# Patient Record
Sex: FEMALE | Race: WHITE | NOT HISPANIC OR LATINO | Employment: OTHER | URBAN - METROPOLITAN AREA
[De-identification: names, ages, dates, MRNs, and addresses within clinical notes are randomized per-mention and may not be internally consistent; named-entity substitution may affect disease eponyms.]

---

## 2017-04-25 ENCOUNTER — TRANSCRIBE ORDERS (OUTPATIENT)
Dept: ADMINISTRATIVE | Facility: HOSPITAL | Age: 77
End: 2017-04-25

## 2017-04-25 DIAGNOSIS — Z13.820 SCREENING FOR OSTEOPOROSIS: Primary | ICD-10-CM

## 2017-05-18 ENCOUNTER — HOSPITAL ENCOUNTER (OUTPATIENT)
Dept: RADIOLOGY | Facility: MEDICAL CENTER | Age: 77
Discharge: HOME | End: 2017-05-18
Payer: MEDICARE

## 2017-05-18 DIAGNOSIS — Z13.820 SCREENING FOR OSTEOPOROSIS: ICD-10-CM

## 2017-05-18 PROCEDURE — 77080 DXA BONE DENSITY AXIAL: CPT

## 2017-11-15 ENCOUNTER — APPOINTMENT (OUTPATIENT)
Dept: LAB | Facility: MEDICAL CENTER | Age: 77
End: 2017-11-15
Payer: MEDICARE

## 2017-11-15 ENCOUNTER — TRANSCRIBE ORDERS (OUTPATIENT)
Dept: LAB | Facility: MEDICAL CENTER | Age: 77
End: 2017-11-15

## 2017-11-15 DIAGNOSIS — I10 ESSENTIAL HYPERTENSION, MALIGNANT: Primary | ICD-10-CM

## 2017-11-15 DIAGNOSIS — M85.9 DISORDER OF BONE DENSITY AND STRUCTURE, UNSPECIFIED: ICD-10-CM

## 2017-11-15 DIAGNOSIS — I10 ESSENTIAL HYPERTENSION, MALIGNANT: ICD-10-CM

## 2017-11-15 LAB
ALBUMIN SERPL BCP-MCNC: 3.3 G/DL (ref 3.5–5)
ALP SERPL-CCNC: 173 U/L (ref 46–116)
ALT SERPL W P-5'-P-CCNC: 21 U/L (ref 12–78)
ANION GAP SERPL CALCULATED.3IONS-SCNC: 7 MMOL/L (ref 4–13)
AST SERPL W P-5'-P-CCNC: 30 U/L (ref 5–45)
BASOPHILS # BLD AUTO: 0.04 THOUSANDS/ΜL (ref 0–0.1)
BASOPHILS NFR BLD AUTO: 1 % (ref 0–1)
BILIRUB SERPL-MCNC: 0.3 MG/DL (ref 0.2–1)
BUN SERPL-MCNC: 7 MG/DL (ref 5–25)
CALCIUM SERPL-MCNC: 8.9 MG/DL (ref 8.3–10.1)
CHLORIDE SERPL-SCNC: 99 MMOL/L (ref 100–108)
CHOLEST SERPL-MCNC: 150 MG/DL (ref 50–200)
CO2 SERPL-SCNC: 29 MMOL/L (ref 21–32)
CREAT SERPL-MCNC: 0.59 MG/DL (ref 0.6–1.3)
EOSINOPHIL # BLD AUTO: 0.14 THOUSAND/ΜL (ref 0–0.61)
EOSINOPHIL NFR BLD AUTO: 3 % (ref 0–6)
ERYTHROCYTE [DISTWIDTH] IN BLOOD BY AUTOMATED COUNT: 12.7 % (ref 11.6–15.1)
GFR SERPL CREATININE-BSD FRML MDRD: 87 ML/MIN/1.73SQ M
GLUCOSE P FAST SERPL-MCNC: 70 MG/DL (ref 65–99)
HCT VFR BLD AUTO: 39 % (ref 34.8–46.1)
HDLC SERPL-MCNC: 79 MG/DL (ref 40–60)
HGB BLD-MCNC: 13.7 G/DL (ref 11.5–15.4)
LDLC SERPL CALC-MCNC: 48 MG/DL (ref 0–100)
LYMPHOCYTES # BLD AUTO: 1.35 THOUSANDS/ΜL (ref 0.6–4.47)
LYMPHOCYTES NFR BLD AUTO: 25 % (ref 14–44)
MCH RBC QN AUTO: 34.2 PG (ref 26.8–34.3)
MCHC RBC AUTO-ENTMCNC: 35.1 G/DL (ref 31.4–37.4)
MCV RBC AUTO: 97 FL (ref 82–98)
MONOCYTES # BLD AUTO: 0.69 THOUSAND/ΜL (ref 0.17–1.22)
MONOCYTES NFR BLD AUTO: 13 % (ref 4–12)
NEUTROPHILS # BLD AUTO: 3.28 THOUSANDS/ΜL (ref 1.85–7.62)
NEUTS SEG NFR BLD AUTO: 58 % (ref 43–75)
NRBC BLD AUTO-RTO: 0 /100 WBCS
PLATELET # BLD AUTO: 258 THOUSANDS/UL (ref 149–390)
PMV BLD AUTO: 10 FL (ref 8.9–12.7)
POTASSIUM SERPL-SCNC: 3.6 MMOL/L (ref 3.5–5.3)
PROT SERPL-MCNC: 6.9 G/DL (ref 6.4–8.2)
RBC # BLD AUTO: 4.01 MILLION/UL (ref 3.81–5.12)
SODIUM SERPL-SCNC: 135 MMOL/L (ref 136–145)
TRIGL SERPL-MCNC: 114 MG/DL
WBC # BLD AUTO: 5.52 THOUSAND/UL (ref 4.31–10.16)

## 2017-11-15 PROCEDURE — 82652 VIT D 1 25-DIHYDROXY: CPT

## 2017-11-15 PROCEDURE — 85025 COMPLETE CBC W/AUTO DIFF WBC: CPT

## 2017-11-15 PROCEDURE — 80053 COMPREHEN METABOLIC PANEL: CPT

## 2017-11-15 PROCEDURE — 36415 COLL VENOUS BLD VENIPUNCTURE: CPT

## 2017-11-15 PROCEDURE — 80061 LIPID PANEL: CPT

## 2017-11-16 LAB — 1,25(OH)2D3 SERPL-MCNC: 69.7 PG/ML (ref 19.9–79.3)

## 2018-06-14 ENCOUNTER — APPOINTMENT (OUTPATIENT)
Dept: LAB | Facility: MEDICAL CENTER | Age: 78
End: 2018-06-14
Payer: MEDICARE

## 2018-06-14 ENCOUNTER — TRANSCRIBE ORDERS (OUTPATIENT)
Dept: ADMINISTRATIVE | Facility: HOSPITAL | Age: 78
End: 2018-06-14

## 2018-06-14 DIAGNOSIS — R41.3 MEMORY LOSS: Primary | ICD-10-CM

## 2018-06-14 PROCEDURE — 36415 COLL VENOUS BLD VENIPUNCTURE: CPT | Performed by: PSYCHIATRY & NEUROLOGY

## 2018-06-14 PROCEDURE — 84443 ASSAY THYROID STIM HORMONE: CPT | Performed by: PSYCHIATRY & NEUROLOGY

## 2018-06-14 PROCEDURE — 82607 VITAMIN B-12: CPT | Performed by: PSYCHIATRY & NEUROLOGY

## 2018-06-15 LAB
TSH SERPL DL<=0.05 MIU/L-ACNC: 1.97 UIU/ML (ref 0.36–3.74)
VIT B12 SERPL-MCNC: 410 PG/ML (ref 100–900)

## 2019-07-15 ENCOUNTER — APPOINTMENT (OUTPATIENT)
Dept: LAB | Facility: MEDICAL CENTER | Age: 79
End: 2019-07-15
Payer: MEDICARE

## 2019-07-15 ENCOUNTER — TRANSCRIBE ORDERS (OUTPATIENT)
Dept: LAB | Facility: MEDICAL CENTER | Age: 79
End: 2019-07-15

## 2019-07-15 DIAGNOSIS — I10 ESSENTIAL HYPERTENSION, BENIGN: Primary | ICD-10-CM

## 2019-07-15 DIAGNOSIS — G40.109 SPECIAL SENSORY ATTACKS (HCC): ICD-10-CM

## 2019-07-15 DIAGNOSIS — E78.00 PURE HYPERCHOLESTEROLEMIA: ICD-10-CM

## 2019-07-15 DIAGNOSIS — M85.9 DISORDER OF BONE DENSITY AND STRUCTURE, UNSPECIFIED: ICD-10-CM

## 2019-07-15 DIAGNOSIS — K21.00 REFLUX ESOPHAGITIS: ICD-10-CM

## 2019-07-15 DIAGNOSIS — I10 ESSENTIAL HYPERTENSION, BENIGN: ICD-10-CM

## 2019-07-15 LAB
25(OH)D3 SERPL-MCNC: 30 NG/ML (ref 30–100)
ALBUMIN SERPL BCP-MCNC: 3.4 G/DL (ref 3.5–5)
ALP SERPL-CCNC: 99 U/L (ref 46–116)
ALT SERPL W P-5'-P-CCNC: 17 U/L (ref 12–78)
ANION GAP SERPL CALCULATED.3IONS-SCNC: 5 MMOL/L (ref 4–13)
AST SERPL W P-5'-P-CCNC: 21 U/L (ref 5–45)
BASOPHILS # BLD AUTO: 0.05 THOUSANDS/ΜL (ref 0–0.1)
BASOPHILS NFR BLD AUTO: 1 % (ref 0–1)
BILIRUB SERPL-MCNC: 0.61 MG/DL (ref 0.2–1)
BUN SERPL-MCNC: 14 MG/DL (ref 5–25)
CALCIUM SERPL-MCNC: 8.6 MG/DL (ref 8.3–10.1)
CHLORIDE SERPL-SCNC: 105 MMOL/L (ref 100–108)
CHOLEST SERPL-MCNC: 172 MG/DL (ref 50–200)
CO2 SERPL-SCNC: 27 MMOL/L (ref 21–32)
CREAT SERPL-MCNC: 0.68 MG/DL (ref 0.6–1.3)
EOSINOPHIL # BLD AUTO: 0.13 THOUSAND/ΜL (ref 0–0.61)
EOSINOPHIL NFR BLD AUTO: 2 % (ref 0–6)
ERYTHROCYTE [DISTWIDTH] IN BLOOD BY AUTOMATED COUNT: 14.2 % (ref 11.6–15.1)
GFR SERPL CREATININE-BSD FRML MDRD: 82 ML/MIN/1.73SQ M
GLUCOSE P FAST SERPL-MCNC: 83 MG/DL (ref 65–99)
HCT VFR BLD AUTO: 42.1 % (ref 34.8–46.1)
HDLC SERPL-MCNC: 54 MG/DL (ref 40–60)
HGB BLD-MCNC: 13.9 G/DL (ref 11.5–15.4)
IMM GRANULOCYTES # BLD AUTO: 0.02 THOUSAND/UL (ref 0–0.2)
IMM GRANULOCYTES NFR BLD AUTO: 0 % (ref 0–2)
LDLC SERPL CALC-MCNC: 85 MG/DL (ref 0–100)
LYMPHOCYTES # BLD AUTO: 1.27 THOUSANDS/ΜL (ref 0.6–4.47)
LYMPHOCYTES NFR BLD AUTO: 21 % (ref 14–44)
MCH RBC QN AUTO: 32.2 PG (ref 26.8–34.3)
MCHC RBC AUTO-ENTMCNC: 33 G/DL (ref 31.4–37.4)
MCV RBC AUTO: 98 FL (ref 82–98)
MONOCYTES # BLD AUTO: 0.73 THOUSAND/ΜL (ref 0.17–1.22)
MONOCYTES NFR BLD AUTO: 12 % (ref 4–12)
NEUTROPHILS # BLD AUTO: 4.01 THOUSANDS/ΜL (ref 1.85–7.62)
NEUTS SEG NFR BLD AUTO: 64 % (ref 43–75)
NONHDLC SERPL-MCNC: 118 MG/DL
NRBC BLD AUTO-RTO: 0 /100 WBCS
PLATELET # BLD AUTO: 267 THOUSANDS/UL (ref 149–390)
PMV BLD AUTO: 10.8 FL (ref 8.9–12.7)
POTASSIUM SERPL-SCNC: 3.6 MMOL/L (ref 3.5–5.3)
PROT SERPL-MCNC: 7.1 G/DL (ref 6.4–8.2)
RBC # BLD AUTO: 4.32 MILLION/UL (ref 3.81–5.12)
SODIUM SERPL-SCNC: 137 MMOL/L (ref 136–145)
TRIGL SERPL-MCNC: 167 MG/DL
TSH SERPL DL<=0.05 MIU/L-ACNC: 2.47 UIU/ML (ref 0.36–3.74)
WBC # BLD AUTO: 6.21 THOUSAND/UL (ref 4.31–10.16)

## 2019-07-15 PROCEDURE — 84443 ASSAY THYROID STIM HORMONE: CPT

## 2019-07-15 PROCEDURE — 85025 COMPLETE CBC W/AUTO DIFF WBC: CPT

## 2019-07-15 PROCEDURE — 82306 VITAMIN D 25 HYDROXY: CPT

## 2019-07-15 PROCEDURE — 80053 COMPREHEN METABOLIC PANEL: CPT

## 2019-07-15 PROCEDURE — 80061 LIPID PANEL: CPT

## 2019-07-15 PROCEDURE — 36415 COLL VENOUS BLD VENIPUNCTURE: CPT

## 2020-05-30 ENCOUNTER — APPOINTMENT (EMERGENCY)
Dept: RADIOLOGY | Facility: HOSPITAL | Age: 80
End: 2020-05-30
Payer: MEDICARE

## 2020-05-30 ENCOUNTER — HOSPITAL ENCOUNTER (EMERGENCY)
Facility: HOSPITAL | Age: 80
Discharge: HOME | End: 2020-05-30
Attending: EMERGENCY MEDICINE
Payer: MEDICARE

## 2020-05-30 VITALS
TEMPERATURE: 98.4 F | HEART RATE: 86 BPM | OXYGEN SATURATION: 96 % | BODY MASS INDEX: 18.5 KG/M2 | WEIGHT: 114.64 LBS | DIASTOLIC BLOOD PRESSURE: 90 MMHG | SYSTOLIC BLOOD PRESSURE: 167 MMHG | RESPIRATION RATE: 18 BRPM

## 2020-05-30 DIAGNOSIS — M54.41 ACUTE RIGHT-SIDED LOW BACK PAIN WITH RIGHT-SIDED SCIATICA: ICD-10-CM

## 2020-05-30 DIAGNOSIS — M54.50 ACUTE MIDLINE LOW BACK PAIN WITHOUT SCIATICA: Primary | ICD-10-CM

## 2020-05-30 DIAGNOSIS — R50.9 FEVER: ICD-10-CM

## 2020-05-30 LAB
BACTERIA UR QL AUTO: ABNORMAL /HPF
BILIRUB UR QL STRIP: ABNORMAL
CLARITY UR: CLEAR
COLOR UR: YELLOW
GLUCOSE UR STRIP-MCNC: NEGATIVE MG/DL
HGB UR QL STRIP.AUTO: ABNORMAL
KETONES UR STRIP-MCNC: ABNORMAL MG/DL
LEUKOCYTE ESTERASE UR QL STRIP: ABNORMAL
MUCOUS THREADS UR QL AUTO: ABNORMAL
NITRITE UR QL STRIP: NEGATIVE
NON-SQ EPI CELLS URNS QL MICRO: ABNORMAL /HPF
PH UR STRIP.AUTO: 6 [PH]
PROT UR STRIP-MCNC: NEGATIVE MG/DL
RBC #/AREA URNS AUTO: ABNORMAL /HPF
SARS-COV-2 RNA RESP QL NAA+PROBE: NEGATIVE
SP GR UR STRIP.AUTO: 1.02 (ref 1–1.03)
UROBILINOGEN UR QL STRIP.AUTO: 1 E.U./DL
WBC #/AREA URNS AUTO: ABNORMAL /HPF

## 2020-05-30 PROCEDURE — 72170 X-RAY EXAM OF PELVIS: CPT

## 2020-05-30 PROCEDURE — 81001 URINALYSIS AUTO W/SCOPE: CPT | Performed by: EMERGENCY MEDICINE

## 2020-05-30 PROCEDURE — 71045 X-RAY EXAM CHEST 1 VIEW: CPT

## 2020-05-30 PROCEDURE — 87635 SARS-COV-2 COVID-19 AMP PRB: CPT | Performed by: EMERGENCY MEDICINE

## 2020-05-30 PROCEDURE — 72100 X-RAY EXAM L-S SPINE 2/3 VWS: CPT

## 2020-05-30 PROCEDURE — 99284 EMERGENCY DEPT VISIT MOD MDM: CPT | Performed by: EMERGENCY MEDICINE

## 2020-05-30 PROCEDURE — 99284 EMERGENCY DEPT VISIT MOD MDM: CPT

## 2020-05-30 PROCEDURE — 72220 X-RAY EXAM SACRUM TAILBONE: CPT

## 2020-05-30 PROCEDURE — 87086 URINE CULTURE/COLONY COUNT: CPT | Performed by: EMERGENCY MEDICINE

## 2020-05-30 RX ORDER — LIDOCAINE 50 MG/G
1 PATCH TOPICAL ONCE
Status: DISCONTINUED | OUTPATIENT
Start: 2020-05-30 | End: 2020-05-30 | Stop reason: HOSPADM

## 2020-05-30 RX ORDER — OMEPRAZOLE 10 MG/1
10 CAPSULE, DELAYED RELEASE ORAL DAILY
COMMUNITY

## 2020-05-30 RX ORDER — DONEPEZIL HYDROCHLORIDE 10 MG/1
10 TABLET, FILM COATED ORAL
Status: ON HOLD | COMMUNITY
End: 2021-01-29 | Stop reason: SDUPTHER

## 2020-05-30 RX ORDER — MULTIVITAMIN
1 TABLET ORAL DAILY
COMMUNITY

## 2020-05-30 RX ORDER — ACETAMINOPHEN 325 MG/1
650 TABLET ORAL ONCE
Status: COMPLETED | OUTPATIENT
Start: 2020-05-30 | End: 2020-05-30

## 2020-05-30 RX ADMIN — LIDOCAINE 1 PATCH: 50 PATCH TOPICAL at 09:49

## 2020-05-30 RX ADMIN — ACETAMINOPHEN 650 MG: 325 TABLET, FILM COATED ORAL at 09:48

## 2020-05-31 LAB — BACTERIA UR CULT: NORMAL

## 2020-07-15 ENCOUNTER — TRANSCRIBE ORDERS (OUTPATIENT)
Dept: ADMINISTRATIVE | Facility: HOSPITAL | Age: 80
End: 2020-07-15

## 2020-07-15 DIAGNOSIS — M89.9 BONE DISORDER: Primary | ICD-10-CM

## 2020-08-03 ENCOUNTER — TRANSCRIBE ORDERS (OUTPATIENT)
Dept: ADMINISTRATIVE | Facility: HOSPITAL | Age: 80
End: 2020-08-03

## 2020-08-03 ENCOUNTER — APPOINTMENT (OUTPATIENT)
Dept: LAB | Facility: MEDICAL CENTER | Age: 80
End: 2020-08-03
Payer: MEDICARE

## 2020-08-03 DIAGNOSIS — M85.9 DISORDER OF BONE DENSITY AND STRUCTURE, UNSPECIFIED: ICD-10-CM

## 2020-08-03 DIAGNOSIS — I10 ESSENTIAL HYPERTENSION, BENIGN: Primary | ICD-10-CM

## 2020-08-03 DIAGNOSIS — R56.9 GENERALIZED-ONSET SEIZURES (HCC): ICD-10-CM

## 2020-08-03 LAB
25(OH)D3 SERPL-MCNC: 40 NG/ML (ref 30–100)
ALBUMIN SERPL BCP-MCNC: 3.4 G/DL (ref 3.5–5)
ALP SERPL-CCNC: 87 U/L (ref 46–116)
ALT SERPL W P-5'-P-CCNC: 17 U/L (ref 12–78)
ANION GAP SERPL CALCULATED.3IONS-SCNC: 6 MMOL/L (ref 4–13)
AST SERPL W P-5'-P-CCNC: 19 U/L (ref 5–45)
BASOPHILS # BLD AUTO: 0.07 THOUSANDS/ΜL (ref 0–0.1)
BASOPHILS NFR BLD AUTO: 1 % (ref 0–1)
BILIRUB SERPL-MCNC: 0.46 MG/DL (ref 0.2–1)
BUN SERPL-MCNC: 9 MG/DL (ref 5–25)
CALCIUM SERPL-MCNC: 9.1 MG/DL (ref 8.3–10.1)
CHLORIDE SERPL-SCNC: 104 MMOL/L (ref 100–108)
CHOLEST SERPL-MCNC: 210 MG/DL (ref 50–200)
CO2 SERPL-SCNC: 28 MMOL/L (ref 21–32)
CREAT SERPL-MCNC: 0.76 MG/DL (ref 0.6–1.3)
EOSINOPHIL # BLD AUTO: 0.18 THOUSAND/ΜL (ref 0–0.61)
EOSINOPHIL NFR BLD AUTO: 3 % (ref 0–6)
ERYTHROCYTE [DISTWIDTH] IN BLOOD BY AUTOMATED COUNT: 14.6 % (ref 11.6–15.1)
GFR SERPL CREATININE-BSD FRML MDRD: 73 ML/MIN/1.73SQ M
GLUCOSE P FAST SERPL-MCNC: 86 MG/DL (ref 65–99)
HCT VFR BLD AUTO: 39 % (ref 34.8–46.1)
HDLC SERPL-MCNC: 57 MG/DL
HGB BLD-MCNC: 12.6 G/DL (ref 11.5–15.4)
IMM GRANULOCYTES # BLD AUTO: 0.02 THOUSAND/UL (ref 0–0.2)
IMM GRANULOCYTES NFR BLD AUTO: 0 % (ref 0–2)
LDLC SERPL CALC-MCNC: 129 MG/DL (ref 0–100)
LDLC SERPL DIRECT ASSAY-MCNC: 122 MG/DL (ref 0–100)
LYMPHOCYTES # BLD AUTO: 1.68 THOUSANDS/ΜL (ref 0.6–4.47)
LYMPHOCYTES NFR BLD AUTO: 28 % (ref 14–44)
MCH RBC QN AUTO: 30.3 PG (ref 26.8–34.3)
MCHC RBC AUTO-ENTMCNC: 32.3 G/DL (ref 31.4–37.4)
MCV RBC AUTO: 94 FL (ref 82–98)
MONOCYTES # BLD AUTO: 0.7 THOUSAND/ΜL (ref 0.17–1.22)
MONOCYTES NFR BLD AUTO: 12 % (ref 4–12)
NEUTROPHILS # BLD AUTO: 3.37 THOUSANDS/ΜL (ref 1.85–7.62)
NEUTS SEG NFR BLD AUTO: 56 % (ref 43–75)
NONHDLC SERPL-MCNC: 153 MG/DL
NRBC BLD AUTO-RTO: 0 /100 WBCS
PLATELET # BLD AUTO: 382 THOUSANDS/UL (ref 149–390)
PMV BLD AUTO: 9.9 FL (ref 8.9–12.7)
POTASSIUM SERPL-SCNC: 4.5 MMOL/L (ref 3.5–5.3)
PROT SERPL-MCNC: 7.5 G/DL (ref 6.4–8.2)
RBC # BLD AUTO: 4.16 MILLION/UL (ref 3.81–5.12)
SODIUM SERPL-SCNC: 138 MMOL/L (ref 136–145)
TRIGL SERPL-MCNC: 122 MG/DL
TSH SERPL DL<=0.05 MIU/L-ACNC: 2.72 UIU/ML (ref 0.36–3.74)
WBC # BLD AUTO: 6.02 THOUSAND/UL (ref 4.31–10.16)

## 2020-08-03 PROCEDURE — 82306 VITAMIN D 25 HYDROXY: CPT | Performed by: FAMILY MEDICINE

## 2020-08-03 PROCEDURE — 84443 ASSAY THYROID STIM HORMONE: CPT | Performed by: FAMILY MEDICINE

## 2020-08-03 PROCEDURE — 85025 COMPLETE CBC W/AUTO DIFF WBC: CPT | Performed by: FAMILY MEDICINE

## 2020-08-03 PROCEDURE — 80061 LIPID PANEL: CPT | Performed by: FAMILY MEDICINE

## 2020-08-03 PROCEDURE — 80053 COMPREHEN METABOLIC PANEL: CPT | Performed by: FAMILY MEDICINE

## 2020-08-03 PROCEDURE — 36415 COLL VENOUS BLD VENIPUNCTURE: CPT | Performed by: FAMILY MEDICINE

## 2020-08-03 PROCEDURE — 83721 ASSAY OF BLOOD LIPOPROTEIN: CPT | Performed by: FAMILY MEDICINE

## 2020-08-04 ENCOUNTER — HOSPITAL ENCOUNTER (EMERGENCY)
Facility: HOSPITAL | Age: 80
Discharge: HOME | End: 2020-08-04
Attending: EMERGENCY MEDICINE
Payer: MEDICARE

## 2020-08-04 VITALS
TEMPERATURE: 98.5 F | OXYGEN SATURATION: 96 % | RESPIRATION RATE: 16 BRPM | DIASTOLIC BLOOD PRESSURE: 84 MMHG | BODY MASS INDEX: 19.75 KG/M2 | SYSTOLIC BLOOD PRESSURE: 156 MMHG | HEART RATE: 95 BPM | WEIGHT: 122.36 LBS

## 2020-08-04 DIAGNOSIS — R19.7 DIARRHEA: Primary | ICD-10-CM

## 2020-08-04 DIAGNOSIS — R10.9 ABDOMINAL PAIN: ICD-10-CM

## 2020-08-04 LAB
ANION GAP SERPL CALCULATED.3IONS-SCNC: 10 MMOL/L (ref 4–13)
APTT PPP: 29 SECONDS (ref 23–37)
BASOPHILS # BLD AUTO: 0.05 THOUSANDS/ΜL (ref 0–0.1)
BASOPHILS NFR BLD AUTO: 1 % (ref 0–1)
BUN SERPL-MCNC: 10 MG/DL (ref 5–25)
CALCIUM SERPL-MCNC: 8.5 MG/DL (ref 8.3–10.1)
CHLORIDE SERPL-SCNC: 102 MMOL/L (ref 100–108)
CO2 SERPL-SCNC: 26 MMOL/L (ref 21–32)
CREAT SERPL-MCNC: 0.86 MG/DL (ref 0.6–1.3)
EOSINOPHIL # BLD AUTO: 0.09 THOUSAND/ΜL (ref 0–0.61)
EOSINOPHIL NFR BLD AUTO: 1 % (ref 0–6)
ERYTHROCYTE [DISTWIDTH] IN BLOOD BY AUTOMATED COUNT: 14.6 % (ref 11.6–15.1)
GFR SERPL CREATININE-BSD FRML MDRD: 63 ML/MIN/1.73SQ M
GLUCOSE SERPL-MCNC: 85 MG/DL (ref 65–140)
HCT VFR BLD AUTO: 35.2 % (ref 34.8–46.1)
HGB BLD-MCNC: 11.6 G/DL (ref 11.5–15.4)
IMM GRANULOCYTES # BLD AUTO: 0.02 THOUSAND/UL (ref 0–0.2)
IMM GRANULOCYTES NFR BLD AUTO: 0 % (ref 0–2)
INR PPP: 1.04 (ref 0.84–1.19)
LYMPHOCYTES # BLD AUTO: 1.26 THOUSANDS/ΜL (ref 0.6–4.47)
LYMPHOCYTES NFR BLD AUTO: 20 % (ref 14–44)
MCH RBC QN AUTO: 30.4 PG (ref 26.8–34.3)
MCHC RBC AUTO-ENTMCNC: 33 G/DL (ref 31.4–37.4)
MCV RBC AUTO: 92 FL (ref 82–98)
MONOCYTES # BLD AUTO: 0.57 THOUSAND/ΜL (ref 0.17–1.22)
MONOCYTES NFR BLD AUTO: 9 % (ref 4–12)
NEUTROPHILS # BLD AUTO: 4.38 THOUSANDS/ΜL (ref 1.85–7.62)
NEUTS SEG NFR BLD AUTO: 69 % (ref 43–75)
NRBC BLD AUTO-RTO: 0 /100 WBCS
PLATELET # BLD AUTO: 320 THOUSANDS/UL (ref 149–390)
PMV BLD AUTO: 8.8 FL (ref 8.9–12.7)
POTASSIUM SERPL-SCNC: 3.9 MMOL/L (ref 3.5–5.3)
PROTHROMBIN TIME: 13.7 SECONDS (ref 11.6–14.5)
RBC # BLD AUTO: 3.82 MILLION/UL (ref 3.81–5.12)
SODIUM SERPL-SCNC: 138 MMOL/L (ref 136–145)
WBC # BLD AUTO: 6.37 THOUSAND/UL (ref 4.31–10.16)

## 2020-08-04 PROCEDURE — 99284 EMERGENCY DEPT VISIT MOD MDM: CPT

## 2020-08-04 PROCEDURE — 96360 HYDRATION IV INFUSION INIT: CPT

## 2020-08-04 PROCEDURE — 99284 EMERGENCY DEPT VISIT MOD MDM: CPT | Performed by: EMERGENCY MEDICINE

## 2020-08-04 PROCEDURE — 85610 PROTHROMBIN TIME: CPT | Performed by: EMERGENCY MEDICINE

## 2020-08-04 PROCEDURE — 85025 COMPLETE CBC W/AUTO DIFF WBC: CPT | Performed by: EMERGENCY MEDICINE

## 2020-08-04 PROCEDURE — 80048 BASIC METABOLIC PNL TOTAL CA: CPT | Performed by: EMERGENCY MEDICINE

## 2020-08-04 PROCEDURE — 85730 THROMBOPLASTIN TIME PARTIAL: CPT | Performed by: EMERGENCY MEDICINE

## 2020-08-04 PROCEDURE — 36415 COLL VENOUS BLD VENIPUNCTURE: CPT | Performed by: EMERGENCY MEDICINE

## 2020-08-04 RX ADMIN — SODIUM CHLORIDE 1000 ML: 0.9 INJECTION, SOLUTION INTRAVENOUS at 12:07

## 2020-08-04 NOTE — ED PROVIDER NOTES
History  Chief Complaint   Patient presents with    Diarrhea     Pt c/o 6 episodes of diarrhea today  Hx of IBS so not abnormal for pt  States BMs had a pink tint to them  Patient presents to emergency department for evaluation of multiple episodes of diarrhea that began today  Patient does have irritable bowel syndrome and it is not unusual for her to have diarrheal episodes  She denies nausea or vomiting and denies current abdominal pain  She denies chest pain shortness of breath  Patient denies fever chills rash  She denies trauma fall or injury  She denies urinary symptoms or vaginal discharge  No ill contacts or foreign travel or unusual food ingestions  Prior to Admission Medications   Prescriptions Last Dose Informant Patient Reported? Taking? Multiple Vitamin (MULTIVITAMIN) tablet   Yes Yes   Sig: Take 1 tablet by mouth daily   dilTIAZem HCl (CARDIZEM PO)   Yes Yes   Sig: Take by mouth   donepezil (ARICEPT) 10 mg tablet   Yes Yes   Sig: Take 10 mg by mouth daily at bedtime   omeprazole (PriLOSEC) 10 mg delayed release capsule   Yes Yes   Sig: Take 10 mg by mouth daily      Facility-Administered Medications: None       Past Medical History:   Diagnosis Date    Hypertension     IBS (irritable bowel syndrome)        History reviewed  No pertinent surgical history  History reviewed  No pertinent family history  I have reviewed and agree with the history as documented  E-Cigarette/Vaping     E-Cigarette/Vaping Substances     Social History     Tobacco Use    Smoking status: Former Smoker   Substance Use Topics    Alcohol use: Yes     Alcohol/week: 21 0 standard drinks     Types: 21 Cans of beer per week     Comment: states 3 beers/night    Drug use: No       Review of Systems   Constitutional: Negative  Negative for activity change, appetite change, chills, diaphoresis, fatigue and fever  HENT: Negative    Negative for congestion, rhinorrhea, sinus pressure, sinus pain, sore throat and voice change  Eyes: Negative  Negative for photophobia and visual disturbance  Respiratory: Negative  Negative for cough, chest tightness, shortness of breath, wheezing and stridor  Cardiovascular: Negative  Negative for chest pain, palpitations and leg swelling  Gastrointestinal: Positive for abdominal pain and diarrhea  Negative for abdominal distention, anal bleeding, blood in stool, constipation, nausea, rectal pain and vomiting  Endocrine: Negative  Genitourinary: Negative  Negative for difficulty urinating, dysuria, flank pain, frequency, hematuria, pelvic pain, vaginal bleeding, vaginal discharge and vaginal pain  Musculoskeletal: Negative  Negative for back pain, neck pain and neck stiffness  Skin: Negative  Negative for rash and wound  Allergic/Immunologic: Negative  Neurological: Negative  Negative for dizziness, tremors, seizures, syncope, facial asymmetry, speech difficulty, weakness, light-headedness, numbness and headaches  Hematological: Negative  Does not bruise/bleed easily  Psychiatric/Behavioral: Negative  Negative for confusion  Physical Exam  Physical Exam  Vitals signs and nursing note reviewed  Constitutional:       General: She is not in acute distress  Appearance: Normal appearance  She is well-developed and normal weight  She is not ill-appearing, toxic-appearing or diaphoretic  Comments: Nontoxic appearing  No respiratory distress  Patient is comfortable to structure few able cover station questions appropriately   HENT:      Head: Normocephalic and atraumatic  Right Ear: External ear normal       Left Ear: External ear normal       Nose: Nose normal  No congestion or rhinorrhea  Eyes:      Conjunctiva/sclera: Conjunctivae normal       Pupils: Pupils are equal, round, and reactive to light  Neck:      Musculoskeletal: Normal range of motion and neck supple  No neck rigidity or muscular tenderness        Vascular: No carotid bruit  Cardiovascular:      Rate and Rhythm: Normal rate and regular rhythm  Heart sounds: Normal heart sounds  Pulmonary:      Effort: Pulmonary effort is normal  No respiratory distress  Breath sounds: Normal breath sounds  No stridor  No wheezing, rhonchi or rales  Chest:      Chest wall: No tenderness  Abdominal:      General: Abdomen is flat  Bowel sounds are normal  There is no distension  Palpations: Abdomen is soft  There is no mass  Tenderness: There is no abdominal tenderness  There is no right CVA tenderness, left CVA tenderness, guarding or rebound  Hernia: No hernia is present  Comments: No reproducible abdominal tenderness to palpation  No masses hernias were peritoneal signs  Musculoskeletal: Normal range of motion  General: No swelling, tenderness, deformity or signs of injury  Right lower leg: No edema  Left lower leg: No edema  Lymphadenopathy:      Cervical: No cervical adenopathy  Skin:     General: Skin is warm and dry  Capillary Refill: Capillary refill takes less than 2 seconds  Coloration: Skin is not jaundiced  Findings: No bruising, lesion or rash  Neurological:      General: No focal deficit present  Mental Status: She is alert and oriented to person, place, and time  Mental status is at baseline  Cranial Nerves: No cranial nerve deficit  Sensory: No sensory deficit  Motor: No weakness  Coordination: Coordination normal       Gait: Gait normal       Deep Tendon Reflexes: Reflexes are normal and symmetric  Reflexes normal    Psychiatric:         Mood and Affect: Mood normal          Behavior: Behavior normal          Thought Content:  Thought content normal          Judgment: Judgment normal          Vital Signs  ED Triage Vitals [08/04/20 1135]   Temperature Pulse Respirations Blood Pressure SpO2   98 5 °F (36 9 °C) 95 16 156/84 96 %      Temp Source Heart Rate Source Patient Position - Orthostatic VS BP Location FiO2 (%)   Oral Monitor Sitting Right arm --      Pain Score       --           Vitals:    08/04/20 1135   BP: 156/84   Pulse: 95   Patient Position - Orthostatic VS: Sitting         Visual Acuity      ED Medications  Medications   sodium chloride 0 9 % bolus 1,000 mL (0 mL Intravenous Stopped 8/4/20 1314)       Diagnostic Studies  Results Reviewed     Procedure Component Value Units Date/Time    Basic metabolic panel [[id number]] Collected:  08/04/20 1209    Lab Status:  Final result Specimen:  Blood from Arm, Right Updated:  08/04/20 1228     Sodium 138 mmol/L      Potassium 3 9 mmol/L      Chloride 102 mmol/L      CO2 26 mmol/L      ANION GAP 10 mmol/L      BUN 10 mg/dL      Creatinine 0 86 mg/dL      Glucose 85 mg/dL      Calcium 8 5 mg/dL      eGFR 63 ml/min/1 73sq m     Narrative:       [place] guidelines for Chronic Kidney Disease (CKD):     Stage 1 with normal or high GFR (GFR > 90 mL/min/1 73 square meters)    Stage 2 Mild CKD (GFR = 60-89 mL/min/1 73 square meters)    Stage 3A Moderate CKD (GFR = 45-59 mL/min/1 73 square meters)    Stage 3B Moderate CKD (GFR = 30-44 mL/min/1 73 square meters)    Stage 4 Severe CKD (GFR = 15-29 mL/min/1 73 square meters)    Stage 5 End Stage CKD (GFR <15 mL/min/1 73 square meters)  Note: GFR calculation is accurate only with a steady state creatinine    Protime-INR [[id number]]  (Normal) Collected:  08/04/20 1209    Lab Status:  Final result Specimen:  Blood from Arm, Right Updated:  08/04/20 1227     Protime 13 7 seconds      INR 1 04    APTT [[id number]]  (Normal) Collected:  08/04/20 1209    Lab Status:  Final result Specimen:  Blood from Arm, Right Updated:  08/04/20 1227     PTT 29 seconds     CBC and differential [[id number]]  (Abnormal) Collected:  08/04/20 1209    Lab Status:  Final result Specimen:  Blood from Arm, Right Updated:  08/04/20 1217     WBC 6 37 Thousand/uL      RBC 3 82 Million/uL Hemoglobin 11 6 g/dL      Hematocrit 35 2 %      MCV 92 fL      MCH 30 4 pg      MCHC 33 0 g/dL      RDW 14 6 %      MPV 8 8 fL      Platelets 457 Thousands/uL      nRBC 0 /100 WBCs      Neutrophils Relative 69 %      Immat GRANS % 0 %      Lymphocytes Relative 20 %      Monocytes Relative 9 %      Eosinophils Relative 1 %      Basophils Relative 1 %      Neutrophils Absolute 4 38 Thousands/µL      Immature Grans Absolute 0 02 Thousand/uL      Lymphocytes Absolute 1 26 Thousands/µL      Monocytes Absolute 0 57 Thousand/µL      Eosinophils Absolute 0 09 Thousand/µL      Basophils Absolute 0 05 Thousands/µL                  No orders to display              Procedures  Procedures         ED Course  ED Course as of Aug 04 1337   Tue Aug 04, 2020   1305 Patient is stable for discharge  No further diarrheal episodes  Patient denies abdominal pain S no complaints at this time during discussed fluid intake and signs and symptoms requiring return to the emergency department  MDM      Disposition  Final diagnoses:   Diarrhea   Abdominal pain     Time reflects when diagnosis was documented in both MDM as applicable and the Disposition within this note     Time User Action Codes Description Comment    8/4/2020  1:06 PM Parth Olmsted Add [R19 7] Diarrhea     8/4/2020  1:06 PM Parth Olmsted Add [R10 9] Abdominal pain       ED Disposition     ED Disposition Condition Date/Time Comment    Discharge Stable Tue Aug 4, 2020  1:06 PM Violet Conception Stump discharge to home/self care  Follow-up Information     Follow up With Specialties Details Why Avis Tinsley 51, DO Family Medicine Schedule an appointment as soon as possible for a visit  As needed [id number]   [address] [place] [address]  [phone]            Discharge Medication List as of 8/4/2020  1:08 PM      CONTINUE these medications which have NOT CHANGED    Details   dilTIAZem HCl (CARDIZEM PO) Take by mouth, Historical Med      donepezil (ARICEPT) 10 mg tablet Take 10 mg by mouth daily at bedtime, Historical Med      Multiple Vitamin (MULTIVITAMIN) tablet Take 1 tablet by mouth daily, Historical Med      omeprazole (PriLOSEC) 10 mg delayed release capsule Take 10 mg by mouth daily, Historical Med           No discharge procedures on file      PDMP Review     None          ED Provider  Electronically Signed by           Stephen Kelly MD  08/04/20 3835

## 2020-08-09 ENCOUNTER — APPOINTMENT (EMERGENCY)
Dept: CT IMAGING | Facility: HOSPITAL | Age: 80
End: 2020-08-09
Payer: MEDICARE

## 2020-08-09 ENCOUNTER — HOSPITAL ENCOUNTER (EMERGENCY)
Facility: HOSPITAL | Age: 80
Discharge: HOME | End: 2020-08-09
Attending: EMERGENCY MEDICINE
Payer: MEDICARE

## 2020-08-09 VITALS
BODY MASS INDEX: 19.75 KG/M2 | HEIGHT: 66 IN | SYSTOLIC BLOOD PRESSURE: 166 MMHG | DIASTOLIC BLOOD PRESSURE: 81 MMHG | HEART RATE: 78 BPM | TEMPERATURE: 98.6 F | RESPIRATION RATE: 18 BRPM | OXYGEN SATURATION: 91 %

## 2020-08-09 DIAGNOSIS — R19.7 DIARRHEA: Primary | ICD-10-CM

## 2020-08-09 LAB
ALBUMIN SERPL BCP-MCNC: 2.9 G/DL (ref 3.5–5)
ALP SERPL-CCNC: 75 U/L (ref 46–116)
ALT SERPL W P-5'-P-CCNC: 16 U/L (ref 12–78)
ANION GAP SERPL CALCULATED.3IONS-SCNC: 6 MMOL/L (ref 4–13)
AST SERPL W P-5'-P-CCNC: 15 U/L (ref 5–45)
BASOPHILS # BLD AUTO: 0.05 THOUSANDS/ΜL (ref 0–0.1)
BASOPHILS NFR BLD AUTO: 1 % (ref 0–1)
BILIRUB SERPL-MCNC: 0.34 MG/DL (ref 0.2–1)
BUN SERPL-MCNC: 11 MG/DL (ref 5–25)
CALCIUM SERPL-MCNC: 7.8 MG/DL (ref 8.3–10.1)
CHLORIDE SERPL-SCNC: 103 MMOL/L (ref 100–108)
CO2 SERPL-SCNC: 30 MMOL/L (ref 21–32)
CREAT SERPL-MCNC: 0.76 MG/DL (ref 0.6–1.3)
EOSINOPHIL # BLD AUTO: 0.11 THOUSAND/ΜL (ref 0–0.61)
EOSINOPHIL NFR BLD AUTO: 2 % (ref 0–6)
ERYTHROCYTE [DISTWIDTH] IN BLOOD BY AUTOMATED COUNT: 15.1 % (ref 11.6–15.1)
GFR SERPL CREATININE-BSD FRML MDRD: 73 ML/MIN/1.73SQ M
GLUCOSE SERPL-MCNC: 86 MG/DL (ref 65–140)
HCT VFR BLD AUTO: 33.5 % (ref 34.8–46.1)
HGB BLD-MCNC: 10.9 G/DL (ref 11.5–15.4)
HOLD SPECIMEN: NORMAL
IMM GRANULOCYTES # BLD AUTO: 0.04 THOUSAND/UL (ref 0–0.2)
IMM GRANULOCYTES NFR BLD AUTO: 1 % (ref 0–2)
LYMPHOCYTES # BLD AUTO: 1.23 THOUSANDS/ΜL (ref 0.6–4.47)
LYMPHOCYTES NFR BLD AUTO: 17 % (ref 14–44)
MCH RBC QN AUTO: 30.4 PG (ref 26.8–34.3)
MCHC RBC AUTO-ENTMCNC: 32.5 G/DL (ref 31.4–37.4)
MCV RBC AUTO: 94 FL (ref 82–98)
MONOCYTES # BLD AUTO: 0.77 THOUSAND/ΜL (ref 0.17–1.22)
MONOCYTES NFR BLD AUTO: 10 % (ref 4–12)
NEUTROPHILS # BLD AUTO: 5.22 THOUSANDS/ΜL (ref 1.85–7.62)
NEUTS SEG NFR BLD AUTO: 69 % (ref 43–75)
NRBC BLD AUTO-RTO: 0 /100 WBCS
PLATELET # BLD AUTO: 302 THOUSANDS/UL (ref 149–390)
PMV BLD AUTO: 9.3 FL (ref 8.9–12.7)
POTASSIUM SERPL-SCNC: 3.2 MMOL/L (ref 3.5–5.3)
PROT SERPL-MCNC: 6.6 G/DL (ref 6.4–8.2)
RBC # BLD AUTO: 3.58 MILLION/UL (ref 3.81–5.12)
SODIUM SERPL-SCNC: 139 MMOL/L (ref 136–145)
WBC # BLD AUTO: 7.42 THOUSAND/UL (ref 4.31–10.16)

## 2020-08-09 PROCEDURE — 74177 CT ABD & PELVIS W/CONTRAST: CPT

## 2020-08-09 PROCEDURE — G1004 CDSM NDSC: HCPCS

## 2020-08-09 PROCEDURE — 36415 COLL VENOUS BLD VENIPUNCTURE: CPT | Performed by: PSYCHIATRY & NEUROLOGY

## 2020-08-09 PROCEDURE — 99285 EMERGENCY DEPT VISIT HI MDM: CPT

## 2020-08-09 PROCEDURE — 80053 COMPREHEN METABOLIC PANEL: CPT | Performed by: PSYCHIATRY & NEUROLOGY

## 2020-08-09 PROCEDURE — 85025 COMPLETE CBC W/AUTO DIFF WBC: CPT | Performed by: PSYCHIATRY & NEUROLOGY

## 2020-08-09 PROCEDURE — 99281 EMR DPT VST MAYX REQ PHY/QHP: CPT | Performed by: EMERGENCY MEDICINE

## 2020-08-09 PROCEDURE — 96360 HYDRATION IV INFUSION INIT: CPT

## 2020-08-09 RX ORDER — LOPERAMIDE HYDROCHLORIDE 2 MG/1
2 CAPSULE ORAL 4 TIMES DAILY PRN
Qty: 8 CAPSULE | Refills: 0 | Status: SHIPPED | OUTPATIENT
Start: 2020-08-09 | End: 2020-08-11

## 2020-08-09 RX ADMIN — SODIUM CHLORIDE 1000 ML: 0.9 INJECTION, SOLUTION INTRAVENOUS at 12:50

## 2020-08-09 RX ADMIN — IOHEXOL 100 ML: 350 INJECTION, SOLUTION INTRAVENOUS at 12:47

## 2020-08-09 NOTE — ED PROVIDER NOTES
History  Chief Complaint   Patient presents with    Black or Bloody Stool     Pt reports loose stools with bright red blood onset 6am   +abd cramping  53-year-old female with a history of hypertension and IBS presenting with blood in stool  Symptoms started at around 6 in the morning when she woke up and had to run to the bathroom  She experienced abdominal cramping, had an episode of watery diarrhea and also noticed that the color of the toilet water was read  She did not see any blood dripping down from her anus at the time  She reports that she has several loose bowel movements a day every day due to her irritable bowel syndrome  She presented to the emergency department with diarrhea on the 4th of August and at that time she also noticed some pink tinged to the toilet water when she had her bowel movements  In the interim, she did not have any blood with her stool  She reports that she has been eating, drinking and has not experienced any abdominal pain  However, today she noted that if she pressed down on her abdomen in the mid periumbilical area, it caused some discomfort  She denies nausea, vomiting, dizziness, changes in vision, changes in hearing, shortness of breath, chest pains, constipation, vaginal bleeding  She takes diltiazem for hypertension and omeprazole for intermittent GERD  Prior to Admission Medications   Prescriptions Last Dose Informant Patient Reported? Taking? Multiple Vitamin (MULTIVITAMIN) tablet   Yes No   Sig: Take 1 tablet by mouth daily   dilTIAZem HCl (CARDIZEM PO)   Yes No   Sig: Take by mouth   donepezil (ARICEPT) 10 mg tablet   Yes No   Sig: Take 10 mg by mouth daily at bedtime   omeprazole (PriLOSEC) 10 mg delayed release capsule   Yes No   Sig: Take 10 mg by mouth daily      Facility-Administered Medications: None       Past Medical History:   Diagnosis Date    Hypertension     IBS (irritable bowel syndrome)        History reviewed   No pertinent surgical history  History reviewed  No pertinent family history  I have reviewed and agree with the history as documented  E-Cigarette/Vaping    E-Cigarette Use Never User      E-Cigarette/Vaping Substances     Social History     Tobacco Use    Smoking status: Former Smoker    Smokeless tobacco: Never Used   Substance Use Topics    Alcohol use: Yes     Alcohol/week: 21 0 standard drinks     Types: 21 Cans of beer per week     Comment: states 3 beers/night    Drug use: No        Review of Systems   Constitutional: Negative for activity change, chills, diaphoresis, fatigue and unexpected weight change  HENT: Negative for ear pain, hearing loss, sinus pressure and tinnitus  Physical Exam  ED Triage Vitals   Temperature Pulse Respirations Blood Pressure SpO2   08/09/20 1049 08/09/20 1044 08/09/20 1044 08/09/20 1044 08/09/20 1044   98 6 °F (37 °C) (!) 49 14 168/82 92 %      Temp Source Heart Rate Source Patient Position - Orthostatic VS BP Location FiO2 (%)   08/09/20 1044 -- -- -- --   Oral          Pain Score       08/09/20 1044       2             Orthostatic Vital Signs  Vitals:    08/09/20 1044   BP: 168/82   Pulse: (!) 49       Physical Exam  Vitals signs reviewed  Constitutional:       General: She is not in acute distress  Appearance: Normal appearance  She is normal weight  She is not toxic-appearing  HENT:      Head: Normocephalic and atraumatic  Right Ear: External ear normal       Left Ear: External ear normal       Nose: Nose normal  No congestion  Mouth/Throat:      Mouth: Mucous membranes are moist       Pharynx: Oropharynx is clear  Eyes:      Extraocular Movements: Extraocular movements intact  Conjunctiva/sclera: Conjunctivae normal       Pupils: Pupils are equal, round, and reactive to light  Neck:      Musculoskeletal: Normal range of motion and neck supple  Cardiovascular:      Rate and Rhythm: Normal rate and regular rhythm        Pulses: Normal pulses  Heart sounds: No murmur  Pulmonary:      Effort: Pulmonary effort is normal  No respiratory distress  Breath sounds: Normal breath sounds  No stridor  No wheezing, rhonchi or rales  Abdominal:      General: Abdomen is flat  Bowel sounds are normal  There is no distension  Palpations: Abdomen is soft  Tenderness: There is abdominal tenderness in the periumbilical area  Musculoskeletal: Normal range of motion  General: No swelling or tenderness  Skin:     General: Skin is warm and dry  Coloration: Skin is not jaundiced  Findings: No bruising or erythema  Neurological:      General: No focal deficit present  Mental Status: She is alert and oriented to person, place, and time  Cranial Nerves: No cranial nerve deficit  Psychiatric:         Mood and Affect: Mood normal          Behavior: Behavior normal          ED Medications  Medications   sodium chloride 0 9 % bolus 1,000 mL (has no administration in time range)       Diagnostic Studies  Results Reviewed     Procedure Component Value Units Date/Time    CBC and differential [[id number]]     Lab Status:  No result Specimen:  Blood     Comprehensive metabolic panel [[id number]]     Lab Status:  No result Specimen:  Blood     Trauma tubes on hold [[id number]] Collected:  08/09/20 1126    Lab Status: In process Specimen:  Blood from Arm, Left Updated:  08/09/20 1132    Narrative: The following orders were created for panel order Trauma tubes on hold    Procedure                               Abnormality         Status                     ---------                               -----------         ------                     Maria T [place] Top on FPAZ[[id number]]                           In process                 Gold top on FSFV[[id number]]                                 In process                 Green / Yellow tube on RNCM[[id number]]                      In process                 Green / Black tube on KMZM[[id number]]                       In process                 Lavender Top 3 ml on XJRQ[[id number]]                        In process                 Lavender Top 7ml on SNNI[[id number]]                         In process                 Grey top tube on XJDN[[id number]]                            In process                   Please view results for these tests on the individual orders  CT abdomen pelvis with contrast    (Results Pending)         Procedures  Procedures      ED Course  ED Course as of Aug 09 1250   Sun Aug 09, 2020   1246 CT abdomen pelvis with contrast             MDM  Number of Diagnoses or Management Options  Diarrhea:   Diagnosis management comments: 18-year-old female presenting with blood in stool and diarrhea  History of IBS  CBC, CMP ordered; CT scan of the abdomen ordered  1 L normal saline bolus given  Rectal examination performed in the presence of Dr Velvet Pereyra, and did not show any blood, there was no evidence of an internal hemorrhoids or evidence of anal fissures  Small amount of stool sample submitted for occult bleeding, which was negative  CT scan of the abdomen with contrast revealed colonic diverticulosis; otherwise unremarkable  Physical examination does not explain presence of blood reported by the patient, although she now reports she had irritation from the eternal area of her anus and may have scratched it  Apparently the patient is stable, does not offer any complaints, has not had any more episodes of diarrhea  Patient instructed to follow-up with the PCP as soon as possible after discharge  Patient instructed to return to the ED for worsening of her symptoms including bloody diarrhea, return of her blood in the toilet, lightheadedness, chest pain, shortness of breath, loss of balance, syncope         Amount and/or Complexity of Data Reviewed  Clinical lab tests: ordered and reviewed  Tests in the radiology section of CPT®: ordered  Discuss the patient with other providers: yes  Independent visualization of images, tracings, or specimens: yes          Disposition  Final diagnoses:   None     ED Disposition     None      Follow-up Information    None         Patient's Medications   Discharge Prescriptions    No medications on file     No discharge procedures on file  PDMP Review     None           ED Provider  Attending physically available and evaluated Edyta Booth Stump  I managed the patient along with the ED Attending      Electronically Signed by         Tammy Lawson MD  08/09/20 0370

## 2020-08-13 ENCOUNTER — HOSPITAL ENCOUNTER (OUTPATIENT)
Dept: RADIOLOGY | Facility: MEDICAL CENTER | Age: 80
Discharge: HOME | End: 2020-08-13
Payer: MEDICARE

## 2020-08-13 DIAGNOSIS — Z13.820 SCREENING FOR OSTEOPOROSIS: ICD-10-CM

## 2020-08-13 DIAGNOSIS — M89.9 BONE DISORDER: ICD-10-CM

## 2020-08-13 PROCEDURE — 77080 DXA BONE DENSITY AXIAL: CPT

## 2020-08-18 NOTE — ED ATTENDING ATTESTATION
8/9/2020  IDarlyn MD, saw and evaluated the patient  I have discussed the patient with the resident/non-physician practitioner and agree with the resident's/non-physician practitioner's findings, Plan of Care, and MDM as documented in the resident's/non-physician practitioner's note, except where noted  All available labs and Radiology studies were reviewed  I was present for key portions of any procedure(s) performed by the resident/non-physician practitioner and I was immediately available to provide assistance  At this point I agree with the current assessment done in the Emergency Department  I have conducted an independent evaluation of this patient a history and physical is as follows:    ED Course   77 yo female with h/o HTN and IBS presents with watery diarrhea and abdominal cramping x 1 day  She noticed some blood in toilet and became concerned  Pt denies black/tarry stool  Reports h/o bowel issues 2/2 IBS and baseline has loose stools  Denies rectal pain, fever, or continued abdominal  Cramping  Denies n/v/cp/sob/URI symptoms/change in smell or taste, vaginal or urinary symptoms  Exam without significant TTP, rectal exam brown heme negative stool  Work up reassuring without significant leukocytosis, anemia or acute findings on CT imaging  Safe for dispo with close f/u or RTEr for worsening or progressive symptoms  ED Course as of Aug 18 0026   Ishan Liu Aug 09, 2020   1314 IMPRESSION:     Colonic diverticulosis without diverticulitis or other acute bowel findings      No acute inflammatory changes in the abdomen or the pelvis     CT abdomen pelvis with contrast         Critical Care Time  Procedures

## 2020-12-12 ENCOUNTER — HOSPITAL ENCOUNTER (INPATIENT)
Facility: HOSPITAL | Age: 80
LOS: 7 days | DRG: 177 | End: 2020-12-19
Attending: EMERGENCY MEDICINE | Admitting: INTERNAL MEDICINE
Payer: MEDICARE

## 2020-12-12 ENCOUNTER — APPOINTMENT (EMERGENCY)
Dept: RADIOLOGY | Facility: HOSPITAL | Age: 80
DRG: 177 | End: 2020-12-12
Payer: MEDICARE

## 2020-12-12 DIAGNOSIS — J18.9 MULTIFOCAL PNEUMONIA: Primary | ICD-10-CM

## 2020-12-12 DIAGNOSIS — U07.1 COVID-19 VIRUS INFECTION: ICD-10-CM

## 2020-12-12 DIAGNOSIS — R45.851 SUICIDAL IDEATION: ICD-10-CM

## 2020-12-12 DIAGNOSIS — F10.10 ALCOHOL ABUSE: ICD-10-CM

## 2020-12-12 DIAGNOSIS — J12.82 PNEUMONIA DUE TO COVID-19 VIRUS: ICD-10-CM

## 2020-12-12 DIAGNOSIS — N39.0 UTI (URINARY TRACT INFECTION): ICD-10-CM

## 2020-12-12 DIAGNOSIS — U07.1 PNEUMONIA DUE TO COVID-19 VIRUS: ICD-10-CM

## 2020-12-12 PROBLEM — R29.6 MULTIPLE FALLS: Status: ACTIVE | Noted: 2020-12-12

## 2020-12-12 LAB
ALBUMIN SERPL BCP-MCNC: 2.7 G/DL (ref 3.5–5)
ALP SERPL-CCNC: 87 U/L (ref 46–116)
ALT SERPL W P-5'-P-CCNC: 18 U/L (ref 12–78)
AMPHETAMINES SERPL QL SCN: NEGATIVE
ANION GAP SERPL CALCULATED.3IONS-SCNC: 4 MMOL/L (ref 4–13)
APTT PPP: 35 SECONDS (ref 23–37)
AST SERPL W P-5'-P-CCNC: 22 U/L (ref 5–45)
ATRIAL RATE: 85 BPM
BACTERIA UR QL AUTO: ABNORMAL /HPF
BARBITURATES UR QL: NEGATIVE
BASOPHILS # BLD AUTO: 0.03 THOUSANDS/ΜL (ref 0–0.1)
BASOPHILS NFR BLD AUTO: 1 % (ref 0–1)
BENZODIAZ UR QL: NEGATIVE
BILIRUB SERPL-MCNC: 0.3 MG/DL (ref 0.2–1)
BILIRUB UR QL STRIP: NEGATIVE
BUN SERPL-MCNC: 20 MG/DL (ref 5–25)
CALCIUM ALBUM COR SERPL-MCNC: 9.2 MG/DL (ref 8.3–10.1)
CALCIUM SERPL-MCNC: 8.2 MG/DL (ref 8.3–10.1)
CHLORIDE SERPL-SCNC: 104 MMOL/L (ref 100–108)
CLARITY UR: ABNORMAL
CO2 SERPL-SCNC: 30 MMOL/L (ref 21–32)
COCAINE UR QL: NEGATIVE
COLOR UR: YELLOW
CREAT SERPL-MCNC: 0.76 MG/DL (ref 0.6–1.3)
EOSINOPHIL # BLD AUTO: 0.18 THOUSAND/ΜL (ref 0–0.61)
EOSINOPHIL NFR BLD AUTO: 3 % (ref 0–6)
ERYTHROCYTE [DISTWIDTH] IN BLOOD BY AUTOMATED COUNT: 14.8 % (ref 11.6–15.1)
ETHANOL SERPL-MCNC: <3 MG/DL (ref 0–3)
GFR SERPL CREATININE-BSD FRML MDRD: 73 ML/MIN/1.73SQ M
GLUCOSE SERPL-MCNC: 89 MG/DL (ref 65–140)
GLUCOSE UR STRIP-MCNC: NEGATIVE MG/DL
HCT VFR BLD AUTO: 32.3 % (ref 34.8–46.1)
HGB BLD-MCNC: 10.4 G/DL (ref 11.5–15.4)
HGB UR QL STRIP.AUTO: ABNORMAL
IMM GRANULOCYTES # BLD AUTO: 0.03 THOUSAND/UL (ref 0–0.2)
IMM GRANULOCYTES NFR BLD AUTO: 1 % (ref 0–2)
INR PPP: 1.07 (ref 0.84–1.19)
KETONES UR STRIP-MCNC: ABNORMAL MG/DL
LACTATE SERPL-SCNC: 1 MMOL/L (ref 0.5–2)
LEUKOCYTE ESTERASE UR QL STRIP: ABNORMAL
LYMPHOCYTES # BLD AUTO: 0.93 THOUSANDS/ΜL (ref 0.6–4.47)
LYMPHOCYTES NFR BLD AUTO: 16 % (ref 14–44)
MAGNESIUM SERPL-MCNC: 1.8 MG/DL (ref 1.6–2.6)
MCH RBC QN AUTO: 29.1 PG (ref 26.8–34.3)
MCHC RBC AUTO-ENTMCNC: 32.2 G/DL (ref 31.4–37.4)
MCV RBC AUTO: 90 FL (ref 82–98)
METHADONE UR QL: NEGATIVE
MONOCYTES # BLD AUTO: 0.62 THOUSAND/ΜL (ref 0.17–1.22)
MONOCYTES NFR BLD AUTO: 11 % (ref 4–12)
NEUTROPHILS # BLD AUTO: 3.89 THOUSANDS/ΜL (ref 1.85–7.62)
NEUTS SEG NFR BLD AUTO: 68 % (ref 43–75)
NITRITE UR QL STRIP: POSITIVE
NON-SQ EPI CELLS URNS QL MICRO: ABNORMAL /HPF
NRBC BLD AUTO-RTO: 0 /100 WBCS
OPIATES UR QL SCN: NEGATIVE
OXYCODONE+OXYMORPHONE UR QL SCN: NEGATIVE
P AXIS: 70 DEGREES
PCP UR QL: NEGATIVE
PH UR STRIP.AUTO: 6 [PH]
PLATELET # BLD AUTO: 230 THOUSANDS/UL (ref 149–390)
PMV BLD AUTO: 10.3 FL (ref 8.9–12.7)
POTASSIUM SERPL-SCNC: 3.8 MMOL/L (ref 3.5–5.3)
PR INTERVAL: 140 MS
PROT SERPL-MCNC: 6.6 G/DL (ref 6.4–8.2)
PROT UR STRIP-MCNC: NEGATIVE MG/DL
PROTHROMBIN TIME: 14.1 SECONDS (ref 11.6–14.5)
QRS AXIS: 23 DEGREES
QRSD INTERVAL: 80 MS
QT INTERVAL: 394 MS
QTC INTERVAL: 468 MS
RBC # BLD AUTO: 3.58 MILLION/UL (ref 3.81–5.12)
RBC #/AREA URNS AUTO: ABNORMAL /HPF
SODIUM SERPL-SCNC: 138 MMOL/L (ref 136–145)
SP GR UR STRIP.AUTO: 1.02 (ref 1–1.03)
T WAVE AXIS: 48 DEGREES
THC UR QL: NEGATIVE
TROPONIN I SERPL-MCNC: <0.02 NG/ML
TSH SERPL DL<=0.05 MIU/L-ACNC: 1.86 UIU/ML (ref 0.36–3.74)
UROBILINOGEN UR QL STRIP.AUTO: 0.2 E.U./DL
VENTRICULAR RATE: 85 BPM
WBC # BLD AUTO: 5.68 THOUSAND/UL (ref 4.31–10.16)
WBC #/AREA URNS AUTO: ABNORMAL /HPF

## 2020-12-12 PROCEDURE — 81001 URINALYSIS AUTO W/SCOPE: CPT | Performed by: EMERGENCY MEDICINE

## 2020-12-12 PROCEDURE — 93005 ELECTROCARDIOGRAM TRACING: CPT

## 2020-12-12 PROCEDURE — 99285 EMERGENCY DEPT VISIT HI MDM: CPT

## 2020-12-12 PROCEDURE — 83605 ASSAY OF LACTIC ACID: CPT | Performed by: EMERGENCY MEDICINE

## 2020-12-12 PROCEDURE — 99223 1ST HOSP IP/OBS HIGH 75: CPT | Performed by: INTERNAL MEDICINE

## 2020-12-12 PROCEDURE — 80320 DRUG SCREEN QUANTALCOHOLS: CPT | Performed by: EMERGENCY MEDICINE

## 2020-12-12 PROCEDURE — 84443 ASSAY THYROID STIM HORMONE: CPT | Performed by: EMERGENCY MEDICINE

## 2020-12-12 PROCEDURE — 85025 COMPLETE CBC W/AUTO DIFF WBC: CPT | Performed by: EMERGENCY MEDICINE

## 2020-12-12 PROCEDURE — 85730 THROMBOPLASTIN TIME PARTIAL: CPT | Performed by: EMERGENCY MEDICINE

## 2020-12-12 PROCEDURE — 80307 DRUG TEST PRSMV CHEM ANLYZR: CPT | Performed by: EMERGENCY MEDICINE

## 2020-12-12 PROCEDURE — 1124F ACP DISCUSS-NO DSCNMKR DOCD: CPT | Performed by: EMERGENCY MEDICINE

## 2020-12-12 PROCEDURE — 85610 PROTHROMBIN TIME: CPT | Performed by: EMERGENCY MEDICINE

## 2020-12-12 PROCEDURE — 84484 ASSAY OF TROPONIN QUANT: CPT | Performed by: EMERGENCY MEDICINE

## 2020-12-12 PROCEDURE — 71045 X-RAY EXAM CHEST 1 VIEW: CPT

## 2020-12-12 PROCEDURE — 96374 THER/PROPH/DIAG INJ IV PUSH: CPT

## 2020-12-12 PROCEDURE — 36415 COLL VENOUS BLD VENIPUNCTURE: CPT | Performed by: EMERGENCY MEDICINE

## 2020-12-12 PROCEDURE — 96361 HYDRATE IV INFUSION ADD-ON: CPT

## 2020-12-12 PROCEDURE — 87040 BLOOD CULTURE FOR BACTERIA: CPT | Performed by: EMERGENCY MEDICINE

## 2020-12-12 PROCEDURE — 93010 ELECTROCARDIOGRAM REPORT: CPT | Performed by: INTERNAL MEDICINE

## 2020-12-12 PROCEDURE — 80053 COMPREHEN METABOLIC PANEL: CPT | Performed by: EMERGENCY MEDICINE

## 2020-12-12 PROCEDURE — 99285 EMERGENCY DEPT VISIT HI MDM: CPT | Performed by: EMERGENCY MEDICINE

## 2020-12-12 PROCEDURE — 83735 ASSAY OF MAGNESIUM: CPT | Performed by: EMERGENCY MEDICINE

## 2020-12-12 RX ORDER — TIZANIDINE 2 MG/1
4 TABLET ORAL 3 TIMES DAILY PRN
Status: DISCONTINUED | OUTPATIENT
Start: 2020-12-12 | End: 2020-12-19 | Stop reason: HOSPADM

## 2020-12-12 RX ORDER — MAGNESIUM HYDROXIDE/ALUMINUM HYDROXICE/SIMETHICONE 120; 1200; 1200 MG/30ML; MG/30ML; MG/30ML
30 SUSPENSION ORAL EVERY 6 HOURS PRN
Status: DISCONTINUED | OUTPATIENT
Start: 2020-12-12 | End: 2020-12-19 | Stop reason: HOSPADM

## 2020-12-12 RX ORDER — MELATONIN
2000 DAILY
Status: DISCONTINUED | OUTPATIENT
Start: 2020-12-12 | End: 2020-12-19 | Stop reason: HOSPADM

## 2020-12-12 RX ORDER — TIZANIDINE HYDROCHLORIDE 4 MG/1
4 CAPSULE, GELATIN COATED ORAL 3 TIMES DAILY
COMMUNITY
Start: 2019-12-20 | End: 2021-02-01 | Stop reason: HOSPADM

## 2020-12-12 RX ORDER — DONEPEZIL HYDROCHLORIDE 5 MG/1
10 TABLET, FILM COATED ORAL
Status: DISCONTINUED | OUTPATIENT
Start: 2020-12-12 | End: 2020-12-19 | Stop reason: HOSPADM

## 2020-12-12 RX ORDER — ACETAMINOPHEN 325 MG/1
650 TABLET ORAL EVERY 6 HOURS PRN
Status: DISCONTINUED | OUTPATIENT
Start: 2020-12-12 | End: 2020-12-16

## 2020-12-12 RX ORDER — ASCORBIC ACID 500 MG
1000 TABLET ORAL EVERY 12 HOURS SCHEDULED
Status: COMPLETED | OUTPATIENT
Start: 2020-12-12 | End: 2020-12-18

## 2020-12-12 RX ORDER — PANTOPRAZOLE SODIUM 20 MG/1
20 TABLET, DELAYED RELEASE ORAL
Status: DISCONTINUED | OUTPATIENT
Start: 2020-12-13 | End: 2020-12-19 | Stop reason: HOSPADM

## 2020-12-12 RX ORDER — ONDANSETRON 2 MG/ML
4 INJECTION INTRAMUSCULAR; INTRAVENOUS EVERY 6 HOURS PRN
Status: DISCONTINUED | OUTPATIENT
Start: 2020-12-12 | End: 2020-12-19 | Stop reason: HOSPADM

## 2020-12-12 RX ORDER — SODIUM CHLORIDE, SODIUM LACTATE, POTASSIUM CHLORIDE, CALCIUM CHLORIDE 600; 310; 30; 20 MG/100ML; MG/100ML; MG/100ML; MG/100ML
75 INJECTION, SOLUTION INTRAVENOUS CONTINUOUS
Status: DISCONTINUED | OUTPATIENT
Start: 2020-12-12 | End: 2020-12-17

## 2020-12-12 RX ORDER — MULTIVITAMIN/IRON/FOLIC ACID 18MG-0.4MG
1 TABLET ORAL DAILY
Status: DISCONTINUED | OUTPATIENT
Start: 2020-12-19 | End: 2020-12-19 | Stop reason: HOSPADM

## 2020-12-12 RX ORDER — LOPERAMIDE HYDROCHLORIDE 2 MG/1
2 CAPSULE ORAL 2 TIMES DAILY PRN
Status: DISCONTINUED | OUTPATIENT
Start: 2020-12-12 | End: 2020-12-12

## 2020-12-12 RX ORDER — ONDANSETRON 2 MG/ML
4 INJECTION INTRAMUSCULAR; INTRAVENOUS ONCE
Status: COMPLETED | OUTPATIENT
Start: 2020-12-12 | End: 2020-12-12

## 2020-12-12 RX ORDER — ZINC SULFATE 50(220)MG
220 CAPSULE ORAL DAILY
Status: COMPLETED | OUTPATIENT
Start: 2020-12-12 | End: 2020-12-18

## 2020-12-12 RX ORDER — NYSTATIN 100000 U/G
CREAM TOPICAL 2 TIMES DAILY
Status: DISCONTINUED | OUTPATIENT
Start: 2020-12-12 | End: 2020-12-19 | Stop reason: HOSPADM

## 2020-12-12 RX ORDER — LOPERAMIDE HYDROCHLORIDE 2 MG/1
2 CAPSULE ORAL 3 TIMES DAILY PRN
Status: DISCONTINUED | OUTPATIENT
Start: 2020-12-12 | End: 2020-12-19 | Stop reason: HOSPADM

## 2020-12-12 RX ADMIN — OXYCODONE HYDROCHLORIDE AND ACETAMINOPHEN 1000 MG: 500 TABLET ORAL at 14:08

## 2020-12-12 RX ADMIN — SODIUM CHLORIDE, SODIUM LACTATE, POTASSIUM CHLORIDE, AND CALCIUM CHLORIDE 75 ML/HR: .6; .31; .03; .02 INJECTION, SOLUTION INTRAVENOUS at 14:12

## 2020-12-12 RX ADMIN — ONDANSETRON 4 MG: 2 INJECTION INTRAMUSCULAR; INTRAVENOUS at 10:27

## 2020-12-12 RX ADMIN — CEFTRIAXONE SODIUM 1000 MG: 10 INJECTION, POWDER, FOR SOLUTION INTRAVENOUS at 11:42

## 2020-12-12 RX ADMIN — ACETAMINOPHEN 650 MG: 325 TABLET, FILM COATED ORAL at 19:59

## 2020-12-12 RX ADMIN — NYSTATIN: 100000 CREAM TOPICAL at 19:57

## 2020-12-12 RX ADMIN — Medication 2000 UNITS: at 14:08

## 2020-12-12 RX ADMIN — ENOXAPARIN SODIUM 30 MG: 30 INJECTION SUBCUTANEOUS at 21:35

## 2020-12-12 RX ADMIN — OXYCODONE HYDROCHLORIDE AND ACETAMINOPHEN 1000 MG: 500 TABLET ORAL at 21:35

## 2020-12-12 RX ADMIN — ZINC SULFATE 220 MG (50 MG) CAPSULE 220 MG: CAPSULE at 14:08

## 2020-12-12 RX ADMIN — DONEPEZIL HYDROCHLORIDE 10 MG: 5 TABLET ORAL at 21:35

## 2020-12-12 RX ADMIN — AZITHROMYCIN MONOHYDRATE 500 MG: 500 INJECTION, POWDER, LYOPHILIZED, FOR SOLUTION INTRAVENOUS at 12:21

## 2020-12-12 RX ADMIN — SODIUM CHLORIDE 1000 ML: 0.9 INJECTION, SOLUTION INTRAVENOUS at 10:23

## 2020-12-13 LAB
ALBUMIN SERPL BCP-MCNC: 2.1 G/DL (ref 3.5–5)
ALP SERPL-CCNC: 74 U/L (ref 46–116)
ALT SERPL W P-5'-P-CCNC: 13 U/L (ref 12–78)
ANION GAP SERPL CALCULATED.3IONS-SCNC: 9 MMOL/L (ref 4–13)
AST SERPL W P-5'-P-CCNC: 19 U/L (ref 5–45)
BASOPHILS # BLD AUTO: 0.04 THOUSANDS/ΜL (ref 0–0.1)
BASOPHILS NFR BLD AUTO: 1 % (ref 0–1)
BILIRUB SERPL-MCNC: 0.4 MG/DL (ref 0.2–1)
BUN SERPL-MCNC: 14 MG/DL (ref 5–25)
CALCIUM ALBUM COR SERPL-MCNC: 9.3 MG/DL (ref 8.3–10.1)
CALCIUM SERPL-MCNC: 7.8 MG/DL (ref 8.3–10.1)
CHLORIDE SERPL-SCNC: 107 MMOL/L (ref 100–108)
CK SERPL-CCNC: 31 U/L (ref 26–192)
CO2 SERPL-SCNC: 25 MMOL/L (ref 21–32)
CREAT SERPL-MCNC: 0.77 MG/DL (ref 0.6–1.3)
CRP SERPL QL: 75.9 MG/L
D DIMER PPP FEU-MCNC: 1.11 UG/ML FEU
EOSINOPHIL # BLD AUTO: 0.29 THOUSAND/ΜL (ref 0–0.61)
EOSINOPHIL NFR BLD AUTO: 6 % (ref 0–6)
ERYTHROCYTE [DISTWIDTH] IN BLOOD BY AUTOMATED COUNT: 14.9 % (ref 11.6–15.1)
FERRITIN SERPL-MCNC: 239 NG/ML (ref 8–388)
GFR SERPL CREATININE-BSD FRML MDRD: 72 ML/MIN/1.73SQ M
GLUCOSE SERPL-MCNC: 75 MG/DL (ref 65–140)
HCT VFR BLD AUTO: 29.4 % (ref 34.8–46.1)
HGB BLD-MCNC: 9.4 G/DL (ref 11.5–15.4)
IMM GRANULOCYTES # BLD AUTO: 0.03 THOUSAND/UL (ref 0–0.2)
IMM GRANULOCYTES NFR BLD AUTO: 1 % (ref 0–2)
LYMPHOCYTES # BLD AUTO: 1.46 THOUSANDS/ΜL (ref 0.6–4.47)
LYMPHOCYTES NFR BLD AUTO: 30 % (ref 14–44)
MCH RBC QN AUTO: 28.9 PG (ref 26.8–34.3)
MCHC RBC AUTO-ENTMCNC: 32 G/DL (ref 31.4–37.4)
MCV RBC AUTO: 91 FL (ref 82–98)
MONOCYTES # BLD AUTO: 0.62 THOUSAND/ΜL (ref 0.17–1.22)
MONOCYTES NFR BLD AUTO: 13 % (ref 4–12)
NEUTROPHILS # BLD AUTO: 2.42 THOUSANDS/ΜL (ref 1.85–7.62)
NEUTS SEG NFR BLD AUTO: 49 % (ref 43–75)
NRBC BLD AUTO-RTO: 0 /100 WBCS
NT-PROBNP SERPL-MCNC: 1970 PG/ML
PLATELET # BLD AUTO: 238 THOUSANDS/UL (ref 149–390)
PMV BLD AUTO: 10 FL (ref 8.9–12.7)
POTASSIUM SERPL-SCNC: 3.4 MMOL/L (ref 3.5–5.3)
PROCALCITONIN SERPL-MCNC: <0.05 NG/ML
PROT SERPL-MCNC: 5.7 G/DL (ref 6.4–8.2)
RBC # BLD AUTO: 3.25 MILLION/UL (ref 3.81–5.12)
SODIUM SERPL-SCNC: 141 MMOL/L (ref 136–145)
TROPONIN I SERPL-MCNC: <0.02 NG/ML
WBC # BLD AUTO: 4.86 THOUSAND/UL (ref 4.31–10.16)

## 2020-12-13 PROCEDURE — 86140 C-REACTIVE PROTEIN: CPT | Performed by: INTERNAL MEDICINE

## 2020-12-13 PROCEDURE — 82728 ASSAY OF FERRITIN: CPT | Performed by: INTERNAL MEDICINE

## 2020-12-13 PROCEDURE — 82550 ASSAY OF CK (CPK): CPT | Performed by: INTERNAL MEDICINE

## 2020-12-13 PROCEDURE — 87493 C DIFF AMPLIFIED PROBE: CPT | Performed by: INTERNAL MEDICINE

## 2020-12-13 PROCEDURE — 85025 COMPLETE CBC W/AUTO DIFF WBC: CPT | Performed by: INTERNAL MEDICINE

## 2020-12-13 PROCEDURE — 99233 SBSQ HOSP IP/OBS HIGH 50: CPT | Performed by: INTERNAL MEDICINE

## 2020-12-13 PROCEDURE — 85379 FIBRIN DEGRADATION QUANT: CPT | Performed by: INTERNAL MEDICINE

## 2020-12-13 PROCEDURE — 80053 COMPREHEN METABOLIC PANEL: CPT | Performed by: INTERNAL MEDICINE

## 2020-12-13 PROCEDURE — 84484 ASSAY OF TROPONIN QUANT: CPT | Performed by: INTERNAL MEDICINE

## 2020-12-13 PROCEDURE — 84145 PROCALCITONIN (PCT): CPT | Performed by: INTERNAL MEDICINE

## 2020-12-13 PROCEDURE — 83880 ASSAY OF NATRIURETIC PEPTIDE: CPT | Performed by: INTERNAL MEDICINE

## 2020-12-13 RX ORDER — POTASSIUM CHLORIDE 20 MEQ/1
20 TABLET, EXTENDED RELEASE ORAL ONCE
Status: COMPLETED | OUTPATIENT
Start: 2020-12-13 | End: 2020-12-13

## 2020-12-13 RX ADMIN — PANTOPRAZOLE SODIUM 20 MG: 20 TABLET, DELAYED RELEASE ORAL at 05:35

## 2020-12-13 RX ADMIN — NYSTATIN: 100000 CREAM TOPICAL at 08:55

## 2020-12-13 RX ADMIN — POTASSIUM CHLORIDE 20 MEQ: 1500 TABLET, EXTENDED RELEASE ORAL at 14:52

## 2020-12-13 RX ADMIN — ZINC SULFATE 220 MG (50 MG) CAPSULE 220 MG: CAPSULE at 08:53

## 2020-12-13 RX ADMIN — DONEPEZIL HYDROCHLORIDE 10 MG: 5 TABLET ORAL at 21:11

## 2020-12-13 RX ADMIN — ENOXAPARIN SODIUM 30 MG: 30 INJECTION SUBCUTANEOUS at 08:55

## 2020-12-13 RX ADMIN — SODIUM CHLORIDE, SODIUM LACTATE, POTASSIUM CHLORIDE, AND CALCIUM CHLORIDE 75 ML/HR: .6; .31; .03; .02 INJECTION, SOLUTION INTRAVENOUS at 01:32

## 2020-12-13 RX ADMIN — ALUMINA, MAGNESIA, AND SIMETHICONE ORAL SUSPENSION REGULAR STRENGTH 30 ML: 1200; 1200; 120 SUSPENSION ORAL at 14:52

## 2020-12-13 RX ADMIN — ACETAMINOPHEN 650 MG: 325 TABLET, FILM COATED ORAL at 23:29

## 2020-12-13 RX ADMIN — ENOXAPARIN SODIUM 30 MG: 30 INJECTION SUBCUTANEOUS at 21:11

## 2020-12-13 RX ADMIN — SODIUM CHLORIDE, SODIUM LACTATE, POTASSIUM CHLORIDE, AND CALCIUM CHLORIDE 75 ML/HR: .6; .31; .03; .02 INJECTION, SOLUTION INTRAVENOUS at 17:21

## 2020-12-13 RX ADMIN — CEFTRIAXONE SODIUM 1000 MG: 10 INJECTION, POWDER, FOR SOLUTION INTRAVENOUS at 08:58

## 2020-12-13 RX ADMIN — OXYCODONE HYDROCHLORIDE AND ACETAMINOPHEN 1000 MG: 500 TABLET ORAL at 08:53

## 2020-12-13 RX ADMIN — NYSTATIN: 100000 CREAM TOPICAL at 17:07

## 2020-12-13 RX ADMIN — AZITHROMYCIN MONOHYDRATE 500 MG: 500 INJECTION, POWDER, LYOPHILIZED, FOR SOLUTION INTRAVENOUS at 08:55

## 2020-12-13 RX ADMIN — LOPERAMIDE HYDROCHLORIDE 2 MG: 2 CAPSULE ORAL at 14:52

## 2020-12-13 RX ADMIN — Medication 2000 UNITS: at 08:53

## 2020-12-13 RX ADMIN — OXYCODONE HYDROCHLORIDE AND ACETAMINOPHEN 1000 MG: 500 TABLET ORAL at 21:11

## 2020-12-14 LAB
ALBUMIN SERPL BCP-MCNC: 2.3 G/DL (ref 3.5–5)
ALP SERPL-CCNC: 81 U/L (ref 46–116)
ALT SERPL W P-5'-P-CCNC: 12 U/L (ref 12–78)
ANION GAP SERPL CALCULATED.3IONS-SCNC: 10 MMOL/L (ref 4–13)
ANION GAP SERPL CALCULATED.3IONS-SCNC: 9 MMOL/L (ref 4–13)
AST SERPL W P-5'-P-CCNC: 15 U/L (ref 5–45)
BASOPHILS # BLD AUTO: 0.05 THOUSANDS/ΜL (ref 0–0.1)
BASOPHILS NFR BLD AUTO: 1 % (ref 0–1)
BILIRUB SERPL-MCNC: 0.47 MG/DL (ref 0.2–1)
BUN SERPL-MCNC: 7 MG/DL (ref 5–25)
BUN SERPL-MCNC: 8 MG/DL (ref 5–25)
C DIFF TOX B TCDB STL QL NAA+PROBE: NEGATIVE
CALCIUM ALBUM COR SERPL-MCNC: 9.9 MG/DL (ref 8.3–10.1)
CALCIUM SERPL-MCNC: 8.2 MG/DL (ref 8.3–10.1)
CALCIUM SERPL-MCNC: 8.5 MG/DL (ref 8.3–10.1)
CHLORIDE SERPL-SCNC: 103 MMOL/L (ref 100–108)
CHLORIDE SERPL-SCNC: 108 MMOL/L (ref 100–108)
CO2 SERPL-SCNC: 23 MMOL/L (ref 21–32)
CO2 SERPL-SCNC: 25 MMOL/L (ref 21–32)
CREAT SERPL-MCNC: 0.58 MG/DL (ref 0.6–1.3)
CREAT SERPL-MCNC: 0.73 MG/DL (ref 0.6–1.3)
EOSINOPHIL # BLD AUTO: 0.24 THOUSAND/ΜL (ref 0–0.61)
EOSINOPHIL NFR BLD AUTO: 4 % (ref 0–6)
ERYTHROCYTE [DISTWIDTH] IN BLOOD BY AUTOMATED COUNT: 14.6 % (ref 11.6–15.1)
ETHANOL SERPL-MCNC: <3 MG/DL (ref 0–3)
GFR SERPL CREATININE-BSD FRML MDRD: 77 ML/MIN/1.73SQ M
GFR SERPL CREATININE-BSD FRML MDRD: 86 ML/MIN/1.73SQ M
GLUCOSE SERPL-MCNC: 60 MG/DL (ref 65–140)
GLUCOSE SERPL-MCNC: 66 MG/DL (ref 65–140)
HCT VFR BLD AUTO: 29.9 % (ref 34.8–46.1)
HGB BLD-MCNC: 9.7 G/DL (ref 11.5–15.4)
IMM GRANULOCYTES # BLD AUTO: 0.01 THOUSAND/UL (ref 0–0.2)
IMM GRANULOCYTES NFR BLD AUTO: 0 % (ref 0–2)
LYMPHOCYTES # BLD AUTO: 1.55 THOUSANDS/ΜL (ref 0.6–4.47)
LYMPHOCYTES NFR BLD AUTO: 28 % (ref 14–44)
MCH RBC QN AUTO: 29 PG (ref 26.8–34.3)
MCHC RBC AUTO-ENTMCNC: 32.4 G/DL (ref 31.4–37.4)
MCV RBC AUTO: 89 FL (ref 82–98)
MONOCYTES # BLD AUTO: 0.81 THOUSAND/ΜL (ref 0.17–1.22)
MONOCYTES NFR BLD AUTO: 15 % (ref 4–12)
NEUTROPHILS # BLD AUTO: 2.86 THOUSANDS/ΜL (ref 1.85–7.62)
NEUTS SEG NFR BLD AUTO: 52 % (ref 43–75)
NRBC BLD AUTO-RTO: 0 /100 WBCS
PLATELET # BLD AUTO: 268 THOUSANDS/UL (ref 149–390)
PMV BLD AUTO: 9.6 FL (ref 8.9–12.7)
POTASSIUM SERPL-SCNC: 3.5 MMOL/L (ref 3.5–5.3)
POTASSIUM SERPL-SCNC: 3.6 MMOL/L (ref 3.5–5.3)
PROCALCITONIN SERPL-MCNC: <0.05 NG/ML
PROT SERPL-MCNC: 6.2 G/DL (ref 6.4–8.2)
RBC # BLD AUTO: 3.35 MILLION/UL (ref 3.81–5.12)
SODIUM SERPL-SCNC: 138 MMOL/L (ref 136–145)
SODIUM SERPL-SCNC: 140 MMOL/L (ref 136–145)
TSH SERPL DL<=0.05 MIU/L-ACNC: 3.83 UIU/ML (ref 0.36–3.74)
WBC # BLD AUTO: 5.52 THOUSAND/UL (ref 4.31–10.16)

## 2020-12-14 PROCEDURE — 84145 PROCALCITONIN (PCT): CPT | Performed by: INTERNAL MEDICINE

## 2020-12-14 PROCEDURE — 97163 PT EVAL HIGH COMPLEX 45 MIN: CPT

## 2020-12-14 PROCEDURE — 99221 1ST HOSP IP/OBS SF/LOW 40: CPT | Performed by: PSYCHIATRY & NEUROLOGY

## 2020-12-14 PROCEDURE — 84443 ASSAY THYROID STIM HORMONE: CPT | Performed by: INTERNAL MEDICINE

## 2020-12-14 PROCEDURE — 99233 SBSQ HOSP IP/OBS HIGH 50: CPT | Performed by: INTERNAL MEDICINE

## 2020-12-14 PROCEDURE — 80048 BASIC METABOLIC PNL TOTAL CA: CPT | Performed by: INTERNAL MEDICINE

## 2020-12-14 PROCEDURE — 85025 COMPLETE CBC W/AUTO DIFF WBC: CPT | Performed by: INTERNAL MEDICINE

## 2020-12-14 PROCEDURE — 80053 COMPREHEN METABOLIC PANEL: CPT | Performed by: INTERNAL MEDICINE

## 2020-12-14 PROCEDURE — 80320 DRUG SCREEN QUANTALCOHOLS: CPT | Performed by: INTERNAL MEDICINE

## 2020-12-14 RX ORDER — LOPERAMIDE HYDROCHLORIDE 2 MG/1
2 CAPSULE ORAL 3 TIMES DAILY PRN
Qty: 30 CAPSULE | Refills: 0 | Status: SHIPPED | OUTPATIENT
Start: 2020-12-14

## 2020-12-14 RX ADMIN — ZINC SULFATE 220 MG (50 MG) CAPSULE 220 MG: CAPSULE at 09:55

## 2020-12-14 RX ADMIN — SODIUM CHLORIDE, SODIUM LACTATE, POTASSIUM CHLORIDE, AND CALCIUM CHLORIDE 75 ML/HR: .6; .31; .03; .02 INJECTION, SOLUTION INTRAVENOUS at 19:46

## 2020-12-14 RX ADMIN — Medication 2000 UNITS: at 09:55

## 2020-12-14 RX ADMIN — ENOXAPARIN SODIUM 30 MG: 30 INJECTION SUBCUTANEOUS at 21:27

## 2020-12-14 RX ADMIN — AZITHROMYCIN MONOHYDRATE 500 MG: 500 INJECTION, POWDER, LYOPHILIZED, FOR SOLUTION INTRAVENOUS at 09:54

## 2020-12-14 RX ADMIN — NYSTATIN: 100000 CREAM TOPICAL at 17:58

## 2020-12-14 RX ADMIN — PANTOPRAZOLE SODIUM 20 MG: 20 TABLET, DELAYED RELEASE ORAL at 06:28

## 2020-12-14 RX ADMIN — OXYCODONE HYDROCHLORIDE AND ACETAMINOPHEN 1000 MG: 500 TABLET ORAL at 21:26

## 2020-12-14 RX ADMIN — NYSTATIN: 100000 CREAM TOPICAL at 09:55

## 2020-12-14 RX ADMIN — TIZANIDINE 4 MG: 2 TABLET ORAL at 21:26

## 2020-12-14 RX ADMIN — OXYCODONE HYDROCHLORIDE AND ACETAMINOPHEN 1000 MG: 500 TABLET ORAL at 09:55

## 2020-12-14 RX ADMIN — DONEPEZIL HYDROCHLORIDE 10 MG: 5 TABLET ORAL at 21:27

## 2020-12-14 RX ADMIN — ENOXAPARIN SODIUM 30 MG: 30 INJECTION SUBCUTANEOUS at 09:54

## 2020-12-14 RX ADMIN — ACETAMINOPHEN 650 MG: 325 TABLET, FILM COATED ORAL at 23:21

## 2020-12-15 LAB
AMPHETAMINES SERPL QL SCN: NEGATIVE
ANION GAP SERPL CALCULATED.3IONS-SCNC: 14 MMOL/L (ref 4–13)
BACTERIA UR QL AUTO: ABNORMAL /HPF
BARBITURATES UR QL: NEGATIVE
BASOPHILS # BLD AUTO: 0.04 THOUSANDS/ΜL (ref 0–0.1)
BASOPHILS NFR BLD AUTO: 1 % (ref 0–1)
BENZODIAZ UR QL: NEGATIVE
BILIRUB UR QL STRIP: NEGATIVE
BUN SERPL-MCNC: 11 MG/DL (ref 5–25)
CALCIUM SERPL-MCNC: 8.5 MG/DL (ref 8.3–10.1)
CHLORIDE SERPL-SCNC: 101 MMOL/L (ref 100–108)
CLARITY UR: CLEAR
CO2 SERPL-SCNC: 22 MMOL/L (ref 21–32)
COCAINE UR QL: NEGATIVE
COLOR UR: YELLOW
CREAT SERPL-MCNC: 0.69 MG/DL (ref 0.6–1.3)
EOSINOPHIL # BLD AUTO: 0.13 THOUSAND/ΜL (ref 0–0.61)
EOSINOPHIL NFR BLD AUTO: 2 % (ref 0–6)
ERYTHROCYTE [DISTWIDTH] IN BLOOD BY AUTOMATED COUNT: 14.6 % (ref 11.6–15.1)
GFR SERPL CREATININE-BSD FRML MDRD: 81 ML/MIN/1.73SQ M
GLUCOSE SERPL-MCNC: 61 MG/DL (ref 65–140)
GLUCOSE UR STRIP-MCNC: NEGATIVE MG/DL
HCT VFR BLD AUTO: 30.6 % (ref 34.8–46.1)
HGB BLD-MCNC: 9.9 G/DL (ref 11.5–15.4)
HGB UR QL STRIP.AUTO: ABNORMAL
IMM GRANULOCYTES # BLD AUTO: 0.03 THOUSAND/UL (ref 0–0.2)
IMM GRANULOCYTES NFR BLD AUTO: 0 % (ref 0–2)
KETONES UR STRIP-MCNC: ABNORMAL MG/DL
LEUKOCYTE ESTERASE UR QL STRIP: ABNORMAL
LYMPHOCYTES # BLD AUTO: 1.28 THOUSANDS/ΜL (ref 0.6–4.47)
LYMPHOCYTES NFR BLD AUTO: 19 % (ref 14–44)
MCH RBC QN AUTO: 29 PG (ref 26.8–34.3)
MCHC RBC AUTO-ENTMCNC: 32.4 G/DL (ref 31.4–37.4)
MCV RBC AUTO: 90 FL (ref 82–98)
METHADONE UR QL: NEGATIVE
MONOCYTES # BLD AUTO: 1.02 THOUSAND/ΜL (ref 0.17–1.22)
MONOCYTES NFR BLD AUTO: 15 % (ref 4–12)
MUCOUS THREADS UR QL AUTO: ABNORMAL
NEUTROPHILS # BLD AUTO: 4.31 THOUSANDS/ΜL (ref 1.85–7.62)
NEUTS SEG NFR BLD AUTO: 63 % (ref 43–75)
NITRITE UR QL STRIP: NEGATIVE
NON-SQ EPI CELLS URNS QL MICRO: ABNORMAL /HPF
NRBC BLD AUTO-RTO: 0 /100 WBCS
OPIATES UR QL SCN: NEGATIVE
OXYCODONE+OXYMORPHONE UR QL SCN: NEGATIVE
PCP UR QL: NEGATIVE
PH UR STRIP.AUTO: 6 [PH]
PLATELET # BLD AUTO: 320 THOUSANDS/UL (ref 149–390)
PMV BLD AUTO: 9.9 FL (ref 8.9–12.7)
POTASSIUM SERPL-SCNC: 3.4 MMOL/L (ref 3.5–5.3)
PROT UR STRIP-MCNC: NEGATIVE MG/DL
RBC # BLD AUTO: 3.41 MILLION/UL (ref 3.81–5.12)
RBC #/AREA URNS AUTO: ABNORMAL /HPF
SODIUM SERPL-SCNC: 137 MMOL/L (ref 136–145)
SP GR UR STRIP.AUTO: 1.02 (ref 1–1.03)
THC UR QL: NEGATIVE
UROBILINOGEN UR QL STRIP.AUTO: 0.2 E.U./DL
WBC # BLD AUTO: 6.81 THOUSAND/UL (ref 4.31–10.16)
WBC #/AREA URNS AUTO: ABNORMAL /HPF

## 2020-12-15 PROCEDURE — 99232 SBSQ HOSP IP/OBS MODERATE 35: CPT | Performed by: STUDENT IN AN ORGANIZED HEALTH CARE EDUCATION/TRAINING PROGRAM

## 2020-12-15 PROCEDURE — 80048 BASIC METABOLIC PNL TOTAL CA: CPT | Performed by: INTERNAL MEDICINE

## 2020-12-15 PROCEDURE — 85025 COMPLETE CBC W/AUTO DIFF WBC: CPT | Performed by: INTERNAL MEDICINE

## 2020-12-15 PROCEDURE — 80307 DRUG TEST PRSMV CHEM ANLYZR: CPT | Performed by: INTERNAL MEDICINE

## 2020-12-15 PROCEDURE — 81001 URINALYSIS AUTO W/SCOPE: CPT | Performed by: INTERNAL MEDICINE

## 2020-12-15 RX ADMIN — ACETAMINOPHEN 650 MG: 325 TABLET, FILM COATED ORAL at 15:25

## 2020-12-15 RX ADMIN — ENOXAPARIN SODIUM 30 MG: 30 INJECTION SUBCUTANEOUS at 08:31

## 2020-12-15 RX ADMIN — OXYCODONE HYDROCHLORIDE AND ACETAMINOPHEN 1000 MG: 500 TABLET ORAL at 08:31

## 2020-12-15 RX ADMIN — NYSTATIN: 100000 CREAM TOPICAL at 18:27

## 2020-12-15 RX ADMIN — NYSTATIN: 100000 CREAM TOPICAL at 08:31

## 2020-12-15 RX ADMIN — DONEPEZIL HYDROCHLORIDE 10 MG: 5 TABLET ORAL at 22:14

## 2020-12-15 RX ADMIN — SODIUM CHLORIDE, SODIUM LACTATE, POTASSIUM CHLORIDE, AND CALCIUM CHLORIDE 75 ML/HR: .6; .31; .03; .02 INJECTION, SOLUTION INTRAVENOUS at 18:27

## 2020-12-15 RX ADMIN — OXYCODONE HYDROCHLORIDE AND ACETAMINOPHEN 1000 MG: 500 TABLET ORAL at 22:13

## 2020-12-15 RX ADMIN — PANTOPRAZOLE SODIUM 20 MG: 20 TABLET, DELAYED RELEASE ORAL at 05:58

## 2020-12-15 RX ADMIN — Medication 2000 UNITS: at 08:31

## 2020-12-15 RX ADMIN — ENOXAPARIN SODIUM 30 MG: 30 INJECTION SUBCUTANEOUS at 22:14

## 2020-12-15 RX ADMIN — ZINC SULFATE 220 MG (50 MG) CAPSULE 220 MG: CAPSULE at 08:31

## 2020-12-15 RX ADMIN — ACETAMINOPHEN 650 MG: 325 TABLET, FILM COATED ORAL at 23:14

## 2020-12-16 LAB
ANION GAP SERPL CALCULATED.3IONS-SCNC: 9 MMOL/L (ref 4–13)
BASOPHILS # BLD AUTO: 0.04 THOUSANDS/ΜL (ref 0–0.1)
BASOPHILS NFR BLD AUTO: 1 % (ref 0–1)
BUN SERPL-MCNC: 9 MG/DL (ref 5–25)
CALCIUM SERPL-MCNC: 8 MG/DL (ref 8.3–10.1)
CHLORIDE SERPL-SCNC: 101 MMOL/L (ref 100–108)
CO2 SERPL-SCNC: 27 MMOL/L (ref 21–32)
CREAT SERPL-MCNC: 0.62 MG/DL (ref 0.6–1.3)
EOSINOPHIL # BLD AUTO: 0.13 THOUSAND/ΜL (ref 0–0.61)
EOSINOPHIL NFR BLD AUTO: 2 % (ref 0–6)
ERYTHROCYTE [DISTWIDTH] IN BLOOD BY AUTOMATED COUNT: 14.6 % (ref 11.6–15.1)
GFR SERPL CREATININE-BSD FRML MDRD: 84 ML/MIN/1.73SQ M
GLUCOSE SERPL-MCNC: 88 MG/DL (ref 65–140)
HCT VFR BLD AUTO: 28.9 % (ref 34.8–46.1)
HGB BLD-MCNC: 9.6 G/DL (ref 11.5–15.4)
IMM GRANULOCYTES # BLD AUTO: 0.03 THOUSAND/UL (ref 0–0.2)
IMM GRANULOCYTES NFR BLD AUTO: 1 % (ref 0–2)
LYMPHOCYTES # BLD AUTO: 1.27 THOUSANDS/ΜL (ref 0.6–4.47)
LYMPHOCYTES NFR BLD AUTO: 22 % (ref 14–44)
MCH RBC QN AUTO: 29.5 PG (ref 26.8–34.3)
MCHC RBC AUTO-ENTMCNC: 33.2 G/DL (ref 31.4–37.4)
MCV RBC AUTO: 89 FL (ref 82–98)
MONOCYTES # BLD AUTO: 0.73 THOUSAND/ΜL (ref 0.17–1.22)
MONOCYTES NFR BLD AUTO: 13 % (ref 4–12)
NEUTROPHILS # BLD AUTO: 3.52 THOUSANDS/ΜL (ref 1.85–7.62)
NEUTS SEG NFR BLD AUTO: 61 % (ref 43–75)
NRBC BLD AUTO-RTO: 0 /100 WBCS
PLATELET # BLD AUTO: 335 THOUSANDS/UL (ref 149–390)
PMV BLD AUTO: 10 FL (ref 8.9–12.7)
POTASSIUM SERPL-SCNC: 2.9 MMOL/L (ref 3.5–5.3)
RBC # BLD AUTO: 3.25 MILLION/UL (ref 3.81–5.12)
SODIUM SERPL-SCNC: 137 MMOL/L (ref 136–145)
WBC # BLD AUTO: 5.72 THOUSAND/UL (ref 4.31–10.16)

## 2020-12-16 PROCEDURE — 85025 COMPLETE CBC W/AUTO DIFF WBC: CPT | Performed by: INTERNAL MEDICINE

## 2020-12-16 PROCEDURE — 80048 BASIC METABOLIC PNL TOTAL CA: CPT | Performed by: INTERNAL MEDICINE

## 2020-12-16 PROCEDURE — 99232 SBSQ HOSP IP/OBS MODERATE 35: CPT | Performed by: STUDENT IN AN ORGANIZED HEALTH CARE EDUCATION/TRAINING PROGRAM

## 2020-12-16 RX ORDER — ACETAMINOPHEN 325 MG/1
650 TABLET ORAL EVERY 6 HOURS PRN
Status: DISCONTINUED | OUTPATIENT
Start: 2020-12-16 | End: 2020-12-19 | Stop reason: HOSPADM

## 2020-12-16 RX ORDER — POTASSIUM CHLORIDE 14.9 MG/ML
20 INJECTION INTRAVENOUS ONCE
Status: COMPLETED | OUTPATIENT
Start: 2020-12-16 | End: 2020-12-16

## 2020-12-16 RX ORDER — POTASSIUM CHLORIDE 20 MEQ/1
40 TABLET, EXTENDED RELEASE ORAL 2 TIMES DAILY
Status: DISCONTINUED | OUTPATIENT
Start: 2020-12-16 | End: 2020-12-19 | Stop reason: HOSPADM

## 2020-12-16 RX ADMIN — Medication 2000 UNITS: at 09:59

## 2020-12-16 RX ADMIN — DONEPEZIL HYDROCHLORIDE 10 MG: 5 TABLET ORAL at 21:22

## 2020-12-16 RX ADMIN — NYSTATIN: 100000 CREAM TOPICAL at 18:19

## 2020-12-16 RX ADMIN — OXYCODONE HYDROCHLORIDE AND ACETAMINOPHEN 1000 MG: 500 TABLET ORAL at 09:55

## 2020-12-16 RX ADMIN — NYSTATIN: 100000 CREAM TOPICAL at 09:58

## 2020-12-16 RX ADMIN — OXYCODONE HYDROCHLORIDE AND ACETAMINOPHEN 1000 MG: 500 TABLET ORAL at 21:22

## 2020-12-16 RX ADMIN — ZINC SULFATE 220 MG (50 MG) CAPSULE 220 MG: CAPSULE at 09:59

## 2020-12-16 RX ADMIN — SODIUM CHLORIDE, SODIUM LACTATE, POTASSIUM CHLORIDE, AND CALCIUM CHLORIDE 75 ML/HR: .6; .31; .03; .02 INJECTION, SOLUTION INTRAVENOUS at 21:17

## 2020-12-16 RX ADMIN — ENOXAPARIN SODIUM 30 MG: 30 INJECTION SUBCUTANEOUS at 09:58

## 2020-12-16 RX ADMIN — ACETAMINOPHEN 650 MG: 325 TABLET, FILM COATED ORAL at 16:36

## 2020-12-16 RX ADMIN — ENOXAPARIN SODIUM 30 MG: 30 INJECTION SUBCUTANEOUS at 21:22

## 2020-12-16 RX ADMIN — POTASSIUM CHLORIDE 40 MEQ: 1500 TABLET, EXTENDED RELEASE ORAL at 16:36

## 2020-12-16 RX ADMIN — POTASSIUM CHLORIDE 20 MEQ: 200 INJECTION, SOLUTION INTRAVENOUS at 16:36

## 2020-12-16 RX ADMIN — SODIUM CHLORIDE, SODIUM LACTATE, POTASSIUM CHLORIDE, AND CALCIUM CHLORIDE 75 ML/HR: .6; .31; .03; .02 INJECTION, SOLUTION INTRAVENOUS at 07:04

## 2020-12-17 LAB
ANION GAP SERPL CALCULATED.3IONS-SCNC: 11 MMOL/L (ref 4–13)
BASOPHILS # BLD AUTO: 0.05 THOUSANDS/ΜL (ref 0–0.1)
BASOPHILS NFR BLD AUTO: 1 % (ref 0–1)
BUN SERPL-MCNC: 8 MG/DL (ref 5–25)
CALCIUM SERPL-MCNC: 8.4 MG/DL (ref 8.3–10.1)
CHLORIDE SERPL-SCNC: 98 MMOL/L (ref 100–108)
CO2 SERPL-SCNC: 26 MMOL/L (ref 21–32)
CREAT SERPL-MCNC: 0.69 MG/DL (ref 0.6–1.3)
EOSINOPHIL # BLD AUTO: 0.07 THOUSAND/ΜL (ref 0–0.61)
EOSINOPHIL NFR BLD AUTO: 1 % (ref 0–6)
ERYTHROCYTE [DISTWIDTH] IN BLOOD BY AUTOMATED COUNT: 14.6 % (ref 11.6–15.1)
GFR SERPL CREATININE-BSD FRML MDRD: 81 ML/MIN/1.73SQ M
GLUCOSE SERPL-MCNC: 89 MG/DL (ref 65–140)
HCT VFR BLD AUTO: 31.4 % (ref 34.8–46.1)
HGB BLD-MCNC: 10.4 G/DL (ref 11.5–15.4)
IMM GRANULOCYTES # BLD AUTO: 0.03 THOUSAND/UL (ref 0–0.2)
IMM GRANULOCYTES NFR BLD AUTO: 1 % (ref 0–2)
LYMPHOCYTES # BLD AUTO: 1.2 THOUSANDS/ΜL (ref 0.6–4.47)
LYMPHOCYTES NFR BLD AUTO: 19 % (ref 14–44)
MCH RBC QN AUTO: 28.7 PG (ref 26.8–34.3)
MCHC RBC AUTO-ENTMCNC: 33.1 G/DL (ref 31.4–37.4)
MCV RBC AUTO: 87 FL (ref 82–98)
MONOCYTES # BLD AUTO: 0.66 THOUSAND/ΜL (ref 0.17–1.22)
MONOCYTES NFR BLD AUTO: 10 % (ref 4–12)
NEUTROPHILS # BLD AUTO: 4.37 THOUSANDS/ΜL (ref 1.85–7.62)
NEUTS SEG NFR BLD AUTO: 68 % (ref 43–75)
NRBC BLD AUTO-RTO: 0 /100 WBCS
PLATELET # BLD AUTO: 356 THOUSANDS/UL (ref 149–390)
PMV BLD AUTO: 9.2 FL (ref 8.9–12.7)
POTASSIUM SERPL-SCNC: 3.8 MMOL/L (ref 3.5–5.3)
RBC # BLD AUTO: 3.63 MILLION/UL (ref 3.81–5.12)
SODIUM SERPL-SCNC: 135 MMOL/L (ref 136–145)
WBC # BLD AUTO: 6.38 THOUSAND/UL (ref 4.31–10.16)

## 2020-12-17 PROCEDURE — 99232 SBSQ HOSP IP/OBS MODERATE 35: CPT | Performed by: STUDENT IN AN ORGANIZED HEALTH CARE EDUCATION/TRAINING PROGRAM

## 2020-12-17 PROCEDURE — 85025 COMPLETE CBC W/AUTO DIFF WBC: CPT | Performed by: STUDENT IN AN ORGANIZED HEALTH CARE EDUCATION/TRAINING PROGRAM

## 2020-12-17 PROCEDURE — 80048 BASIC METABOLIC PNL TOTAL CA: CPT | Performed by: STUDENT IN AN ORGANIZED HEALTH CARE EDUCATION/TRAINING PROGRAM

## 2020-12-17 PROCEDURE — 97167 OT EVAL HIGH COMPLEX 60 MIN: CPT

## 2020-12-17 RX ADMIN — OXYCODONE HYDROCHLORIDE AND ACETAMINOPHEN 1000 MG: 500 TABLET ORAL at 09:31

## 2020-12-17 RX ADMIN — PANTOPRAZOLE SODIUM 20 MG: 20 TABLET, DELAYED RELEASE ORAL at 06:30

## 2020-12-17 RX ADMIN — POTASSIUM CHLORIDE 40 MEQ: 1500 TABLET, EXTENDED RELEASE ORAL at 09:30

## 2020-12-17 RX ADMIN — OXYCODONE HYDROCHLORIDE AND ACETAMINOPHEN 1000 MG: 500 TABLET ORAL at 21:31

## 2020-12-17 RX ADMIN — ENOXAPARIN SODIUM 30 MG: 30 INJECTION SUBCUTANEOUS at 21:31

## 2020-12-17 RX ADMIN — NYSTATIN: 100000 CREAM TOPICAL at 09:31

## 2020-12-17 RX ADMIN — Medication 2000 UNITS: at 09:31

## 2020-12-17 RX ADMIN — POTASSIUM CHLORIDE 40 MEQ: 1500 TABLET, EXTENDED RELEASE ORAL at 17:46

## 2020-12-17 RX ADMIN — ACETAMINOPHEN 650 MG: 325 TABLET, FILM COATED ORAL at 21:31

## 2020-12-17 RX ADMIN — NYSTATIN: 100000 CREAM TOPICAL at 17:46

## 2020-12-17 RX ADMIN — DONEPEZIL HYDROCHLORIDE 10 MG: 5 TABLET ORAL at 21:31

## 2020-12-17 RX ADMIN — ACETAMINOPHEN 650 MG: 325 TABLET, FILM COATED ORAL at 09:52

## 2020-12-17 RX ADMIN — ZINC SULFATE 220 MG (50 MG) CAPSULE 220 MG: CAPSULE at 09:30

## 2020-12-17 RX ADMIN — ENOXAPARIN SODIUM 30 MG: 30 INJECTION SUBCUTANEOUS at 09:30

## 2020-12-18 LAB
ANION GAP SERPL CALCULATED.3IONS-SCNC: 9 MMOL/L (ref 4–13)
BACTERIA BLD CULT: NORMAL
BACTERIA BLD CULT: NORMAL
BUN SERPL-MCNC: 12 MG/DL (ref 5–25)
CALCIUM SERPL-MCNC: 8.3 MG/DL (ref 8.3–10.1)
CHLORIDE SERPL-SCNC: 105 MMOL/L (ref 100–108)
CO2 SERPL-SCNC: 26 MMOL/L (ref 21–32)
CREAT SERPL-MCNC: 0.72 MG/DL (ref 0.6–1.3)
FLUAV RNA RESP QL NAA+PROBE: NEGATIVE
FLUBV RNA RESP QL NAA+PROBE: NEGATIVE
GFR SERPL CREATININE-BSD FRML MDRD: 78 ML/MIN/1.73SQ M
GLUCOSE SERPL-MCNC: 82 MG/DL (ref 65–140)
POTASSIUM SERPL-SCNC: 4.5 MMOL/L (ref 3.5–5.3)
RSV RNA RESP QL NAA+PROBE: NEGATIVE
SARS-COV-2 RNA RESP QL NAA+PROBE: POSITIVE
SODIUM SERPL-SCNC: 140 MMOL/L (ref 136–145)

## 2020-12-18 PROCEDURE — 0241U HB NFCT DS VIR RESP RNA 4 TRGT: CPT | Performed by: STUDENT IN AN ORGANIZED HEALTH CARE EDUCATION/TRAINING PROGRAM

## 2020-12-18 PROCEDURE — 99232 SBSQ HOSP IP/OBS MODERATE 35: CPT | Performed by: STUDENT IN AN ORGANIZED HEALTH CARE EDUCATION/TRAINING PROGRAM

## 2020-12-18 PROCEDURE — 80048 BASIC METABOLIC PNL TOTAL CA: CPT | Performed by: INTERNAL MEDICINE

## 2020-12-18 RX ADMIN — OXYCODONE HYDROCHLORIDE AND ACETAMINOPHEN 1000 MG: 500 TABLET ORAL at 09:12

## 2020-12-18 RX ADMIN — NYSTATIN: 100000 CREAM TOPICAL at 09:15

## 2020-12-18 RX ADMIN — ENOXAPARIN SODIUM 30 MG: 30 INJECTION SUBCUTANEOUS at 09:15

## 2020-12-18 RX ADMIN — PANTOPRAZOLE SODIUM 20 MG: 20 TABLET, DELAYED RELEASE ORAL at 05:46

## 2020-12-18 RX ADMIN — Medication 2000 UNITS: at 09:12

## 2020-12-18 RX ADMIN — POTASSIUM CHLORIDE 40 MEQ: 1500 TABLET, EXTENDED RELEASE ORAL at 09:12

## 2020-12-18 RX ADMIN — ACETAMINOPHEN 650 MG: 325 TABLET, FILM COATED ORAL at 09:11

## 2020-12-18 RX ADMIN — OXYCODONE HYDROCHLORIDE AND ACETAMINOPHEN 1000 MG: 500 TABLET ORAL at 21:26

## 2020-12-18 RX ADMIN — ENOXAPARIN SODIUM 30 MG: 30 INJECTION SUBCUTANEOUS at 21:26

## 2020-12-18 RX ADMIN — ZINC SULFATE 220 MG (50 MG) CAPSULE 220 MG: CAPSULE at 09:12

## 2020-12-18 RX ADMIN — ACETAMINOPHEN 650 MG: 325 TABLET, FILM COATED ORAL at 21:26

## 2020-12-18 RX ADMIN — POTASSIUM CHLORIDE 40 MEQ: 1500 TABLET, EXTENDED RELEASE ORAL at 17:53

## 2020-12-18 RX ADMIN — DONEPEZIL HYDROCHLORIDE 10 MG: 5 TABLET ORAL at 21:26

## 2020-12-19 ENCOUNTER — HOSPITAL ENCOUNTER (INPATIENT)
Facility: HOSPITAL | Age: 80
LOS: 44 days | Discharge: PSYCHIATRIC OR SUBSTANCE USE FACILITY | DRG: 885 | End: 2021-02-01
Attending: PSYCHIATRY & NEUROLOGY | Admitting: PSYCHIATRY & NEUROLOGY
Payer: MEDICARE

## 2020-12-19 VITALS
HEIGHT: 66 IN | SYSTOLIC BLOOD PRESSURE: 145 MMHG | BODY MASS INDEX: 19.45 KG/M2 | DIASTOLIC BLOOD PRESSURE: 82 MMHG | WEIGHT: 121.03 LBS | RESPIRATION RATE: 18 BRPM | TEMPERATURE: 98.5 F | OXYGEN SATURATION: 94 % | HEART RATE: 95 BPM

## 2020-12-19 DIAGNOSIS — F10.10 ALCOHOL ABUSE: ICD-10-CM

## 2020-12-19 DIAGNOSIS — F33.2 SEVERE RECURRENT MAJOR DEPRESSION WITHOUT PSYCHOTIC FEATURES (HCC): Primary | ICD-10-CM

## 2020-12-19 DIAGNOSIS — K58.0 IRRITABLE BOWEL SYNDROME WITH DIARRHEA: ICD-10-CM

## 2020-12-19 DIAGNOSIS — U07.1 COVID-19 VIRUS INFECTION: ICD-10-CM

## 2020-12-19 DIAGNOSIS — J12.82 PNEUMONIA DUE TO COVID-19 VIRUS: ICD-10-CM

## 2020-12-19 DIAGNOSIS — F03.90 MAJOR NEUROCOGNITIVE DISORDER (HCC): ICD-10-CM

## 2020-12-19 DIAGNOSIS — B35.1 ONYCHOMYCOSIS OF TOENAIL: ICD-10-CM

## 2020-12-19 DIAGNOSIS — U07.1 PNEUMONIA DUE TO COVID-19 VIRUS: ICD-10-CM

## 2020-12-19 PROCEDURE — 99239 HOSP IP/OBS DSCHRG MGMT >30: CPT | Performed by: STUDENT IN AN ORGANIZED HEALTH CARE EDUCATION/TRAINING PROGRAM

## 2020-12-19 RX ORDER — MULTIVITAMIN/IRON/FOLIC ACID 18MG-0.4MG
1 TABLET ORAL DAILY
Status: CANCELLED | OUTPATIENT
Start: 2020-12-20

## 2020-12-19 RX ORDER — HYDROXYZINE HYDROCHLORIDE 25 MG/1
25 TABLET, FILM COATED ORAL EVERY 6 HOURS PRN
Status: DISCONTINUED | OUTPATIENT
Start: 2020-12-19 | End: 2021-02-01 | Stop reason: HOSPADM

## 2020-12-19 RX ORDER — PANTOPRAZOLE SODIUM 20 MG/1
20 TABLET, DELAYED RELEASE ORAL
Status: CANCELLED | OUTPATIENT
Start: 2020-12-20

## 2020-12-19 RX ORDER — LORAZEPAM 2 MG/ML
1 INJECTION INTRAMUSCULAR EVERY 6 HOURS PRN
Status: DISCONTINUED | OUTPATIENT
Start: 2020-12-19 | End: 2021-02-01 | Stop reason: HOSPADM

## 2020-12-19 RX ORDER — HALOPERIDOL 5 MG/ML
2 INJECTION INTRAMUSCULAR EVERY 6 HOURS PRN
Status: DISCONTINUED | OUTPATIENT
Start: 2020-12-19 | End: 2021-02-01 | Stop reason: HOSPADM

## 2020-12-19 RX ORDER — HYDROXYZINE HYDROCHLORIDE 25 MG/1
25 TABLET, FILM COATED ORAL EVERY 6 HOURS PRN
Status: CANCELLED | OUTPATIENT
Start: 2020-12-19

## 2020-12-19 RX ORDER — MELATONIN
2000 DAILY
Status: DISCONTINUED | OUTPATIENT
Start: 2020-12-20 | End: 2021-02-01 | Stop reason: HOSPADM

## 2020-12-19 RX ORDER — NYSTATIN 100000 U/G
CREAM TOPICAL 2 TIMES DAILY
Status: DISCONTINUED | OUTPATIENT
Start: 2020-12-19 | End: 2021-01-15

## 2020-12-19 RX ORDER — ACETAMINOPHEN 325 MG/1
650 TABLET ORAL EVERY 4 HOURS PRN
Status: CANCELLED | OUTPATIENT
Start: 2020-12-19

## 2020-12-19 RX ORDER — POTASSIUM CHLORIDE 20 MEQ/1
40 TABLET, EXTENDED RELEASE ORAL 2 TIMES DAILY
Status: CANCELLED | OUTPATIENT
Start: 2020-12-19

## 2020-12-19 RX ORDER — PANTOPRAZOLE SODIUM 20 MG/1
20 TABLET, DELAYED RELEASE ORAL
Status: DISCONTINUED | OUTPATIENT
Start: 2020-12-20 | End: 2021-02-01 | Stop reason: HOSPADM

## 2020-12-19 RX ORDER — HALOPERIDOL 1 MG/1
0.5 TABLET ORAL EVERY 6 HOURS PRN
Status: CANCELLED | OUTPATIENT
Start: 2020-12-19

## 2020-12-19 RX ORDER — HALOPERIDOL 1 MG/1
1 TABLET ORAL EVERY 8 HOURS PRN
Status: DISCONTINUED | OUTPATIENT
Start: 2020-12-19 | End: 2021-02-01 | Stop reason: HOSPADM

## 2020-12-19 RX ORDER — HALOPERIDOL 1 MG/1
1 TABLET ORAL EVERY 8 HOURS PRN
Status: CANCELLED | OUTPATIENT
Start: 2020-12-19

## 2020-12-19 RX ORDER — HALOPERIDOL 5 MG/ML
2 INJECTION INTRAMUSCULAR EVERY 6 HOURS PRN
Status: CANCELLED | OUTPATIENT
Start: 2020-12-19

## 2020-12-19 RX ORDER — LORAZEPAM 1 MG/1
1 TABLET ORAL EVERY 6 HOURS PRN
Status: CANCELLED | OUTPATIENT
Start: 2020-12-19

## 2020-12-19 RX ORDER — LORAZEPAM 2 MG/ML
1 INJECTION INTRAMUSCULAR EVERY 6 HOURS PRN
Status: CANCELLED | OUTPATIENT
Start: 2020-12-19

## 2020-12-19 RX ORDER — NYSTATIN 100000 U/G
CREAM TOPICAL 2 TIMES DAILY
Status: CANCELLED | OUTPATIENT
Start: 2020-12-19

## 2020-12-19 RX ORDER — ACETAMINOPHEN 325 MG/1
650 TABLET ORAL EVERY 6 HOURS PRN
Status: CANCELLED | OUTPATIENT
Start: 2020-12-19

## 2020-12-19 RX ORDER — TRAZODONE HYDROCHLORIDE 50 MG/1
50 TABLET ORAL
Status: CANCELLED | OUTPATIENT
Start: 2020-12-19

## 2020-12-19 RX ORDER — DONEPEZIL HYDROCHLORIDE 5 MG/1
10 TABLET, FILM COATED ORAL
Status: CANCELLED | OUTPATIENT
Start: 2020-12-19

## 2020-12-19 RX ORDER — ACETAMINOPHEN 325 MG/1
975 TABLET ORAL EVERY 6 HOURS PRN
Status: CANCELLED | OUTPATIENT
Start: 2020-12-19

## 2020-12-19 RX ORDER — LORAZEPAM 1 MG/1
1 TABLET ORAL EVERY 6 HOURS PRN
Status: DISCONTINUED | OUTPATIENT
Start: 2020-12-19 | End: 2021-02-01 | Stop reason: HOSPADM

## 2020-12-19 RX ORDER — TRAZODONE HYDROCHLORIDE 50 MG/1
50 TABLET ORAL
Status: DISCONTINUED | OUTPATIENT
Start: 2020-12-19 | End: 2021-02-01 | Stop reason: HOSPADM

## 2020-12-19 RX ORDER — HALOPERIDOL 0.5 MG/1
0.5 TABLET ORAL EVERY 6 HOURS PRN
Status: DISCONTINUED | OUTPATIENT
Start: 2020-12-19 | End: 2021-02-01 | Stop reason: HOSPADM

## 2020-12-19 RX ORDER — MELATONIN
2000 DAILY
Status: CANCELLED | OUTPATIENT
Start: 2020-12-20

## 2020-12-19 RX ORDER — ASCORBIC ACID 500 MG
1000 TABLET ORAL EVERY 12 HOURS SCHEDULED
Status: CANCELLED | OUTPATIENT
Start: 2020-12-19 | End: 2020-12-26

## 2020-12-19 RX ORDER — ACETAMINOPHEN 325 MG/1
650 TABLET ORAL EVERY 6 HOURS PRN
Status: DISCONTINUED | OUTPATIENT
Start: 2020-12-19 | End: 2021-02-01 | Stop reason: HOSPADM

## 2020-12-19 RX ORDER — DONEPEZIL HYDROCHLORIDE 5 MG/1
10 TABLET, FILM COATED ORAL
Status: DISCONTINUED | OUTPATIENT
Start: 2020-12-19 | End: 2021-02-01 | Stop reason: HOSPADM

## 2020-12-19 RX ORDER — ASCORBIC ACID 500 MG
1000 TABLET ORAL EVERY 12 HOURS SCHEDULED
Status: COMPLETED | OUTPATIENT
Start: 2020-12-19 | End: 2020-12-26

## 2020-12-19 RX ADMIN — NYSTATIN 1 APPLICATION: 100000 CREAM TOPICAL at 21:09

## 2020-12-19 RX ADMIN — DONEPEZIL HYDROCHLORIDE 10 MG: 10 TABLET, FILM COATED ORAL at 21:09

## 2020-12-19 RX ADMIN — NYSTATIN: 100000 CREAM TOPICAL at 10:00

## 2020-12-19 RX ADMIN — POTASSIUM CHLORIDE 40 MEQ: 1500 TABLET, EXTENDED RELEASE ORAL at 10:00

## 2020-12-19 RX ADMIN — Medication 1 TABLET: at 10:00

## 2020-12-19 RX ADMIN — ENOXAPARIN SODIUM 30 MG: 30 INJECTION SUBCUTANEOUS at 10:00

## 2020-12-19 RX ADMIN — Medication 2000 UNITS: at 10:00

## 2020-12-19 RX ADMIN — ONDANSETRON 4 MG: 2 INJECTION INTRAMUSCULAR; INTRAVENOUS at 02:06

## 2020-12-19 RX ADMIN — OXYCODONE HYDROCHLORIDE AND ACETAMINOPHEN 1000 MG: 500 TABLET ORAL at 21:09

## 2020-12-19 NOTE — PLAN OF CARE
Problem: Risk for Self Injury/Neglect  Goal: Treatment Goal: Remain safe during length of stay, learn and adopt new coping skills, and be free of self-injurious ideation, impulses and acts at the time of discharge  Outcome: Progressing     Problem: Depression  Goal: Treatment Goal: Demonstrate behavioral control of depressive symptoms, verbalize feelings of improved mood/affect, and adopt new coping skills prior to discharge  Outcome: Progressing  Goal: Verbalize thoughts and feelings  Description: Interventions:  - Assess and re-assess patient's level of risk   - Engage patient in 1:1 interactions, daily, for a minimum of 15 minutes   - Encourage patient to express feelings, fears, frustrations, hopes   Outcome: Progressing  Goal: Refrain from harming self  Description: Interventions:  - Monitor patient closely, per order   - Supervise medication ingestion, monitor effects and side effects   Outcome: Progressing  Goal: Refrain from isolation  Description: Interventions:  - Develop a trusting relationship   - Encourage socialization   Outcome: Progressing  Goal: Refrain from self-neglect  Outcome: Progressing  Goal: Attend and participate in unit activities, including therapeutic, recreational, and educational groups  Description: Interventions:  - Provide therapeutic and educational activities daily, encourage attendance and participation, and document same in the medical record   Outcome: Progressing  Goal: Complete daily ADLs, including personal hygiene independently, as able  Description: Interventions:  - Observe, teach, and assist patient with ADLS  -  Monitor and promote a balance of rest/activity, with adequate nutrition and elimination   Outcome: Progressing     Problem: Anxiety  Goal: Anxiety is at manageable level  Description: Interventions:  - Assess and monitor patient's anxiety level     - Monitor for signs and symptoms (heart palpitations, chest pain, shortness of breath, headaches, nausea, feeling jumpy, restlessness, irritable, apprehensive)  - Collaborate with interdisciplinary team and initiate plan and interventions as ordered    - [place] patient to unit/surroundings  - Explain treatment plan  - Encourage participation in care  - Encourage verbalization of concerns/fears  - Identify coping mechanisms  - Assist in developing anxiety-reducing skills  - Administer/offer alternative therapies  - Limit or eliminate stimulants  Outcome: Progressing

## 2020-12-19 NOTE — NURSING NOTE
Patient is a 89 voluntary admission arriving to the unit From [facility] ED  Patient made statements to a family member that she had nothing to live for and wanted to walk into traffic following her  who passed, according to patient yesterday  Per reports  had covid and was on a ventilator  Patient is well oriented but a poor historian and c/o being forgetful, answered many questions with "I don't remember"  Patient endorses having some psychiatric history but is nonspecific in her description she does not know if she ever attempted suicide and reports occasional visual hallucinations  Patient is depressed and hopeless in affect  She denies having SI currently but states that she believes she would walk into traffic if she were at home

## 2020-12-20 PROBLEM — F10.20 ALCOHOL USE DISORDER, MODERATE, IN CONTROLLED ENVIRONMENT (HCC): Status: ACTIVE | Noted: 2020-12-20

## 2020-12-20 PROBLEM — R41.89 COGNITIVE DEFICITS: Status: ACTIVE | Noted: 2020-12-20

## 2020-12-20 PROBLEM — F33.2 SEVERE RECURRENT MAJOR DEPRESSION WITHOUT PSYCHOTIC FEATURES (HCC): Status: ACTIVE | Noted: 2020-12-20

## 2020-12-20 PROBLEM — F41.9 ANXIETY: Status: ACTIVE | Noted: 2020-12-20

## 2020-12-20 PROBLEM — Z91.81 HISTORY OF FALL: Status: ACTIVE | Noted: 2020-12-20

## 2020-12-20 PROBLEM — D63.8 ANEMIA OF CHRONIC DISEASE: Status: ACTIVE | Noted: 2020-12-20

## 2020-12-20 PROCEDURE — 99222 1ST HOSP IP/OBS MODERATE 55: CPT | Performed by: INTERNAL MEDICINE

## 2020-12-20 PROCEDURE — 99222 1ST HOSP IP/OBS MODERATE 55: CPT | Performed by: PSYCHIATRY & NEUROLOGY

## 2020-12-20 RX ORDER — FOLIC ACID 1 MG/1
1 TABLET ORAL DAILY
Status: DISCONTINUED | OUTPATIENT
Start: 2020-12-20 | End: 2021-02-01 | Stop reason: HOSPADM

## 2020-12-20 RX ORDER — MIRTAZAPINE 7.5 MG/1
7.5 TABLET, FILM COATED ORAL
Status: DISCONTINUED | OUTPATIENT
Start: 2020-12-20 | End: 2020-12-21

## 2020-12-20 RX ORDER — THIAMINE MONONITRATE (VIT B1) 100 MG
100 TABLET ORAL DAILY
Status: DISCONTINUED | OUTPATIENT
Start: 2020-12-20 | End: 2021-02-01 | Stop reason: HOSPADM

## 2020-12-20 RX ADMIN — Medication 1 TABLET: at 09:39

## 2020-12-20 RX ADMIN — NYSTATIN: 100000 CREAM TOPICAL at 17:48

## 2020-12-20 RX ADMIN — FOLIC ACID 1 MG: 1 TABLET ORAL at 09:39

## 2020-12-20 RX ADMIN — APIXABAN 2.5 MG: 2.5 TABLET, FILM COATED ORAL at 09:39

## 2020-12-20 RX ADMIN — DONEPEZIL HYDROCHLORIDE 10 MG: 10 TABLET, FILM COATED ORAL at 21:13

## 2020-12-20 RX ADMIN — MIRTAZAPINE 7.5 MG: 7.5 TABLET, FILM COATED ORAL at 21:12

## 2020-12-20 RX ADMIN — Medication 2000 UNITS: at 09:39

## 2020-12-20 RX ADMIN — PANTOPRAZOLE SODIUM 20 MG: 20 TABLET, DELAYED RELEASE ORAL at 06:23

## 2020-12-20 RX ADMIN — OXYCODONE HYDROCHLORIDE AND ACETAMINOPHEN 1000 MG: 500 TABLET ORAL at 09:39

## 2020-12-20 RX ADMIN — APIXABAN 2.5 MG: 2.5 TABLET, FILM COATED ORAL at 17:47

## 2020-12-20 RX ADMIN — OXYCODONE HYDROCHLORIDE AND ACETAMINOPHEN 1000 MG: 500 TABLET ORAL at 21:12

## 2020-12-20 RX ADMIN — THIAMINE HCL TAB 100 MG 100 MG: 100 TAB at 09:39

## 2020-12-20 NOTE — ASSESSMENT & PLAN NOTE
Continues to oxygenate on room air - on mild treatment pathway  Continue supplemental vitamin C&D w/ multivitamin regimen - completed a [employer] course prior to transfer  Supportive care

## 2020-12-20 NOTE — CONSULTS
Consult - [facility]  Patient: Adamaris Jerome 78 y o  female MRN: [id number]  Unit/Bed#: Devan Hernandez 040-55 Encounter: [id number]  Primary Care Provider: Malini Sosa DO  Date Of Visit: 12/20/20        Assessment & Plan:    * Depression with suicidal ideation  Assessment & Plan  Management per primary service (psychiatry)    COVID-19 virus infection  Assessment & Plan  Continues to oxygenate on room air - on mild treatment pathway  Continue supplemental vitamin C&D w/ multivitamin regimen - completed a Zinc course prior to transfer  Supportive care    History of alcohol abuse  Assessment & Plan  Thiamine/folic acid supplementation on board  CIWA protocol discontinued prior to acute care discharge    Anemia of chronic disease  Assessment & Plan  H/H stable      ECT Clearance:    History of recent seizure or stroke:  None noted per chart review   History of pheochromocytoma:  None noted per chart review   History of active bleeding (Intracranial hemorrhage, aneurysm or AVM):  None noted per chart review   History of metallic implants in the head or neck:  None noted per chart review   History of increased intracranial pressure with mass effect:  None noted per chart review   Does the patient have a current arrhythmia? None noted per chart review     Based on above criteria, Patient is medically cleared for ECT should it be recommended unless otherwise contraindicated  Counseling / Coordination of Care Time: 45 minutes  Greater than 50% of total time spent on patient counseling and coordination of care  Collaboration of Care: Were Recommendations Directly Discussed with Primary Treatment Team? - Yes (w/ U nursing)        History of Present Illness:    Paula Saravia is a 78 y o  female who is originally admitted to the psychiatry service on 12/19/2020 due to depression with suicidal ideation    The hospitalist service has been consulted for assistance in medical management and for medical clearance for admission to the behavioral health unit  She was discharged from the acute care hospital setting yesterday where she was found to be COVID positive and placed on the mild treatment pathway due to not requiring supplemental oxygenation  She has been stable off antibiotics and remains on vitamin supplementation  She has been transferred here to the [facility] Unit due to depression with suicidal ideation for psychiatric evaluation/treatment  Review of Systems:    Review of Systems - A thorough 12 point review systems was conducted  Pertinent positives and negatives are mentioned in the history of present illness  Past Medical and Surgical History:     Past Medical History:   Diagnosis Date    Hypertension     IBS (irritable bowel syndrome)        No past surgical history on file  Medications & Allergies:    Prior to Admission medications    Medication Sig Start Date End Date Taking? Authorizing Provider   apixaban (ELIQUIS) 2 5 mg Take 1 tablet (2 5 mg total) by mouth 2 (two) times a day 12/14/20 1/14/21  Christopher Aponte MD   dilTIAZem HCl (CARDIZEM PO) Take by mouth    Historical Provider, MD   donepezil (ARICEPT) 10 mg tablet Take 10 mg by mouth daily at bedtime    Historical Provider, MD   loperamide (IMODIUM) 2 mg capsule Take 1 capsule (2 mg total) by mouth 3 (three) times a day as needed for diarrhea 12/14/20   Christopher Aponte MD   Multiple Vitamin (MULTIVITAMIN) tablet Take 1 tablet by mouth daily    Historical Provider, MD   omeprazole (PriLOSEC) 10 mg delayed release capsule Take 10 mg by mouth daily    Historical Provider, MD   TiZANidine (ZANAFLEX) 4 MG capsule Take 4 mg by mouth Three times a day 12/20/19 12/19/20  Historical Provider, MD         Allergies:    Allergies   Allergen Reactions    Penicillins      Does not know reaction         Social History:    Substance Use History:   Social History     Substance and Sexual Activity   Alcohol Use Yes    Alcohol/week: 21 0 standard drinks    Types: 21 Cans of beer per week    Frequency: Monthly or less    Drinks per session: 1 or 2    Comment: states 3 beers/night     Social History     Tobacco Use   Smoking Status Former Smoker   Smokeless Tobacco Never Used     Social History     Substance and Sexual Activity   Drug Use No         Family History:    No pertinent family history      Physical Exam: (obtained via nursing evaluation and psychiatrist exam today, along with chart review, in attempt to limit multiple health staff exposure to a COVID+ patient)    Vitals:   Blood Pressure: 117/73 (12/20/20 1217)  Pulse: 95 (12/20/20 1217)  Temperature: 97 6 °F (36 4 °C) (12/20/20 1217)  Temp Source: Tympanic (12/20/20 1217)  Respirations: 16 (12/20/20 1217)  Height: 5' 6" (167 6 cm) (12/19/20 1802)  Weight - Scale: 54 4 kg (120 lb) (12/19/20 1802)  SpO2: 92 % (12/20/20 1217)      GENERAL:  No immediate distress at rest  HEAD:  Normocephalic - atraumatic  EYES: PERRL - EOMI   MOUTH:  Mucosa moist  CARDIAC:  Rate controlled  PULMONARY:  Clear to auscultation - nonlabored respirations at rest per nursing  ABDOMEN:  Soft - nontender/nondistended - active bowel sounds  MUSCULOSKELETAL:  motor strength/range of motion intact per nursing  NEUROLOGIC:  At baseline per chart review  SKIN:  Age-appropriate wrinkles/blemishes   PSYCHIATRIC:  Calm/cooperative - mild paranoia noted on psychiatrist exam      Additional Data:     Labs & Recent Cultures:    Results from last 7 days   Lab Units 12/17/20  0517   WBC Thousand/uL 6 38   HEMOGLOBIN g/dL 10 4*   HEMATOCRIT % 31 4*   PLATELETS Thousands/uL 356   NEUTROS PCT % 68   LYMPHS PCT % 19   MONOS PCT % 10   EOS PCT % 1     Results from last 7 days   Lab Units 12/18/20  0518  12/14/20  0459   SODIUM mmol/L 140   < > 140  138   POTASSIUM mmol/L 4 5   < > 3 6  3 5   CHLORIDE mmol/L 105   < > 108  103   CO2 mmol/L 26   < > 23  25   BUN mg/dL 12   < > 7  8   CREATININE mg/dL 0 72   < > 0 58*  0 73   ANION GAP mmol/L 9   < > 9  10   CALCIUM mg/dL 8 3   < > 8 5  8 2*   ALBUMIN g/dL  --   --  2 3*   TOTAL BILIRUBIN mg/dL  --   --  0 47   ALK PHOS U/L  --   --  81   ALT U/L  --   --  12   AST U/L  --   --  15   GLUCOSE RANDOM mg/dL 82   < > 60*  66    < > = values in this interval not displayed  Results from last 7 days   Lab Units 12/14/20  0459   PROCALCITONIN ng/ml <0 05               Thank you for consulting the hospitalist service for assistance in medical management and clearance of your behavioral health patient  Please do not hesitate to contact us if further assistance is necessary  Seen By:  Desiree Aleman MD      ** Please Note: This note is constructed using a voice recognition dictation system  An occasional wrong word/phrase or sound-a-like substitution may have been picked up by dictation device due to the inherent limitations of voice recognition software  Read the chart carefully and recognize, using reasonable context, where substitutions may have occurred  **

## 2020-12-20 NOTE — H&P
Psychiatric Evaluation - [address] Stump 78 y o  female MRN: [id number]  Unit/Bed#: Ruma Martinez 127-27 Encounter: [id number]    Assessment/Plan   Principal Problem:    Severe recurrent major depression without psychotic features ([facility] [address] )  Active Problems:    COVID-19 virus infection    Suicidal ideation    History of fall    Alcohol use disorder  (HCC)    Cognitive deficits    Anxiety    Plan:   Start Remeron 7 5 mg q h s  For management of depression including insomnia, poor appetite, suicidal thoughts  Defer starting additional medications, as the patient states she does not like to take too many pills, and if she is prescribed too many, she will not take them  Admission labs  Frequent safety checks and vitals per unit protocol  Collaborate with family for baseline assessment and disposition planning  Case discussed with treatment team   Treatment options and alternatives were reviewed with the patient       -----------------------------------    Chief Complaint:  Depression, suicidal thoughts    History of Present Illness     Vickie Brown is a 78 y o  female with history of alcohol use disorder, cognitive deficit on Aricept, and recurrent major depressive disorder who presents from medical floor voluntarily via a 201 due to suicidal thoughts since early December with verbalized plan [place], apparently prompted by her 's deteriorating condition due to [facility]  Patient was initially admitted on December 12 to the medical unit due to multiple falls (thought to be related to heavy alcohol use) and was found to have COVID, though she was largely asymptomatic and was treated only for a mild case  During her hospital stay, her  did pass away from [facility]   She was seen via tele psychiatry due to her suicidal thoughts, though she was reportedly a poor historian and denied problems with alcohol, so much of the history was obtained from the patient's daughter at that time  She was also monitored on CIWA protocol, which was discontinued by the end of her medical hospitalization  Vahid Shown states she has been having suicidal thoughts with a plan to walk in to traffic since before her  became sick with COVID  She cannot recall any specific triggers or stressors at that time and cannot say why her depression started  However, she reports worsening of her suicidal thoughts since her 's death 2 days ago  She states she has not acted on these thoughts yet because of her family, though she cannot say at this time whether she would make a suicide attempt if discharge  She does report of history of depression in the past but is unable to elaborate regarding her previous treatment  She currently reports poor sleep, frequently falling asleep too early and only sleeping about 1 hour at a time  She reports low energy at times with low appetite, which she states is her baseline  She continues with suicidal thoughts and contracts for safety in the hospital but states that she is afraid to be alone  She states she is likewise afraid to be alone at home because she worries about someone potentially breaking into her home, so she has been discussing possible living arrangements with her children since she would otherwise be living alone following discharge  She denies any homicidal thoughts, auditory or visual hallucinations, or symptoms suggestive of psychosis, bipolar disorder, or PTSD  Medical Review Of Systems:  Complete review of systems is negative except as noted above      Psychiatric Review Of Systems:  Problems with sleep: yes, decreased  Appetite changes: Low at baseline  Weight changes: no  Low energy/anergy: yes  Low interest/pleasure/anhedonia: no  Somatic symptoms: no  Anxiety/panic:  Yes  Yara: no  Guilt/hopeless: no  Self injurious behavior/risky behavior: no    Historical Information     Psychiatric History:   Prior psychiatric diagnoses: Depression  Inpatient hospitalizations:  Patient does not recall  Suicide attempts: Patient does not recall  Self-harm behaviors:  Denies  Violent behavior: Denies  Outpatient treatment: Patient does not recall, states she possibly saw psychiatrist many years ago  Psychiatric medication trial: Patient does not recall; chart indicates Aricept only    Substance Abuse History:  Social History     Tobacco Use    Smoking status: Former Smoker    Smokeless tobacco: Never Used   Substance Use Topics    Alcohol use: Yes     Alcohol/week: 21 0 standard drinks     Types: 21 Cans of beer per week     Frequency: Monthly or less     Drinks per session: 1 or 2     Comment: states 3 beers/night    Drug use: No      Patient reports infrequent alcohol use with her last drink and late November  She minimizes her alcohol use and does not wish to discuss were states she cannot remember specifics  I have assessed this patient for substance use within the past 12 months  Family Psychiatric History:   Patient denies any known family history of psychiatric illness, suicide attempt, or substance abuse    Social History  Marital history:     Children: yes, 4 children in different states  Living arrangement:  Previously living in a home with her , who passed away 2 days ago  Functioning Relationships: good support system  Education: high school diploma/GED  Occupational History:  Retired    Other Pertinent History: None      Traumatic History:   Abuse: none reported  Other Traumatic Events: none reported    Past Medical History:   Past Medical History:   Diagnosis Date    Hypertension     IBS (irritable bowel syndrome)         -----------------------------------  Objective    Temp:  [97 9 °F (36 6 °C)-98 4 °F (36 9 °C)] 97 9 °F (36 6 °C)  HR:  [] 92  Resp:  [14-16] 16  BP: (137-139)/(70-85) 139/70    Mental Status Exam:  Appearance:  alert, good eye contact, appropriate grooming and hygiene and thin Behavior:  calm and cooperative   Motor: no abnormal movements and slow gait   Speech:  spontaneous and coherent   Mood:  anxious   Affect:  mood-congruent and appropriate range   Thought Process:  circumstantial   Thought Content: mild paranoia   Perceptual disturbances: no reported hallucinations and does not appear to be responding to internal stimuli at this time   Risk Potential: No active homicidal ideation, Suicidal Ideation with plan To walk into traffic near her home, Low potential for aggression based on previous behavior   Cognition: oriented to self and situation, appears to be of average intelligence and cognition not formally tested   Insight:  Limited   Judgment: Limited       Meds/Allergies   Allergies   Allergen Reactions    Penicillins      Does not know reaction     all current active meds have been reviewed    Behavioral Health Medications: all current active meds have been reviewed  Changes as in Plan section above  Laboratory results:  I have personally reviewed all pertinent laboratory/tests results  Recent Results (from the past 50 hour(s))   COVID19, Influenza A/B, RSV PCR, [facility]    Collection Time: 12/18/20  5:52 PM    Specimen: Nose; Nares   Result Value Ref Range    SARS-CoV-2 Positive (A) Negative    INFLUENZA A PCR Negative Negative    INFLUENZA B PCR Negative Negative    RSV PCR Negative Negative        Imaging Studies:   No orders to display            -----------------------------------    Risks / Benefits of Treatment:  Risks, benefits, and possible side effects of medications were explained to patient  The patient was able to verbalize understanding and agree for treatment  Counseling / Coordination of Care:  Patient's presentation on admission and proposed treatment plan were discussed with the treatment team   Diagnosis, medication changes and treatment plan were reviewed with the patient  Recent stressors were discussed with the patient    Events leading to admission were reviewed with the patient  Importance of medication and treatment compliance was reviewed with the patient  Inpatient Psychiatric Certification:     Certification: Based upon physical, mental and social evaluations, I certify that inpatient psychiatric services are medically necessary for this patient for a duration of 7 midnights for the treatment of Severe recurrent major depression without psychotic features ([facility] [address] )  Available alternative community resources do not meet the patient's mental health care needs  I further attest that an established written individualized plan of care has been implemented and is outlined in the patient's medical records  Cheyenne Arellano MD    This note was not shared with the patient due to this is a psychotherapy note    This note has been constructed using a voice recognition system  There may be translation, syntax, or grammatical errors  If you have any questions, please contact the dictating provider

## 2020-12-20 NOTE — ASSESSMENT & PLAN NOTE
Thiamine/folic acid supplementation on board  [facility] protocol discontinued prior to acute care discharge

## 2020-12-20 NOTE — NURSING NOTE
Patient is cooperative  She reports having anxiety and depression r/t loss of her   She is present in the milieu and engages with others when engaged with  She remains well oriented but forgetful

## 2020-12-20 NOTE — PLAN OF CARE
Problem: Risk for Self Injury/Neglect  Goal: Treatment Goal: Remain safe during length of stay, learn and adopt new coping skills, and be free of self-injurious ideation, impulses and acts at the time of discharge  Outcome: Progressing     Problem: Depression  Goal: Treatment Goal: Demonstrate behavioral control of depressive symptoms, verbalize feelings of improved mood/affect, and adopt new coping skills prior to discharge  Outcome: Progressing  Goal: Verbalize thoughts and feelings  Description: Interventions:  - Assess and re-assess patient's level of risk   - Engage patient in 1:1 interactions, daily, for a minimum of 15 minutes   - Encourage patient to express feelings, fears, frustrations, hopes   Outcome: Progressing  Goal: Refrain from harming self  Description: Interventions:  - Monitor patient closely, per order   - Supervise medication ingestion, monitor effects and side effects   Outcome: Progressing  Goal: Refrain from isolation  Description: Interventions:  - Develop a trusting relationship   - Encourage socialization   Outcome: Progressing  Goal: Refrain from self-neglect  Outcome: Progressing  Goal: Complete daily ADLs, including personal hygiene independently, as able  Description: Interventions:  - Observe, teach, and assist patient with ADLS  -  Monitor and promote a balance of rest/activity, with adequate nutrition and elimination   Outcome: Progressing     Problem: Anxiety  Goal: Anxiety is at manageable level  Description: Interventions:  - Assess and monitor patient's anxiety level  - Monitor for signs and symptoms (heart palpitations, chest pain, shortness of breath, headaches, nausea, feeling jumpy, restlessness, irritable, apprehensive)  - Collaborate with interdisciplinary team and initiate plan and interventions as ordered    - [place] patient to unit/surroundings  - Explain treatment plan  - Encourage participation in care  - Encourage verbalization of concerns/fears  - Identify coping mechanisms  - Assist in developing anxiety-reducing skills  - Administer/offer alternative therapies  - Limit or eliminate stimulants  Outcome: Progressing

## 2020-12-20 NOTE — NURSING NOTE
Pt calm, cooperative, pleasant on approach  Resting in bed this evening  She denies SI currently  Admits to moderate depression  She has an unsteady gait and is an assist X1  Compliant with hs medications and slept all night

## 2020-12-21 PROCEDURE — 99232 SBSQ HOSP IP/OBS MODERATE 35: CPT | Performed by: NURSE PRACTITIONER

## 2020-12-21 RX ORDER — MIRTAZAPINE 15 MG/1
15 TABLET, FILM COATED ORAL
Status: DISCONTINUED | OUTPATIENT
Start: 2020-12-21 | End: 2020-12-23

## 2020-12-21 RX ADMIN — THIAMINE HCL TAB 100 MG 100 MG: 100 TAB at 09:14

## 2020-12-21 RX ADMIN — APIXABAN 2.5 MG: 2.5 TABLET, FILM COATED ORAL at 09:14

## 2020-12-21 RX ADMIN — NYSTATIN 1 APPLICATION: 100000 CREAM TOPICAL at 09:43

## 2020-12-21 RX ADMIN — Medication 1 TABLET: at 09:14

## 2020-12-21 RX ADMIN — MIRTAZAPINE 15 MG: 15 TABLET, FILM COATED ORAL at 22:04

## 2020-12-21 RX ADMIN — NYSTATIN 1 APPLICATION: 100000 CREAM TOPICAL at 17:37

## 2020-12-21 RX ADMIN — OXYCODONE HYDROCHLORIDE AND ACETAMINOPHEN 1000 MG: 500 TABLET ORAL at 09:14

## 2020-12-21 RX ADMIN — FOLIC ACID 1 MG: 1 TABLET ORAL at 09:14

## 2020-12-21 RX ADMIN — Medication 2000 UNITS: at 09:14

## 2020-12-21 RX ADMIN — OXYCODONE HYDROCHLORIDE AND ACETAMINOPHEN 1000 MG: 500 TABLET ORAL at 22:04

## 2020-12-21 RX ADMIN — DONEPEZIL HYDROCHLORIDE 10 MG: 10 TABLET, FILM COATED ORAL at 22:04

## 2020-12-21 RX ADMIN — PANTOPRAZOLE SODIUM 20 MG: 20 TABLET, DELAYED RELEASE ORAL at 06:11

## 2020-12-21 RX ADMIN — APIXABAN 2.5 MG: 2.5 TABLET, FILM COATED ORAL at 17:37

## 2020-12-21 NOTE — PLAN OF CARE
Problem: Risk for Self Injury/Neglect  Goal: Treatment Goal: Remain safe during length of stay, learn and adopt new coping skills, and be free of self-injurious ideation, impulses and acts at the time of discharge  12/21/2020 1815 by Richard Nam RN  Outcome: Progressing  12/21/2020 1815 by Richard Nam RN  Outcome: Progressing     Problem: Depression  Goal: Treatment Goal: Demonstrate behavioral control of depressive symptoms, verbalize feelings of improved mood/affect, and adopt new coping skills prior to discharge  12/21/2020 1815 by Richard Nam RN  Outcome: Progressing  12/21/2020 1815 by Richard Nam RN  Outcome: Progressing  Goal: Verbalize thoughts and feelings  Description: Interventions:  - Assess and re-assess patient's level of risk   - Engage patient in 1:1 interactions, daily, for a minimum of 15 minutes   - Encourage patient to express feelings, fears, frustrations, hopes   12/21/2020 1815 by Richard Nam RN  Outcome: Progressing  12/21/2020 1815 by Richard Nam RN  Outcome: Progressing  Goal: Refrain from harming self  Description: Interventions:  - Monitor patient closely, per order   - Supervise medication ingestion, monitor effects and side effects   12/21/2020 1815 by Richard Nam RN  Outcome: Progressing  12/21/2020 1815 by Richard Nam RN  Outcome: Progressing  Goal: Refrain from isolation  Description: Interventions:  - Develop a trusting relationship   - Encourage socialization   12/21/2020 1815 by Richard Nam RN  Outcome: Progressing  12/21/2020 1815 by Richard Nam RN  Outcome: Progressing  Goal: Refrain from self-neglect  12/21/2020 1815 by Richard Nam RN  Outcome: Progressing  12/21/2020 1815 by Richard Nam RN  Outcome: Progressing  Goal: Attend and participate in unit activities, including therapeutic, recreational, and educational groups  Description: Interventions:  - Provide therapeutic and educational activities daily, encourage attendance and participation, and document same in the medical record   12/21/2020 1815 by Rachelle Tobias RN  Outcome: Progressing  12/21/2020 1815 by Rachelle Tobias RN  Outcome: Progressing  Goal: Complete daily ADLs, including personal hygiene independently, as able  Description: Interventions:  - Observe, teach, and assist patient with ADLS  -  Monitor and promote a balance of rest/activity, with adequate nutrition and elimination   12/21/2020 1815 by Rachelle Tobias RN  Outcome: Progressing  12/21/2020 1815 by Rachelle Tobias RN  Outcome: Progressing     Problem: Anxiety  Goal: Anxiety is at manageable level  Description: Interventions:  - Assess and monitor patient's anxiety level  - Monitor for signs and symptoms (heart palpitations, chest pain, shortness of breath, headaches, nausea, feeling jumpy, restlessness, irritable, apprehensive)  - Collaborate with interdisciplinary team and initiate plan and interventions as ordered    - [place] patient to unit/surroundings  - Explain treatment plan  - Encourage participation in care  - Encourage verbalization of concerns/fears  - Identify coping mechanisms  - Assist in developing anxiety-reducing skills  - Administer/offer alternative therapies  - Limit or eliminate stimulants  12/21/2020 1815 by Rachelle Tobias RN  Outcome: Progressing  12/21/2020 1815 by Rachelle Tobias RN  Outcome: Progressing

## 2020-12-21 NOTE — PROGRESS NOTES
20 0920   Team Meeting   Meeting Type Daily Rounds   Team Members Present   Team Members Present Physician;Nurse;   Physician Team Member [facility]   Nursing Team Member [facility] Team Member Brandee   Patient/Family Present   Patient Present No   Patient's Family Present No     Patient is a new admission, 201, SI to walk into traffic  Patient's   on Saturday from [place]  Patient is depressed and anxious but she is compliant with medications

## 2020-12-21 NOTE — NURSING NOTE
Pt quiet, cooperative withdrawn in room  Visible for meal, ate 100%  Pt responds appropriately to prompts  Scant conversation, resting in room, accepted evening medications  Requires staff assist to get up from bed to use bathroom, appropriate safety awareness  Appears to sleep

## 2020-12-21 NOTE — PLAN OF CARE
Problem: Depression  Goal: Refrain from harming self  Description: Interventions:  - Monitor patient closely, per order   - Supervise medication ingestion, monitor effects and side effects   Outcome: Progressing     Problem: Depression  Goal: Refrain from self-neglect  Outcome: Progressing     Problem: Depression  Goal: Refrain from isolation  Description: Interventions:  - Develop a trusting relationship   - Encourage socialization   Outcome: Not Progressing

## 2020-12-21 NOTE — NURSING NOTE
Patient calm and cooperative on approach  Denies s/s  Patient remains in her room laying down in her bed  Patient out for meals with no interactions with peers  Patient compliant with medications and fair intake for meals  No episodes of SOB noted or reported  Patient is afebrile

## 2020-12-21 NOTE — NURSING NOTE
Pt is calm, cooperative, and pleasant on approach  She reports 3/4 anxiety and 8/10 depression  She denies SI  Compliant with hs medications and is asleep in bed

## 2020-12-21 NOTE — PROGRESS NOTES
Progress Note - Luz Elena BURDEN  38  Stump 78 y o  female MRN: [id number]  Unit/Bed#: Rina Hong 744-91 Encounter: [id number]    The patient was seen for continuing care and reviewed with treatment team   Staff reports the patient rates her current depression as 8/10 with 10 being the worst and anxiety as 3/4 with 4 being the worst  She is cooperative on approach  She says she just wants to feel better and go home  Her back is feeling sore and she is very fatigued  States she is very depressed over losing her  recently  Her sleep has been off and on  She is having a decreased appetite but states that she was never a good eater  She continues to have suicidal thoughts to die and go with her   She does not report any specific plan and just says I don't know  I don't know"       Severe recurrent major depression without psychotic features ([facility] )    Vital signs in last 24 hours:  Temp:  [97 6 °F (36 4 °C)-98 6 °F (37 °C)] 98 6 °F (37 °C)  HR:  [] 92  Resp:  [16-20] 16  BP: (116-162)/(69-77) 162/77    Mental Status Evaluation:    Appearance disheveled   Behavior guarded   Mood anxious and depressed   Speech Soft   Affect mood-congruent   Thought Processes Goal directed and coherent   Thought Content Does not verbalize delusional material   Perceptual Disturbances Denies hallucinations and does not appear to be responding to internal stimuli   Risk Potential Suicidal/Homicidal Ideation - Suicidal Ideations without plan  Risk of Violence - No evidence of risk for violence found on assessment  Risk of Self Mutilation - No evidence of risk for self mutilation found on assessment   Sensorium oriented to person and place   Cognition/Memory short term memory mildly impaired   Consciousness alert and awake   Attention/Concentration attention span and concentration appear shorter than expected for age   Insight limited   Judgement limited   Muscle Strength and Gait/Station Not observed, lying in bed Motor Activity no abnormal movements       Progress Toward Goals:  No change      Recommended Treatment:  Increase Remeron to 15 mg HS Continue with pharmacotherapy, group therapy, milieu therapy and occupational therapy    The patient will be maintained on the following medications:  Current Facility-Administered Medications   Medication Dose Route Frequency Provider Last Rate    acetaminophen  650 mg Oral Q6H PRN Radha Schaefer MD      apixaban  2 5 mg Oral BID Allison Tsai MD      ascorbic acid  1,000 mg Oral Q12H [facility] Steve Lu MD      cholecalciferol  2,000 Units Oral Daily Radha Schaefer MD      donepezil  10 mg Oral HS Radha Schaefer MD      folic acid  1 mg Oral Daily Allison Tsai MD      haloperidol  0 5 mg Oral Q6H PRN Radha Schaefer MD      haloperidol  1 mg Oral Q8H PRN Radha Schaefer MD      haloperidol lactate  2 mg Intramuscular Q6H PRN Radha Schaefer MD      hydrOXYzine HCL  25 mg Oral Q6H PRN Radha Schaefer MD      LORazepam  1 mg Intramuscular Q6H PRN Radha Schaefer MD      LORazepam  1 mg Oral Q6H PRN Radha Schaefer MD      mirtazapine  7 5 mg Oral HS Darby Alexander MD      multivitamin-minerals  1 tablet Oral Daily Radha Schaefer MD      nicotine polacrilex  4 mg Oral Q2H PRN Radha Schaefer MD      nystatin   Topical BID Radha Schaefer MD      pantoprazole  20 mg Oral Early Morning Radha Schaefer MD      thiamine  100 mg Oral Daily Allison Tsai MD      traZODone  50 mg Oral HS PRN Radha Schaefer MD         Severe recurrent major depression without psychotic features ([facility] )

## 2020-12-22 PROCEDURE — 99232 SBSQ HOSP IP/OBS MODERATE 35: CPT | Performed by: NURSE PRACTITIONER

## 2020-12-22 RX ADMIN — NYSTATIN: 100000 CREAM TOPICAL at 17:32

## 2020-12-22 RX ADMIN — THIAMINE HCL TAB 100 MG 100 MG: 100 TAB at 09:27

## 2020-12-22 RX ADMIN — Medication 1 TABLET: at 09:27

## 2020-12-22 RX ADMIN — ACETAMINOPHEN 650 MG: 325 TABLET ORAL at 15:34

## 2020-12-22 RX ADMIN — APIXABAN 2.5 MG: 2.5 TABLET, FILM COATED ORAL at 17:32

## 2020-12-22 RX ADMIN — PANTOPRAZOLE SODIUM 20 MG: 20 TABLET, DELAYED RELEASE ORAL at 05:54

## 2020-12-22 RX ADMIN — OXYCODONE HYDROCHLORIDE AND ACETAMINOPHEN 1000 MG: 500 TABLET ORAL at 09:27

## 2020-12-22 RX ADMIN — OXYCODONE HYDROCHLORIDE AND ACETAMINOPHEN 1000 MG: 500 TABLET ORAL at 21:01

## 2020-12-22 RX ADMIN — DONEPEZIL HYDROCHLORIDE 10 MG: 10 TABLET, FILM COATED ORAL at 21:01

## 2020-12-22 RX ADMIN — MIRTAZAPINE 15 MG: 15 TABLET, FILM COATED ORAL at 21:01

## 2020-12-22 RX ADMIN — Medication 2000 UNITS: at 09:27

## 2020-12-22 RX ADMIN — APIXABAN 2.5 MG: 2.5 TABLET, FILM COATED ORAL at 09:28

## 2020-12-22 RX ADMIN — FOLIC ACID 1 MG: 1 TABLET ORAL at 09:27

## 2020-12-22 NOTE — NURSING NOTE
Patient is visible in dayroom, social with peers  Pt is pleasant and coopertive with care  Pt is medication compliant  Pt won't rate depression or anxiety but states " It's always there , now and again " No distress noted at this time

## 2020-12-22 NOTE — PROGRESS NOTES
Progress Note - Behavioral Health   Danita Jerome 78 y o  female MRN: [id number]  Unit/Bed#: Yves Headings 014-00 Encounter: [id number]    The patient was seen for continuing care and reviewed with treatment team   Staff reports the patient has been quiet and withdrawn  On approach she is lying in her bed  Her major complaint is back soreness and feeling achy  She remains depressed over the recent loss of her  and feels increasingly anxious at times  She said she does not feel right and just wants to go home  She is trying to move around a little bit more but needs staff assistance to get up and out of bed due to feeling weak and achy  Reports she has never been a good sleeper or a "good eater"  Continues with passive suicidal thoughts with no intent or plan      Severe recurrent major depression without psychotic features ([facility] )    Vital signs in last 24 hours:  Temp:  [98 5 °F (36 9 °C)-99 4 °F (37 4 °C)] 99 4 °F (37 4 °C)  HR:  [] 98  Resp:  [15-16] 16  BP: (127-151)/(69-80) 127/77    Mental Status Evaluation:    Appearance Adequate hygiene and grooming   Behavior Less guarded   Mood anxious and depressed   Speech Soft   Affect constricted   Thought Processes Goal directed and coherent   Thought Content Does not verbalize delusional material   Perceptual Disturbances Denies hallucinations and does not appear to be responding to internal stimuli   Risk Potential Suicidal/Homicidal Ideation - Hopeless with death wishes  Risk of Violence - No evidence of risk for violence found on assessment  Risk of Self Mutilation - No evidence of risk for self mutilation found on assessment   Sensorium oriented to person and place   Cognition/Memory recent and remote memory grossly intact   Consciousness alert and awake   Attention/Concentration attention span and concentration appear shorter than expected for age   Insight limited   Judgement limited   Muscle Strength and Gait/Station Not observed, lying in bed Motor Activity no abnormal movements       Progress Toward Goals:  No significant change      Recommended Treatment:  Will plan to increase Remeron tomorrow  Continue with pharmacotherapy, group therapy, milieu therapy and occupational therapy    The patient will be maintained on the following medications:  Current Facility-Administered Medications   Medication Dose Route Frequency Provider Last Rate    acetaminophen  650 mg Oral Q6H PRN Padmini Askew MD      apixaban  2 5 mg Oral BID Glenda Pruett MD      ascorbic acid  1,000 mg Oral Q12H [facility] Steve Taylor MD      cholecalciferol  2,000 Units Oral Daily Padmini Askew MD      donepezil  10 mg Oral HS Padmini Askew MD      folic acid  1 mg Oral Daily Glenda Pruett MD      haloperidol  0 5 mg Oral Q6H PRN Padmini Askew MD      haloperidol  1 mg Oral Q8H PRN Padmini Askew MD      haloperidol lactate  2 mg Intramuscular Q6H PRN Padmini Askew MD      hydrOXYzine HCL  25 mg Oral Q6H PRN Padmini Askew MD      LORazepam  1 mg Intramuscular Q6H PRN Padmini Askew MD      LORazepam  1 mg Oral Q6H PRN Padmini Askew MD      mirtazapine  15 mg Oral HS Veena Jose, CRNP      multivitamin-minerals  1 tablet Oral Daily Padmini Askew MD      nicotine polacrilex  4 mg Oral Q2H PRN Padmini Askew MD      nystatin   Topical BID Padmini Askew MD      pantoprazole  20 mg Oral Early Morning Padmini Askew MD      thiamine  100 mg Oral Daily Glenda Pruett MD      traZODone  50 mg Oral HS PRN Padmini Askew MD         Severe recurrent major depression without psychotic features ([facility] )

## 2020-12-22 NOTE — PROGRESS NOTES
12/22/20 0901   Team Meeting   Meeting Type Daily Rounds   Team Members Present   Team Members Present Physician;Nurse;   Physician Team Member [id number] Team Member [phone] Management Team Member Brandee   Patient/Family Present   Patient Present No   Patient's Family Present No     Patient is compliant with treatment, she is pleasant, depressed at times

## 2020-12-22 NOTE — TREATMENT PLAN
TREATMENT PLAN REVIEW - Yvonne Holden J Stump 78 y o  1940 female MRN: [id number]    [address] Room / Bed: Maribel Cisneros [facility] Encounter: [id number]          Admit Date/Time:  12/19/2020  5:54 PM    Treatment Team: Attending Provider: Ginette Guzman MD; : Murray Forman; Patient Care Assistant: Yaneth Joyce; Patient Care Assistant: Herbert Gorman; Patient Care Assistant: Lucy Campos; Occupational Therapy Assistant: Cherrie Oseguera; Registered Nurse: Luna Hauser; Nurse Manager: Alisia Ramríez RN; Care Coordinator: Dante Castañeda    Diagnosis: Principal Problem:    Depression with suicidal ideation  Active Problems:    COVID-19 virus infection    Suicidal ideation    History of fall    History of alcohol abuse    Cognitive deficits    Anxiety    Anemia of chronic disease      Patient Strengths/Assets: ability for insight    Patient Barriers/Limitations: difficulty adapting    Short Term Goals: decrease in depressive symptoms, decrease in suicidal thoughts    Long Term Goals: improvement in depression, free of suicidal thoughts    Progress Towards Goals: starting psychiatric medications as prescribed    Recommended Treatment: medication management, patient medication education, group therapy, milieu therapy, continued Behavioral Health psychiatric evaluation/assessment process    Treatment Frequency: daily medication monitoring, group and milieu therapy daily, monitoring through interdisciplinary rounds, monitoring through weekly patient care conferences    Expected Discharge Date:   In 7 days    Discharge Plan: discharge to home    Treatment Plan Created/Updated By: Ginette Guzman MD

## 2020-12-22 NOTE — PROGRESS NOTES
12/22/20 1033   Team Meeting   Meeting Type Tx Team Meeting   Initial Conference Date 12/22/20   Next Conference Date 01/22/21   Team Members Present   Team Members Present Physician;Nurse;   Physician Team Member [id number] Team Member [facility] Team Member [place] [phone]   Patient/Family Present   Patient Present No   Patient's Family Present No     Treatment team met without patient due to [facility] isolation  This writer will review treatment plan with patient via phone

## 2020-12-22 NOTE — CASE MANAGEMENT
This writer attempted to complete the treatment plan and assessment for the patient over the phone due to COVID restrictions  Patient requested to complete assessment on a different day   This writer will follow up tomorrow,

## 2020-12-23 PROCEDURE — 99232 SBSQ HOSP IP/OBS MODERATE 35: CPT | Performed by: NURSE PRACTITIONER

## 2020-12-23 PROCEDURE — 93005 ELECTROCARDIOGRAM TRACING: CPT

## 2020-12-23 RX ORDER — MIRTAZAPINE 30 MG/1
30 TABLET, FILM COATED ORAL
Status: DISCONTINUED | OUTPATIENT
Start: 2020-12-23 | End: 2021-01-01

## 2020-12-23 RX ADMIN — OXYCODONE HYDROCHLORIDE AND ACETAMINOPHEN 1000 MG: 500 TABLET ORAL at 21:52

## 2020-12-23 RX ADMIN — Medication 1 TABLET: at 08:48

## 2020-12-23 RX ADMIN — APIXABAN 2.5 MG: 2.5 TABLET, FILM COATED ORAL at 17:32

## 2020-12-23 RX ADMIN — PANTOPRAZOLE SODIUM 20 MG: 20 TABLET, DELAYED RELEASE ORAL at 06:01

## 2020-12-23 RX ADMIN — Medication 2000 UNITS: at 08:48

## 2020-12-23 RX ADMIN — APIXABAN 2.5 MG: 2.5 TABLET, FILM COATED ORAL at 08:48

## 2020-12-23 RX ADMIN — ACETAMINOPHEN 650 MG: 325 TABLET ORAL at 20:30

## 2020-12-23 RX ADMIN — OXYCODONE HYDROCHLORIDE AND ACETAMINOPHEN 1000 MG: 500 TABLET ORAL at 08:48

## 2020-12-23 RX ADMIN — NYSTATIN 1 APPLICATION: 100000 CREAM TOPICAL at 17:33

## 2020-12-23 RX ADMIN — DONEPEZIL HYDROCHLORIDE 10 MG: 10 TABLET, FILM COATED ORAL at 21:53

## 2020-12-23 RX ADMIN — MIRTAZAPINE 30 MG: 30 TABLET, FILM COATED ORAL at 21:52

## 2020-12-23 RX ADMIN — FOLIC ACID 1 MG: 1 TABLET ORAL at 08:48

## 2020-12-23 RX ADMIN — NYSTATIN: 100000 CREAM TOPICAL at 08:50

## 2020-12-23 RX ADMIN — THIAMINE HCL TAB 100 MG 100 MG: 100 TAB at 08:48

## 2020-12-23 NOTE — PLAN OF CARE
Problem: Risk for Self Injury/Neglect  Goal: Treatment Goal: Remain safe during length of stay, learn and adopt new coping skills, and be free of self-injurious ideation, impulses and acts at the time of discharge  Outcome: Progressing     Problem: Anxiety  Goal: Anxiety is at manageable level  Description: Interventions:  - Assess and monitor patient's anxiety level  - Monitor for signs and symptoms (heart palpitations, chest pain, shortness of breath, headaches, nausea, feeling jumpy, restlessness, irritable, apprehensive)  - Collaborate with interdisciplinary team and initiate plan and interventions as ordered    - [place] patient to unit/surroundings  - Explain treatment plan  - Encourage participation in care  - Encourage verbalization of concerns/fears  - Identify coping mechanisms  - Assist in developing anxiety-reducing skills  - Administer/offer alternative therapies  - Limit or eliminate stimulants  Outcome: Progressing

## 2020-12-23 NOTE — NURSING NOTE
Pt is isolative to room and self   Pt refused dinner ,staff made multiple attempt  Pt is medication compliant and does require help from getting in and out of bed  Pt's pulse was elevated slim notified and EKG ordered   Pt denies s/s and currently in bed resting  Will continue to monitor

## 2020-12-23 NOTE — NURSING NOTE
Patient was visible in the milieu watching TV but kept to herself  VSS  Rate anxiety 2 on 4, depression 5 on 10, denies all other s/s  Patient stated that she is depressed because her  just passed away  Patient was comforted  Had snack  Patient was cooperative and compliant with HS medications  Safety checks ongolng

## 2020-12-23 NOTE — NURSING NOTE
Patient calm confuse and cooperative on approach, denies s/s  Patient in and out of common areas with minimal interaction with peers  Patient compliant with medications and meals in the shift  No episodes of SOB reported or noted  Patient is afebrile

## 2020-12-23 NOTE — PLAN OF CARE
Problem: Depression  Goal: Verbalize thoughts and feelings  Description: Interventions:  - Assess and re-assess patient's level of risk   - Engage patient in 1:1 interactions, daily, for a minimum of 15 minutes   - Encourage patient to express feelings, fears, frustrations, hopes   Outcome: Progressing     Problem: Depression  Goal: Refrain from harming self  Description: Interventions:  - Monitor patient closely, per order   - Supervise medication ingestion, monitor effects and side effects   Outcome: Progressing     Problem: Depression  Goal: Refrain from isolation  Description: Interventions:  - Develop a trusting relationship   - Encourage socialization   Outcome: Progressing

## 2020-12-23 NOTE — PLAN OF CARE
Problem: Risk for Self Injury/Neglect  Goal: Treatment Goal: Remain safe during length of stay, learn and adopt new coping skills, and be free of self-injurious ideation, impulses and acts at the time of discharge  Outcome: Progressing     Problem: Depression  Goal: Treatment Goal: Demonstrate behavioral control of depressive symptoms, verbalize feelings of improved mood/affect, and adopt new coping skills prior to discharge  Outcome: Progressing  Goal: Verbalize thoughts and feelings  Description: Interventions:  - Assess and re-assess patient's level of risk   - Engage patient in 1:1 interactions, daily, for a minimum of 15 minutes   - Encourage patient to express feelings, fears, frustrations, hopes   Outcome: Progressing  Goal: Refrain from harming self  Description: Interventions:  - Monitor patient closely, per order   - Supervise medication ingestion, monitor effects and side effects   Outcome: Progressing  Goal: Refrain from isolation  Description: Interventions:  - Develop a trusting relationship   - Encourage socialization   Outcome: Progressing  Goal: Refrain from self-neglect  Outcome: Progressing  Goal: Attend and participate in unit activities, including therapeutic, recreational, and educational groups  Description: Interventions:  - Provide therapeutic and educational activities daily, encourage attendance and participation, and document same in the medical record   Outcome: Progressing  Goal: Complete daily ADLs, including personal hygiene independently, as able  Description: Interventions:  - Observe, teach, and assist patient with ADLS  -  Monitor and promote a balance of rest/activity, with adequate nutrition and elimination   Outcome: Progressing     Problem: Anxiety  Goal: Anxiety is at manageable level  Description: Interventions:  - Assess and monitor patient's anxiety level     - Monitor for signs and symptoms (heart palpitations, chest pain, shortness of breath, headaches, nausea, feeling jumpy, restlessness, irritable, apprehensive)  - Collaborate with interdisciplinary team and initiate plan and interventions as ordered    - [place] patient to unit/surroundings  - Explain treatment plan  - Encourage participation in care  - Encourage verbalization of concerns/fears  - Identify coping mechanisms  - Assist in developing anxiety-reducing skills  - Administer/offer alternative therapies  - Limit or eliminate stimulants  Outcome: Progressing     Problem: DISCHARGE PLANNING  Goal: Discharge to home or other facility with appropriate resources  Description: INTERVENTIONS:  - Identify barriers to discharge w/patient and caregiver  - Arrange for needed discharge resources and transportation as appropriate  - Identify discharge learning needs (meds, wound care, etc )  - Refer to Case Management Department for coordinating discharge planning if the patient needs post-hospital services based on physician/advanced practitioner order or complex needs related to functional status, cognitive ability, or social support system  Outcome: Progressing

## 2020-12-23 NOTE — CASE MANAGEMENT
This writer attempted to complete patient's assessment over the phone, however the patient was resting  This writer spoke with patient's daughter Wai Mora to obtain collateral information  Wai Mora reports her father recently  from [place]  She reports her parents lived in their own home  She repots unfortunately there is significant debt in the house, She reports the house is in "deplorable condition"  She returns her father was a hoarder and her and her siblings are attempting to clean the house while patient is admitted  She reports her parents did not assign a POA and therefore they cannot assist with managing her finances or bills  She requested to speak with the attending doctor regarding patient's capacity  This writer discussed discharge [address] with Wai Del Torosantino  She reports her mother cannot live alone and she cannot live with her children  This writer discussed options for her mother, [address] [facility] services, which would be paid out of pocket or if they are able to obtain assistance through Area of Aging, placement in a [place] [address] or Assisted Living  This writer informed her that patient currently does not meet criteria for placement at SNF  This writer explained to Wai Mora that patient's stay in the hospital is short term and the family should discuss with patient a discharge plan

## 2020-12-23 NOTE — PROGRESS NOTES
12/23/20 0912   Team Meeting   Meeting Type Daily Rounds   Team Members Present   Team Members Present Physician;Nurse;   Physician Team Member [id number] Team Member [facility] Management Team Member Michael     Patient reports depression and anxiety  Patient is withdrawn at times, but she is visible on the unit

## 2020-12-23 NOTE — PROGRESS NOTES
Progress Note - Behavioral Health   Scooter Jerome 78 y o  female MRN: [id number]  Unit/Bed#: Mei Gao 501-31 Encounter: [id number]    The patient was seen for continuing care and reviewed with treatment team   Staff reports the patient remains depressed and withdrawn much of the day but is more visible in the milieu in the evenings  She has been pleasant, cooperative and medication compliant  She is currently lying in her bed  She says she is starting to feel a little bit better physically but remains depressed and anxious  She continues with decreased appetite  Her sleep has been "dozing off an on" day and night  She does continue with passive suicidal thoughts with no intent or plan  She is slightly more future oriented talking about how she has children, grandchildren and a great grandchild to live for      Severe recurrent major depression without psychotic features ([facility] )    Vital signs in last 24 hours:  Temp:  [97 8 °F (36 6 °C)-100 6 °F (38 1 °C)] 97 8 °F (36 6 °C)  HR:  [] 73  Resp:  [16] 16  BP: (110-151)/(63-87) 151/87    Mental Status Evaluation:    Appearance Adequate hygiene and grooming   Behavior cooperative   Mood anxious and depressed   Speech Soft   Affect constricted   Thought Processes Goal directed and coherent   Thought Content Does not verbalize delusional material   Perceptual Disturbances Denies hallucinations and does not appear to be responding to internal stimuli   Risk Potential Suicidal/Homicidal Ideation - No evidence of suicidal or homicidal ideation and Patient does not verbalize suicidal or homicidal ideation  Risk of Violence - No evidence of risk for violence found on assessment  Risk of Self Mutilation - No evidence of risk for self mutilation found on assessment   Sensorium oriented to person, place and time/date   Cognition/Memory recent and remote memory grossly intact   Consciousness alert and awake   Attention/Concentration attention span and concentration appear shorter than expected for age   Insight limited   Judgement limited   Muscle Strength and Gait/Station Not observed, lying in bed   Motor Activity no abnormal movements       Progress Toward Goals:  Mild improvement      Recommended Treatment:  Increase Remeron to 30 mg HS Continue with pharmacotherapy, group therapy, milieu therapy and occupational therapy    The patient will be maintained on the following medications:  Current Facility-Administered Medications   Medication Dose Route Frequency Provider Last Rate    acetaminophen  650 mg Oral Q6H PRN [place], MD      apixaban  2 5 mg Oral BID Chavodaphne Marquez, MD      ascorbic acid  1,000 mg Oral Q12H [facility] Steve Addison MD      cholecalciferol  2,000 Units Oral Daily [place], MD      donepezil  10 mg Oral HS [place], MD      folic acid  1 mg Oral Daily Chavodaphne Marquez, MD      haloperidol  0 5 mg Oral Q6H PRN [place], MD      haloperidol  1 mg Oral Q8H PRN [place], MD      haloperidol lactate  2 mg Intramuscular Q6H PRN [place], MD      hydrOXYzine HCL  25 mg Oral Q6H PRN [place], MD      LORazepam  1 mg Intramuscular Q6H PRN [place] Pod, MD      LORazepam  1 mg Oral Q6H PRN [place], MD      mirtazapine  15 mg Oral HS MARC Chery      multivitamin-minerals  1 tablet Oral Daily [place], MD      nicotine polacrilex  4 mg Oral Q2H PRN [place] Pod, MD      nystatin   Topical BID [place] Pod, MD      pantoprazole  20 mg Oral Early Morning [place], MD      thiamine  100 mg Oral Daily Chavodaphne Marquez, MD      traZODone  50 mg Oral HS PRN [place], MD         Severe recurrent major depression without psychotic features ([facility] )

## 2020-12-24 LAB
ATRIAL RATE: 104 BPM
P AXIS: 36 DEGREES
PR INTERVAL: 152 MS
QRS AXIS: -2 DEGREES
QRSD INTERVAL: 80 MS
QT INTERVAL: 334 MS
QTC INTERVAL: 439 MS
T WAVE AXIS: 33 DEGREES
VENTRICULAR RATE: 104 BPM

## 2020-12-24 PROCEDURE — 99232 SBSQ HOSP IP/OBS MODERATE 35: CPT | Performed by: NURSE PRACTITIONER

## 2020-12-24 PROCEDURE — 93010 ELECTROCARDIOGRAM REPORT: CPT | Performed by: INTERNAL MEDICINE

## 2020-12-24 RX ADMIN — APIXABAN 2.5 MG: 2.5 TABLET, FILM COATED ORAL at 09:34

## 2020-12-24 RX ADMIN — NYSTATIN: 100000 CREAM TOPICAL at 09:40

## 2020-12-24 RX ADMIN — PANTOPRAZOLE SODIUM 20 MG: 20 TABLET, DELAYED RELEASE ORAL at 05:55

## 2020-12-24 RX ADMIN — OXYCODONE HYDROCHLORIDE AND ACETAMINOPHEN 1000 MG: 500 TABLET ORAL at 09:34

## 2020-12-24 RX ADMIN — NYSTATIN: 100000 CREAM TOPICAL at 17:20

## 2020-12-24 RX ADMIN — Medication 1 TABLET: at 09:38

## 2020-12-24 RX ADMIN — DONEPEZIL HYDROCHLORIDE 10 MG: 10 TABLET, FILM COATED ORAL at 21:13

## 2020-12-24 RX ADMIN — MIRTAZAPINE 30 MG: 30 TABLET, FILM COATED ORAL at 21:14

## 2020-12-24 RX ADMIN — Medication 2000 UNITS: at 09:33

## 2020-12-24 RX ADMIN — FOLIC ACID 1 MG: 1 TABLET ORAL at 09:34

## 2020-12-24 RX ADMIN — THIAMINE HCL TAB 100 MG 100 MG: 100 TAB at 09:33

## 2020-12-24 RX ADMIN — OXYCODONE HYDROCHLORIDE AND ACETAMINOPHEN 1000 MG: 500 TABLET ORAL at 21:13

## 2020-12-24 RX ADMIN — APIXABAN 2.5 MG: 2.5 TABLET, FILM COATED ORAL at 17:20

## 2020-12-24 NOTE — NURSING NOTE
Pt blunted with intermittent thoughts to walk into traffic  Good appetite and steady gait  Med-compliant  VSS  Pt visible in dining room and walking in [place] with minimal peer interactions  Compliant with mask  Monitored for safety and support

## 2020-12-24 NOTE — PLAN OF CARE
Problem: Risk for Self Injury/Neglect  Goal: Treatment Goal: Remain safe during length of stay, learn and adopt new coping skills, and be free of self-injurious ideation, impulses and acts at the time of discharge  Outcome: Progressing     Problem: Depression  Goal: Treatment Goal: Demonstrate behavioral control of depressive symptoms, verbalize feelings of improved mood/affect, and adopt new coping skills prior to discharge  Outcome: Progressing  Goal: Verbalize thoughts and feelings  Description: Interventions:  - Assess and re-assess patient's level of risk   - Engage patient in 1:1 interactions, daily, for a minimum of 15 minutes   - Encourage patient to express feelings, fears, frustrations, hopes   Outcome: Progressing  Goal: Refrain from harming self  Description: Interventions:  - Monitor patient closely, per order   - Supervise medication ingestion, monitor effects and side effects   Outcome: Progressing  Goal: Refrain from isolation  Description: Interventions:  - Develop a trusting relationship   - Encourage socialization   Outcome: Progressing  Goal: Refrain from self-neglect  Outcome: Progressing  Goal: Attend and participate in unit activities, including therapeutic, recreational, and educational groups  Description: Interventions:  - Provide therapeutic and educational activities daily, encourage attendance and participation, and document same in the medical record   Outcome: Progressing  Goal: Complete daily ADLs, including personal hygiene independently, as able  Description: Interventions:  - Observe, teach, and assist patient with ADLS  -  Monitor and promote a balance of rest/activity, with adequate nutrition and elimination   Outcome: Progressing     Problem: Anxiety  Goal: Anxiety is at manageable level  Description: Interventions:  - Assess and monitor patient's anxiety level     - Monitor for signs and symptoms (heart palpitations, chest pain, shortness of breath, headaches, nausea, feeling jumpy, restlessness, irritable, apprehensive)  - Collaborate with interdisciplinary team and initiate plan and interventions as ordered    - [place] patient to unit/surroundings  - Explain treatment plan  - Encourage participation in care  - Encourage verbalization of concerns/fears  - Identify coping mechanisms  - Assist in developing anxiety-reducing skills  - Administer/offer alternative therapies  - Limit or eliminate stimulants  Outcome: Progressing     Problem: DISCHARGE PLANNING  Goal: Discharge to home or other facility with appropriate resources  Description: INTERVENTIONS:  - Identify barriers to discharge w/patient and caregiver  - Arrange for needed discharge resources and transportation as appropriate  - Identify discharge learning needs (meds, wound care, etc )  - Refer to Case Management Department for coordinating discharge planning if the patient needs post-hospital services based on physician/advanced practitioner order or complex needs related to functional status, cognitive ability, or social support system  Outcome: Progressing

## 2020-12-24 NOTE — PROGRESS NOTES
Progress Note - Behavioral Health   Thomas Jerome 78 y o  female MRN: [id number]  Unit/Bed#: [facility] 797-79 Encounter: [id number]    The patient was seen for continuing care and reviewed with treatment team   Staff reports the patient has been isolative to her room been refusing meals times  She has been medication compliant  This morning she is more visible in the milieu as well as more talkative and engaged with interview  She remains depressed and anxious  Her appetite and sleep remain decreased but she states she was never a good eater or sleeper"  She continues with suicidal thoughts  She said her family is upset with her because she keeps telling them she just wants to die to be with her   She says she does not want to go home to an empty house and could easily walk into the busy street to kill herself  She remains fatigued and somewhat unsteady on her feet but refuses a walker because she says she would rather fall and get it over with"       Severe recurrent major depression without psychotic features ([facility] [address] )    Vital signs in last 24 hours:  Temp:  [98 °F (36 7 °C)-99 5 °F (37 5 °C)] 98 °F (36 7 °C)  HR:  [] 105  Resp:  [16-20] 16  BP: ()/(59-75) 99/63    Mental Status Evaluation:    Appearance Adequate hygiene and grooming and Good eye contact   Behavior calm and cooperative   Mood anxious and depressed   Speech Soft   Affect constricted   Thought Processes Goal directed and coherent   Thought Content Does not verbalize delusional material   Perceptual Disturbances Denies hallucinations and does not appear to be responding to internal stimuli   Risk Potential Suicidal/Homicidal Ideation - Suicidal Ideations with plan to walk into traffic  Risk of Violence - No evidence of risk for violence found on assessment  Risk of Self Mutilation - No evidence of risk for self mutilation found on assessment   Sensorium oriented to person, place and time/date   Cognition/Memory recent and remote memory grossly intact   Consciousness alert and awake   Attention/Concentration attention span and concentration are age appropriate   Insight poor   Judgement poor   Muscle Strength and Gait/Station slow gait   Motor Activity no abnormal movements       Progress Toward Goals:  Less seclusive      Recommended Treatment:  Continue Remeron 30 mg HS Continue with pharmacotherapy, group therapy, milieu therapy and occupational therapy    The patient will be maintained on the following medications:  Current Facility-Administered Medications   Medication Dose Route Frequency Provider Last Rate    acetaminophen  650 mg Oral Q6H PRN Shaista Denny MD      apixaban  2 5 mg Oral BID Miriam Carrasquillo MD      ascorbic acid  1,000 mg Oral Q12H [address] Steve Malik MD      cholecalciferol  2,000 Units Oral Daily Shaista Denny MD      donepezil  10 mg Oral HS Shaista Denny MD      folic acid  1 mg Oral Daily Miriam Carrasquillo MD      haloperidol  0 5 mg Oral Q6H PRN Shaista Denny MD      haloperidol  1 mg Oral Q8H PRN Shaista Denny MD      haloperidol lactate  2 mg Intramuscular Q6H PRN Shaista Denny MD      hydrOXYzine HCL  25 mg Oral Q6H PRN Shaista Denny MD      LORazepam  1 mg Intramuscular Q6H PRN Shaista Denny MD      LORazepam  1 mg Oral Q6H PRN Shaista Denny MD      mirtazapine  30 mg Oral HS MARC Moore      multivitamin-minerals  1 tablet Oral Daily Shaista Denny MD      nicotine polacrilex  4 mg Oral Q2H PRN Shaista Denny MD      nystatin   Topical BID Shaista Denny MD      pantoprazole  20 mg Oral Early Morning Shaista Denny MD      thiamine  100 mg Oral Daily Miriam Carrasquillo MD      traZODone  50 mg Oral HS PRN Shaista Denny MD         Severe recurrent major depression without psychotic features ([facility] [address] )

## 2020-12-25 PROCEDURE — 99232 SBSQ HOSP IP/OBS MODERATE 35: CPT | Performed by: PHYSICIAN ASSISTANT

## 2020-12-25 RX ORDER — MAGNESIUM HYDROXIDE/ALUMINUM HYDROXICE/SIMETHICONE 120; 1200; 1200 MG/30ML; MG/30ML; MG/30ML
30 SUSPENSION ORAL EVERY 4 HOURS PRN
Status: DISCONTINUED | OUTPATIENT
Start: 2020-12-25 | End: 2020-12-27

## 2020-12-25 RX ADMIN — FOLIC ACID 1 MG: 1 TABLET ORAL at 09:29

## 2020-12-25 RX ADMIN — ALUMINUM HYDROXIDE, MAGNESIUM HYDROXIDE, AND SIMETHICONE 30 ML: 200; 200; 20 SUSPENSION ORAL at 16:41

## 2020-12-25 RX ADMIN — THIAMINE HCL TAB 100 MG 100 MG: 100 TAB at 09:29

## 2020-12-25 RX ADMIN — NYSTATIN: 100000 CREAM TOPICAL at 09:29

## 2020-12-25 RX ADMIN — ACETAMINOPHEN 650 MG: 325 TABLET ORAL at 10:28

## 2020-12-25 RX ADMIN — Medication 1 TABLET: at 09:29

## 2020-12-25 RX ADMIN — APIXABAN 2.5 MG: 2.5 TABLET, FILM COATED ORAL at 09:29

## 2020-12-25 RX ADMIN — NYSTATIN: 100000 CREAM TOPICAL at 17:26

## 2020-12-25 RX ADMIN — OXYCODONE HYDROCHLORIDE AND ACETAMINOPHEN 1000 MG: 500 TABLET ORAL at 21:08

## 2020-12-25 RX ADMIN — PANTOPRAZOLE SODIUM 20 MG: 20 TABLET, DELAYED RELEASE ORAL at 06:10

## 2020-12-25 RX ADMIN — DONEPEZIL HYDROCHLORIDE 10 MG: 10 TABLET, FILM COATED ORAL at 21:08

## 2020-12-25 RX ADMIN — MIRTAZAPINE 30 MG: 30 TABLET, FILM COATED ORAL at 21:08

## 2020-12-25 RX ADMIN — APIXABAN 2.5 MG: 2.5 TABLET, FILM COATED ORAL at 17:20

## 2020-12-25 RX ADMIN — Medication 2000 UNITS: at 09:29

## 2020-12-25 RX ADMIN — OXYCODONE HYDROCHLORIDE AND ACETAMINOPHEN 1000 MG: 500 TABLET ORAL at 09:29

## 2020-12-25 NOTE — NURSING NOTE
Patient c/o being "sore and achy"  Patient c/o neck and back pain  Patient states that she is not "able to get around like I use to" and that this makes her anxious"  States she also has anxiety r/t being alone on [place] especially following the recent death of her   She rates her anxiety 3/4  She rates her depression 5/10  She reports passive SI, "I'd rather be where my  is"

## 2020-12-25 NOTE — PROGRESS NOTES
Progress Note - [address] J Stump 78 y o  female MRN: [id number]  Unit/Bed#: Jose C Munoz 314-45 Encounter: [id number]    Patient seen, chart reviewed and discussed with staff  Nursing staff notes she has been compliant with her medications  More visible in the milieu today  No aggressive or agitated behavior  Patient is calm and cooperative on approach  Describes her moods as up and down   Continues with depression and death wish  Denies suicidal ideation or intention though  States that this time of year is especially difficult due to [place] and the recent death of her spouse  States that she would rather be with him  She has been compliant with medications and denies any adverse effects  Physically she reports chronic neck and back pain  No other physical complaints or concerns      Behavior over the last 24 hours:  unchanged  Sleep: normal  Appetite: normal  Medication side effects: No  ROS: As noted above  /61 (BP Location: Right arm)   Pulse 91   Temp 98 5 °F (36 9 °C) (Tympanic)   Resp 18   Ht 5' 6" (1 676 m)   Wt 53 kg (116 lb 13 5 oz)   LMP  (LMP Unknown)   SpO2 96%   BMI 18 86 kg/m²     Medications:   Current Facility-Administered Medications   Medication Dose Route Frequency    acetaminophen (TYLENOL) tablet 650 mg  650 mg Oral Q6H PRN    apixaban (ELIQUIS) tablet 2 5 mg  2 5 mg Oral BID    ascorbic acid (VITAMIN C) tablet 1,000 mg  1,000 mg Oral Q12H [address]    cholecalciferol (VITAMIN D3) tablet 2,000 Units  2,000 Units Oral Daily    donepezil (ARICEPT) tablet 10 mg  10 mg Oral HS    folic acid (FOLVITE) tablet 1 mg  1 mg Oral Daily    haloperidol (HALDOL) tablet 0 5 mg  0 5 mg Oral Q6H PRN    haloperidol (HALDOL) tablet 1 mg  1 mg Oral Q8H PRN    haloperidol lactate (HALDOL) injection 2 mg  2 mg Intramuscular Q6H PRN    hydrOXYzine HCL (ATARAX) tablet 25 mg  25 mg Oral Q6H PRN    LORazepam (ATIVAN) injection 1 mg  1 mg Intramuscular Q6H PRN    LORazepam (ATIVAN) tablet 1 mg  1 mg Oral Q6H PRN    mirtazapine (REMERON) tablet 30 mg  30 mg Oral HS    multivitamin-minerals (CENTRUM) tablet 1 tablet  1 tablet Oral Daily    nicotine polacrilex (NICORETTE) gum 4 mg  4 mg Oral Q2H PRN    nystatin (MYCOSTATIN) cream   Topical BID    pantoprazole (PROTONIX) EC tablet 20 mg  20 mg Oral Early Morning    thiamine (VITAMIN B1) tablet 100 mg  100 mg Oral Daily    traZODone (DESYREL) tablet 50 mg  50 mg Oral HS PRN       Labs:   Admission on 12/19/2020   Component Date Value Ref Range Status    Ventricular Rate 12/23/2020 104  BPM Final    Atrial Rate 12/23/2020 104  BPM Final    TX Interval 12/23/2020 152  ms Final    QRSD Interval 12/23/2020 80  ms Final    QT Interval 12/23/2020 334  ms Final    QTC Interval 12/23/2020 439  ms Final    P Axis 12/23/2020 36  degrees Final    QRS Axis 12/23/2020 -2  degrees Final    T Wave Axis 12/23/2020 33  degrees Final       Mental Status Evaluation:  Appearance:  age appropriate and casually dressed   Behavior:  Calm, cooperative   Speech:  soft   Mood:  anxious and depressed   Affect:  mood-congruent   Thought Process:  goal directed and logical   Thought Content:  normal   Perceptual Disturbances: None   Risk Potential: Suicidal Ideations without plan, Homicidal Ideations none and Potential for Aggression No   Sensorium:  person, place and time/date   Cognition:  recent and remote memory grossly intact   Consciousness:  alert and awake    Attention: attention span and concentration were age appropriate   Insight:  limited   Judgment: limited   Gait/Station: normal gait/station   Motor Activity: no abnormal movements     Progress Toward Goals:  Minimal change    Assessment/Plan   Principal Problem:    Depression with suicidal ideation  Active Problems:    COVID-19 virus infection    Suicidal ideation    History of fall    History of alcohol abuse    Cognitive deficits    Anxiety    Anemia of chronic disease      Recommended Treatment:  Continue medications follows  Remeron 30 mg HS  Aricept 10 mg HS      Continue with group therapy, milieu therapy and occupational therapy  Risks, benefits and possible side effects of Medications:   Risks, benefits, and possible side effects of medications explained to patient and patient verbalizes understanding  Counseling / Coordination of Care  Total floor / unit time spent today 25 minutes  Greater than 50% of total time was spent with the patient and / or family counseling and / or coordination of care   A description of the counseling / coordination of care:  Medication management, chart review, patient interview

## 2020-12-25 NOTE — PLAN OF CARE
Problem: Risk for Self Injury/Neglect  Goal: Treatment Goal: Remain safe during length of stay, learn and adopt new coping skills, and be free of self-injurious ideation, impulses and acts at the time of discharge  Outcome: Progressing     Problem: Depression  Goal: Treatment Goal: Demonstrate behavioral control of depressive symptoms, verbalize feelings of improved mood/affect, and adopt new coping skills prior to discharge  Outcome: Progressing  Goal: Verbalize thoughts and feelings  Description: Interventions:  - Assess and re-assess patient's level of risk   - Engage patient in 1:1 interactions, daily, for a minimum of 15 minutes   - Encourage patient to express feelings, fears, frustrations, hopes   Outcome: Progressing  Goal: Refrain from harming self  Description: Interventions:  - Monitor patient closely, per order   - Supervise medication ingestion, monitor effects and side effects   Outcome: Progressing  Goal: Refrain from isolation  Description: Interventions:  - Develop a trusting relationship   - Encourage socialization   Outcome: Progressing  Goal: Refrain from self-neglect  Outcome: Progressing  Goal: Complete daily ADLs, including personal hygiene independently, as able  Description: Interventions:  - Observe, teach, and assist patient with ADLS  -  Monitor and promote a balance of rest/activity, with adequate nutrition and elimination   Outcome: Progressing     Problem: Anxiety  Goal: Anxiety is at manageable level  Description: Interventions:  - Assess and monitor patient's anxiety level  - Monitor for signs and symptoms (heart palpitations, chest pain, shortness of breath, headaches, nausea, feeling jumpy, restlessness, irritable, apprehensive)  - Collaborate with interdisciplinary team and initiate plan and interventions as ordered    - [place] patient to unit/surroundings  - Explain treatment plan  - Encourage participation in care  - Encourage verbalization of concerns/fears  - Identify coping mechanisms  - Assist in developing anxiety-reducing skills  - Administer/offer alternative therapies  - Limit or eliminate stimulants  Outcome: Progressing

## 2020-12-25 NOTE — NURSING NOTE
Pt is calm and cooperative with care   Pt is visible on unit and walking in the hallway at times  Pt is medication compliant and 100% at dinner time  No symptoms reported at this time  Will continue to monitor

## 2020-12-26 PROCEDURE — 99232 SBSQ HOSP IP/OBS MODERATE 35: CPT | Performed by: PSYCHIATRY & NEUROLOGY

## 2020-12-26 RX ADMIN — FOLIC ACID 1 MG: 1 TABLET ORAL at 08:49

## 2020-12-26 RX ADMIN — APIXABAN 2.5 MG: 2.5 TABLET, FILM COATED ORAL at 17:16

## 2020-12-26 RX ADMIN — NYSTATIN: 100000 CREAM TOPICAL at 08:50

## 2020-12-26 RX ADMIN — APIXABAN 2.5 MG: 2.5 TABLET, FILM COATED ORAL at 08:49

## 2020-12-26 RX ADMIN — THIAMINE HCL TAB 100 MG 100 MG: 100 TAB at 08:49

## 2020-12-26 RX ADMIN — Medication 1 TABLET: at 08:49

## 2020-12-26 RX ADMIN — PANTOPRAZOLE SODIUM 20 MG: 20 TABLET, DELAYED RELEASE ORAL at 06:28

## 2020-12-26 RX ADMIN — Medication 2000 UNITS: at 08:49

## 2020-12-26 RX ADMIN — DONEPEZIL HYDROCHLORIDE 10 MG: 10 TABLET, FILM COATED ORAL at 21:03

## 2020-12-26 RX ADMIN — MIRTAZAPINE 30 MG: 30 TABLET, FILM COATED ORAL at 21:03

## 2020-12-26 RX ADMIN — ACETAMINOPHEN 650 MG: 325 TABLET ORAL at 17:15

## 2020-12-26 RX ADMIN — OXYCODONE HYDROCHLORIDE AND ACETAMINOPHEN 1000 MG: 500 TABLET ORAL at 08:49

## 2020-12-26 RX ADMIN — ALUMINUM HYDROXIDE, MAGNESIUM HYDROXIDE, AND SIMETHICONE 30 ML: 200; 200; 20 SUSPENSION ORAL at 21:03

## 2020-12-26 NOTE — NURSING NOTE
Patient c/o symptoms of IBS and requested PRN medication she had PRN Mylanta which she reports was effective

## 2020-12-26 NOTE — NURSING NOTE
Pt out in the dayroom watching television  She is calm and pleasant on approach  Pt reports moderate depression and passive SI that comes and goes, she has no plan, contracts for safety on the unit  Compliant with hs medication

## 2020-12-26 NOTE — PROGRESS NOTES
Progress Note - Behavioral Health     Danita Jerome 78 y o  female MRN: [id number]   Unit/Bed#: Yves Headings 794-21 Encounter: [id number]      Subjective:  Patient's chart reviewed and case discussed with the nurse  The patient was seen in her room  The patient presented very tired and seemingly depressed  She reports she is not doing well  Reports missing her  who passed away recently  She endorses suicidal ideation but feels safe here  Contracts for safety here  Reports no plan or intent to hurt herself here but could not contract for safety post discharge  Reports her appetite is okay  Reports sleep has never been good  Denies any auditory or visual hallucination  Does not endorse any other concern  As per the nurse the patient had been endorsing passive suicidal ideation  At times forgetful  Had been in milieu at times but mostly is lying in her bed  Has been compliant with her medication  No behavior issues reported        ROS: all other systems are negative, Chronic back and neck pain    Mental Status Evaluation:    Appearance:  casually dressed   Behavior:  calm   Speech:  slow, soft   Mood:  depressed   Affect:  constricted   Thought Process:  organized   Associations: intact associations   Thought Content:  normal   Perceptual Disturbances: none   Risk Potential: Suicidal ideation - Yes, passive death wish  Homicidal ideation - None  Potential for aggression - No   Sensorium:  oriented to person, place and time/date   Memory:  recent and remote memory grossly intact   Consciousness:  alert and awake   Attention: attention span and concentration are age appropriate   Insight:  fair   Judgment: fair   Gait/Station: in bed   Motor Activity: no abnormal movements     Vital signs in last 24 hours:    Temp:  [97 8 °F (36 6 °C)-98 °F (36 7 °C)] 97 9 °F (36 6 °C)  HR:  [] 103  Resp:  [16-18] 16  BP: (102-144)/(50-83) 142/83    Laboratory results:   I have personally reviewed all pertinent laboratory/tests results  Most Recent Labs:   Lab Results   Component Value Date    WBC 6 38 12/17/2020    RBC 3 63 (L) 12/17/2020    HGB 10 4 (L) 12/17/2020    HCT 31 4 (L) 12/17/2020     12/17/2020    RDW 14 6 12/17/2020    NEUTROABS 4 37 12/17/2020    SODIUM 140 12/18/2020    K 4 5 12/18/2020     12/18/2020    CO2 26 12/18/2020    BUN 12 12/18/2020    CREATININE 0 72 12/18/2020    GLUC 82 12/18/2020    GLUF 86 08/03/2020    CALCIUM 8 3 12/18/2020    AST 15 12/14/2020    ALT 12 12/14/2020    ALKPHOS 81 12/14/2020    TP 6 2 (L) 12/14/2020    ALB 2 3 (L) 12/14/2020    TBILI 0 47 12/14/2020    CHOLESTEROL 210 (H) 08/03/2020    HDL 57 08/03/2020    TRIG 122 08/03/2020    LDLCALC 129 (H) 08/03/2020    [place] 153 08/03/2020    [id number] 3 830 (H) 12/14/2020       Progress Toward Goals: improving slowly    Assessment/Plan  patient continues to be depressed with passive suicidal thoughts in the setting of recently passing away of her   Feels safe here and contracted for safety  Plan is to continue with her current medication which includes mirtazapine 30 mg at bedtime for depression, poor sleep and poor appetite  Will continue monitor her for her mood, behavior, safety, sleep and response to meds  Will continue to also provide patient supportive psychotherapy  Principal Problem:    Depression with suicidal ideation  Active Problems:    COVID-19 virus infection    Suicidal ideation    History of fall    History of alcohol abuse    Cognitive deficits    Anxiety    Anemia of chronic disease    Recommended Treatment:     Planned medication and treatment changes:     All current active medications have been reviewed  Encourage group therapy, milieu therapy and occupational therapy  Behavioral Health checks every 7 minutes  Continue current medications:  Current Facility-Administered Medications   Medication Dose Route Frequency Provider Last Rate    acetaminophen  650 mg Oral Q6H PRN Lacey Sutherland MD Latanya      aluminum-magnesium hydroxide-simethicone  30 mL Oral Q4H PRN Margarette Herring PA-C      apixaban  2 5 mg Oral BID Glenda Pruett MD      cholecalciferol  2,000 Units Oral Daily Padmini Askew MD      donepezil  10 mg Oral HS Padmini Askew MD      folic acid  1 mg Oral Daily Glenda Pruett MD      haloperidol  0 5 mg Oral Q6H PRN Padmini Askew MD      haloperidol  1 mg Oral Q8H PRN Padmini Askew MD      haloperidol lactate  2 mg Intramuscular Q6H PRN Padmini Askew MD      hydrOXYzine HCL  25 mg Oral Q6H PRN Padmini Askew MD      LORazepam  1 mg Intramuscular Q6H PRN Padmini Askew MD      LORazepam  1 mg Oral Q6H PRN Padmini Askew MD      mirtazapine  30 mg Oral HS Veena Jose, CRNP      multivitamin-minerals  1 tablet Oral Daily Padmini Askew MD      nicotine polacrilex  4 mg Oral Q2H PRN Padmini Askew MD      nystatin   Topical BID Padmini Askew MD      pantoprazole  20 mg Oral Early Morning Padmini Askew MD      thiamine  100 mg Oral Daily Glenda Pruett MD      traZODone  50 mg Oral HS PRN Padmini Askew MD         Risks / Benefits of Treatment:    Risks, benefits, and possible side effects of medications explained to patient and patient verbalizes understanding and agreement for treatment  Counseling / Coordination of Care:    Patient's progress discussed with staff in treatment team meeting  Medications, treatment progress and treatment plan reviewed with patient      Wilmer Alves MD 12/26/20

## 2020-12-26 NOTE — PLAN OF CARE
Problem: Risk for Self Injury/Neglect  Goal: Treatment Goal: Remain safe during length of stay, learn and adopt new coping skills, and be free of self-injurious ideation, impulses and acts at the time of discharge  Outcome: Progressing     Problem: Depression  Goal: Treatment Goal: Demonstrate behavioral control of depressive symptoms, verbalize feelings of improved mood/affect, and adopt new coping skills prior to discharge  Outcome: Progressing  Goal: Verbalize thoughts and feelings  Description: Interventions:  - Assess and re-assess patient's level of risk   - Engage patient in 1:1 interactions, daily, for a minimum of 15 minutes   - Encourage patient to express feelings, fears, frustrations, hopes   Outcome: Progressing  Goal: Refrain from harming self  Description: Interventions:  - Monitor patient closely, per order   - Supervise medication ingestion, monitor effects and side effects   Outcome: Progressing  Goal: Refrain from isolation  Description: Interventions:  - Develop a trusting relationship   - Encourage socialization   Outcome: Progressing  Goal: Refrain from self-neglect  Outcome: Progressing  Goal: Complete daily ADLs, including personal hygiene independently, as able  Description: Interventions:  - Observe, teach, and assist patient with ADLS  -  Monitor and promote a balance of rest/activity, with adequate nutrition and elimination   Outcome: Progressing     Problem: Anxiety  Goal: Anxiety is at manageable level  Description: Interventions:  - Assess and monitor patient's anxiety level  - Monitor for signs and symptoms (heart palpitations, chest pain, shortness of breath, headaches, nausea, feeling jumpy, restlessness, irritable, apprehensive)  - Collaborate with interdisciplinary team and initiate plan and interventions as ordered    - [place] patient to unit/surroundings  - Explain treatment plan  - Encourage participation in care  - Encourage verbalization of concerns/fears  - Identify coping mechanisms  - Assist in developing anxiety-reducing skills  - Administer/offer alternative therapies  - Limit or eliminate stimulants  Outcome: Progressing

## 2020-12-27 PROBLEM — K58.0 IRRITABLE BOWEL SYNDROME WITH DIARRHEA: Status: ACTIVE | Noted: 2020-12-27

## 2020-12-27 PROCEDURE — 99232 SBSQ HOSP IP/OBS MODERATE 35: CPT | Performed by: PSYCHIATRY & NEUROLOGY

## 2020-12-27 PROCEDURE — 99232 SBSQ HOSP IP/OBS MODERATE 35: CPT | Performed by: INTERNAL MEDICINE

## 2020-12-27 RX ORDER — DICYCLOMINE HCL 20 MG
20 TABLET ORAL
Status: DISCONTINUED | OUTPATIENT
Start: 2020-12-27 | End: 2021-02-01 | Stop reason: HOSPADM

## 2020-12-27 RX ADMIN — Medication 1 TABLET: at 08:20

## 2020-12-27 RX ADMIN — FOLIC ACID 1 MG: 1 TABLET ORAL at 08:20

## 2020-12-27 RX ADMIN — DONEPEZIL HYDROCHLORIDE 10 MG: 10 TABLET, FILM COATED ORAL at 21:16

## 2020-12-27 RX ADMIN — Medication 2000 UNITS: at 08:20

## 2020-12-27 RX ADMIN — APIXABAN 2.5 MG: 2.5 TABLET, FILM COATED ORAL at 17:17

## 2020-12-27 RX ADMIN — THIAMINE HCL TAB 100 MG 100 MG: 100 TAB at 08:20

## 2020-12-27 RX ADMIN — PANTOPRAZOLE SODIUM 20 MG: 20 TABLET, DELAYED RELEASE ORAL at 06:28

## 2020-12-27 RX ADMIN — ALUMINUM HYDROXIDE, MAGNESIUM HYDROXIDE, AND SIMETHICONE 30 ML: 200; 200; 20 SUSPENSION ORAL at 11:18

## 2020-12-27 RX ADMIN — NYSTATIN: 100000 CREAM TOPICAL at 17:17

## 2020-12-27 RX ADMIN — ALUMINUM HYDROXIDE, MAGNESIUM HYDROXIDE, AND SIMETHICONE 30 ML: 200; 200; 20 SUSPENSION ORAL at 16:36

## 2020-12-27 RX ADMIN — DICYCLOMINE HYDROCHLORIDE 20 MG: 20 TABLET ORAL at 21:16

## 2020-12-27 RX ADMIN — NYSTATIN: 100000 CREAM TOPICAL at 08:21

## 2020-12-27 RX ADMIN — APIXABAN 2.5 MG: 2.5 TABLET, FILM COATED ORAL at 08:20

## 2020-12-27 RX ADMIN — MIRTAZAPINE 30 MG: 30 TABLET, FILM COATED ORAL at 21:16

## 2020-12-27 RX ADMIN — DICYCLOMINE HYDROCHLORIDE 20 MG: 20 TABLET ORAL at 17:43

## 2020-12-27 RX ADMIN — ACETAMINOPHEN 650 MG: 325 TABLET ORAL at 14:02

## 2020-12-27 NOTE — NURSING NOTE
Patient reports PRN Mylanta was ineffective  Patient has been calm and cooperative  Affect brightens with approach

## 2020-12-27 NOTE — PROGRESS NOTES
Progress Note - Kisha Courts Stump 1940, 78 y o  female MRN: [id number]    Unit/Bed#: Tisha Smalls 101-55 Encounter: [id number]    Primary Care Provider: Sugar [place],    Date and time admitted to hospital: 2020  5:54 PM        Irritable bowel syndrome with diarrhea  Assessment & Plan  Patient with history of IBS in the past   Patient not on any other medication  Patient says Pepto-Bismol helps with this  I physically saw pts stool is more formed than soft, no blood  Patient denies any history of hemorrhoids  Monitor clinically  , monitor for signs of bleeding  · Will start Pepto-Bismol          VTE Pharmacologic Prophylaxis:   Pharmacologic: Apixaban (Eliquis)  Mechanical VTE Prophylaxis in Place: Yes    Patient Centered Rounds: I have performed bedside rounds with nursing staff today  Education and Discussions with Family / Patient: patient     Time Spent for Care: 20 minutes  More than 50% of total time spent on counseling and coordination of care as described above  Current Length of Stay: 8 day(s)    Current Patient Status: Inpatient Psych     Code Status: Level 1 - Full Code      Subjective:   Patient seen and examined  no distress, saying shes having "diarrhea" due to a banana she had today  Objective:     Vitals:   Temp (24hrs), Av 6 °F (36 4 °C), Min:97 °F (36 1 °C), Max:97 8 °F (36 6 °C)    Temp:  [97 °F (36 1 °C)-97 8 °F (36 6 °C)] 97 °F (36 1 °C)  HR:  [] 80  Resp:  [15-18] 18  BP: (115-157)/(67-83) 157/74  SpO2:  [95 %-100 %] 100 %  Body mass index is 18 86 kg/m²  Input and Output Summary (last 24 hours): Intake/Output Summary (Last 24 hours) at 2020 1725  Last data filed at 2020 1721  Gross per 24 hour   Intake 1020 ml   Output    Net 1020 ml       Physical Exam:     Physical Exam  Constitutional:       Appearance: Normal appearance  Comments: cachetic appearing    HENT:      Head: Normocephalic        Mouth/Throat:      Mouth: Mucous membranes are moist    Abdominal:      General: Abdomen is flat  There is no distension  Palpations: Abdomen is soft  There is no mass  Tenderness: There is no abdominal tenderness  There is no guarding  Hernia: No hernia is present  Neurological:      General: No focal deficit present  Mental Status: She is alert and oriented to person, place, and time  Additional Data:     Labs:                                  * I Have Reviewed All Lab Data Listed Above  * Additional Pertinent Lab Tests Reviewed:  All Labs Within Last 24 Hours Reviewed    Imaging:    Imaging Reports Reviewed Today Include: none    Recent Cultures (last 7 days):           Last 24 Hours Medication List:   Current Facility-Administered Medications   Medication Dose Route Frequency Provider Last Rate    acetaminophen  650 mg Oral Q6H PRN Iraj Krishna MD      apixaban  2 5 mg Oral BID Sandeep Zeng MD      bismuth subsalicylate  120 mg Oral Q6H PRN Michelle Rand MD      cholecalciferol  2,000 Units Oral Daily Iraj Krishna MD      dicyclomine  20 mg Oral 4x Daily (AC & HS) Michelle Rand MD      donepezil  10 mg Oral HS Iraj Krishna MD      folic acid  1 mg Oral Daily Sandeep Zeng MD      haloperidol  0 5 mg Oral Q6H PRN Iraj Krishna MD      haloperidol  1 mg Oral Q8H PRN Iraj Krishna MD      haloperidol lactate  2 mg Intramuscular Q6H PRN Iraj Krishna MD      hydrOXYzine HCL  25 mg Oral Q6H PRN Iraj Krishna MD      LORazepam  1 mg Intramuscular Q6H PRN Iraj Krishna MD      LORazepam  1 mg Oral Q6H PRN Iraj Krishna MD      mirtazapine  30 mg Oral HS Searcy Delay, CRNP      multivitamin-minerals  1 tablet Oral Daily Iraj Krishna MD      nicotine polacrilex  4 mg Oral Q2H PRN Iraj Krishna MD      nystatin   Topical BID Iraj Krishna MD      pantoprazole  20 mg Oral Early Morning Iraj Krishna MD      thiamine  100 mg Oral Daily Sandeep Zeng, MD Sean Andrade traZODone  50 mg Oral HS PRN Gemma Wilson MD          Today, Patient Was Seen By: Mariam Craft MD    ** Please Note: Dictation voice to text software may have been used in the creation of this document   **

## 2020-12-27 NOTE — ASSESSMENT & PLAN NOTE
Patient with history of IBS in the past   Patient not on any other medication  Patient says Pepto-Bismol helps with this  I physically saw pts stool is more formed than soft, no blood  Patient denies any history of hemorrhoids  Monitor clinically  , monitor for signs of bleeding    · Will start Pepto-Bismol

## 2020-12-27 NOTE — NURSING NOTE
Patient received PRN Mylanta for indigestion, also c/o bowel incontinence and spotting of blood on toilet paper

## 2020-12-27 NOTE — NURSING NOTE
Patient c/o indigestion she attributes to symptoms of IBS, patient requested and was given PRN mylanta

## 2020-12-27 NOTE — PROGRESS NOTES
Progress Note - Behavioral Health     Marysol Precious Jerome 78 y o  female MRN: [id number]   Unit/Bed#: Wolfgang Payment 137-15 Encounter: [id number]      Subjective:  Patient's chart reviewed and case discussed with the nurse  The patient was seen in group room  The patient appeared brighter today  Patient reports she currently has diarrhea and does not feel good physically but reports depression is slightly better today  She though reports still missing her  and is grieving about it  Denies any suicidal thoughts today  Contracts for safety here  Reports her appetite is okay  Reports sleep was okay last night but stated it has never been good even when she was not depressed  Denies any auditory or visual hallucination  Does not endorse any other concern  As per the nurse the patient had been endorse passive suicidal ideation yesterday  Had been in milieu at times and took shower today  Has been compliant with her medication  No behavior issues reported        ROS: all other systems are negative, Chronic back and neck pain    Mental Status Evaluation:    Appearance:  casually dressed   Behavior:  calm   Speech:  slow, soft   Mood:  depressed   Affect:  Brighter today   Thought Process:  organized   Associations: intact associations   Thought Content:  normal   Perceptual Disturbances: none   Risk Potential: Suicidal ideation -none today  Homicidal ideation - None  Potential for aggression - No   Sensorium:  oriented to person, place and time/date   Memory:  recent and remote memory grossly intact   Consciousness:  alert and awake   Attention: attention span and concentration are age appropriate   Insight:  fair   Judgment: fair   Gait/Station: Normal   Motor Activity: no abnormal movements     Vital signs in last 24 hours:    Temp:  [97 7 °F (36 5 °C)-97 8 °F (36 6 °C)] 97 7 °F (36 5 °C)  HR:  [] 84  Resp:  [15-18] 18  BP: (111-135)/(55-83) 132/83    Laboratory results:   I have personally reviewed all pertinent laboratory/tests results  Most Recent Labs:   Lab Results   Component Value Date    WBC 6 38 12/17/2020    RBC 3 63 (L) 12/17/2020    HGB 10 4 (L) 12/17/2020    HCT 31 4 (L) 12/17/2020     12/17/2020    RDW 14 6 12/17/2020    NEUTROABS 4 37 12/17/2020    SODIUM 140 12/18/2020    K 4 5 12/18/2020     12/18/2020    CO2 26 12/18/2020    BUN 12 12/18/2020    CREATININE 0 72 12/18/2020    GLUC 82 12/18/2020    GLUF 86 08/03/2020    CALCIUM 8 3 12/18/2020    AST 15 12/14/2020    ALT 12 12/14/2020    ALKPHOS 81 12/14/2020    TP 6 2 (L) 12/14/2020    ALB 2 3 (L) 12/14/2020    TBILI 0 47 12/14/2020    CHOLESTEROL 210 (H) 08/03/2020    HDL 57 08/03/2020    TRIG 122 08/03/2020    LDLCALC 129 (H) 08/03/2020    [place] 153 08/03/2020    RLJ1FOXQXPLO 3 830 (H) 12/14/2020       Progress Toward Goals: improving slowly    Assessment/Plan  seems to be brighter today and denying any SI  Feels safe here and contracted for safety  Plan is to continue with her current medication which includes mirtazapine 30 mg at bedtime for depression, poor sleep and poor appetite  Will continue monitor her for her mood, behavior, safety, sleep and response to meds  Will continue to also provide patient supportive psychotherapy  Principal Problem:    Depression with suicidal ideation  Active Problems:    COVID-19 virus infection    Suicidal ideation    History of fall    History of alcohol abuse    Cognitive deficits    Anxiety    Anemia of chronic disease    Recommended Treatment:     Planned medication and treatment changes:     All current active medications have been reviewed  Encourage group therapy, milieu therapy and occupational therapy  Behavioral Health checks every 7 minutes  Continue current medications:  Current Facility-Administered Medications   Medication Dose Route Frequency Provider Last Rate    acetaminophen  650 mg Oral Q6H PRN Jacobo Ortega MD      aluminum-magnesium hydroxide-simethicone  30 mL Oral Q4H PRN Katharine Ibarra PA-C      apixaban  2 5 mg Oral BID Jhony Campbell MD      cholecalciferol  2,000 Units Oral Daily Huma Maki MD      donepezil  10 mg Oral HS Huma Maki MD      folic acid  1 mg Oral Daily Jhony Campbell MD      haloperidol  0 5 mg Oral Q6H PRN Huma Maki MD      haloperidol  1 mg Oral Q8H PRN Huma Maki MD      haloperidol lactate  2 mg Intramuscular Q6H PRN Huma Maki MD      hydrOXYzine HCL  25 mg Oral Q6H PRN Huma Maki MD      LORazepam  1 mg Intramuscular Q6H PRN Huma Maki MD      LORazepam  1 mg Oral Q6H PRN Huma Maki MD      mirtazapine  30 mg Oral HS Haze Beans, CRNP      multivitamin-minerals  1 tablet Oral Daily Huma Maki MD      nicotine polacrilex  4 mg Oral Q2H PRN Huma Maki MD      nystatin   Topical BID Huma Maki MD      pantoprazole  20 mg Oral Early Morning Huma Maki MD      thiamine  100 mg Oral Daily Jhony Campbell MD      traZODone  50 mg Oral HS PRN Huma Maki MD         Risks / Benefits of Treatment:    Risks, benefits, and possible side effects of medications explained to patient and patient verbalizes understanding and agreement for treatment  Counseling / Coordination of Care:    Patient's progress discussed with staff in treatment team meeting  Medications, treatment progress and treatment plan reviewed with patient      Emily Chase MD 12/27/20

## 2020-12-27 NOTE — NURSING NOTE
Reports from [facility] that patient was expressing passive SI, making statements about wanting to be dead

## 2020-12-28 PROCEDURE — 99232 SBSQ HOSP IP/OBS MODERATE 35: CPT | Performed by: NURSE PRACTITIONER

## 2020-12-28 RX ADMIN — PANTOPRAZOLE SODIUM 20 MG: 20 TABLET, DELAYED RELEASE ORAL at 06:39

## 2020-12-28 RX ADMIN — DICYCLOMINE HYDROCHLORIDE 20 MG: 20 TABLET ORAL at 21:10

## 2020-12-28 RX ADMIN — THIAMINE HCL TAB 100 MG 100 MG: 100 TAB at 08:50

## 2020-12-28 RX ADMIN — DICYCLOMINE HYDROCHLORIDE 20 MG: 20 TABLET ORAL at 06:39

## 2020-12-28 RX ADMIN — DICYCLOMINE HYDROCHLORIDE 20 MG: 20 TABLET ORAL at 17:48

## 2020-12-28 RX ADMIN — FOLIC ACID 1 MG: 1 TABLET ORAL at 08:50

## 2020-12-28 RX ADMIN — APIXABAN 2.5 MG: 2.5 TABLET, FILM COATED ORAL at 17:48

## 2020-12-28 RX ADMIN — MIRTAZAPINE 30 MG: 30 TABLET, FILM COATED ORAL at 21:10

## 2020-12-28 RX ADMIN — APIXABAN 2.5 MG: 2.5 TABLET, FILM COATED ORAL at 08:50

## 2020-12-28 RX ADMIN — DONEPEZIL HYDROCHLORIDE 10 MG: 10 TABLET, FILM COATED ORAL at 21:10

## 2020-12-28 RX ADMIN — Medication 2000 UNITS: at 08:50

## 2020-12-28 RX ADMIN — Medication 1 TABLET: at 08:50

## 2020-12-28 RX ADMIN — DICYCLOMINE HYDROCHLORIDE 20 MG: 20 TABLET ORAL at 11:47

## 2020-12-28 NOTE — PLAN OF CARE
Problem: Risk for Self Injury/Neglect  Goal: Treatment Goal: Remain safe during length of stay, learn and adopt new coping skills, and be free of self-injurious ideation, impulses and acts at the time of discharge  Outcome: Progressing     Problem: Depression  Goal: Treatment Goal: Demonstrate behavioral control of depressive symptoms, verbalize feelings of improved mood/affect, and adopt new coping skills prior to discharge  Outcome: Progressing  Goal: Verbalize thoughts and feelings  Description: Interventions:  - Assess and re-assess patient's level of risk   - Engage patient in 1:1 interactions, daily, for a minimum of 15 minutes   - Encourage patient to express feelings, fears, frustrations, hopes   Outcome: Progressing  Goal: Refrain from harming self  Description: Interventions:  - Monitor patient closely, per order   - Supervise medication ingestion, monitor effects and side effects   Outcome: Progressing  Goal: Refrain from isolation  Description: Interventions:  - Develop a trusting relationship   - Encourage socialization   Outcome: Progressing  Goal: Refrain from self-neglect  Outcome: Progressing  Goal: Complete daily ADLs, including personal hygiene independently, as able  Description: Interventions:  - Observe, teach, and assist patient with ADLS  -  Monitor and promote a balance of rest/activity, with adequate nutrition and elimination   Outcome: Progressing     Problem: Anxiety  Goal: Anxiety is at manageable level  Description: Interventions:  - Assess and monitor patient's anxiety level  - Monitor for signs and symptoms (heart palpitations, chest pain, shortness of breath, headaches, nausea, feeling jumpy, restlessness, irritable, apprehensive)  - Collaborate with interdisciplinary team and initiate plan and interventions as ordered    - [place] patient to unit/surroundings  - Explain treatment plan  - Encourage participation in care  - Encourage verbalization of concerns/fears  - Identify coping mechanisms  - Assist in developing anxiety-reducing skills  - Administer/offer alternative therapies  - Limit or eliminate stimulants  Outcome: Progressing

## 2020-12-28 NOTE — NURSING NOTE
Patient was confused, discussed that she was leaving after she finished watching her show  She was effectively reoriented

## 2020-12-28 NOTE — PROGRESS NOTES
Progress Note - Behavioral Health   Destiney Galindo Stump 78 y o  female MRN: [id number]  Unit/Bed#: Neymar Don 746-13 Encounter: [id number]    The patient was seen for continuing care and reviewed with treatment team   Staff reports the patient has been more alert and continues with forgetfulness at times  Last night she was confused and disoriented and thought she was leaving the unit after watching her show  This morning she is lying in bed  She remains depressed and anxious "it comes and goes"  She said she is not having suicidal thoughts as often not too much any more" but continues with passive death wishes  She is very worried about going home after discharge and says she will need to have "company all day"  She feels very dependent on other people because she cannot drive or go anywhere by herself  Continues to report poor sleep and poor appetite      Severe recurrent major depression without psychotic features ([facility] [address] )    Vital signs in last 24 hours:  Temp:  [97 °F (36 1 °C)-97 7 °F (36 5 °C)] 97 7 °F (36 5 °C)  HR:  [] 103  Resp:  [15-18] 16  BP: (120-157)/(67-83) 120/71    Mental Status Evaluation:    Appearance Good eye contact   Behavior calm and cooperative   Mood anxious and depressed   Speech Normal rate and volume   Affect constricted   Thought Processes Goal directed and coherent   Thought Content Does not verbalize delusional material   Perceptual Disturbances Denies hallucinations and does not appear to be responding to internal stimuli   Risk Potential Suicidal/Homicidal Ideation - Hopeless with death wishes  Risk of Violence - No evidence of risk for violence found on assessment  Risk of Self Mutilation - No evidence of risk for self mutilation found on assessment   Sensorium oriented to person, place and time/date   Cognition/Memory short term memory impaired   Consciousness alert and awake   Attention/Concentration attention span and concentration appear shorter than expected for age   Insight limited   Judgement limited   Muscle Strength and Gait/Station In bed   Motor Activity no abnormal movements       Progress Toward Goals:  Some improvement      Recommended Treatment: Continue with pharmacotherapy, group therapy, milieu therapy and occupational therapy    The patient will be maintained on the following medications:  Current Facility-Administered Medications   Medication Dose Route Frequency Provider Last Rate    acetaminophen  650 mg Oral Q6H PRN Amaury Dumont MD      apixaban  2 5 mg Oral BID Michelet Tran MD      bismuth subsalicylate  884 mg Oral Q6H PRN Raphael Pinto MD      cholecalciferol  2,000 Units Oral Daily Amaury Dumont MD      dicyclomine  20 mg Oral 4x Daily (AC & HS) Raphael Pinto MD      donepezil  10 mg Oral HS Amaury Dumont, MD      folic acid  1 mg Oral Daily Michelet Tran MD      haloperidol  0 5 mg Oral Q6H PRN Amaury Dumont, MD      haloperidol  1 mg Oral Q8H PRN Amaury Dumont, MD      haloperidol lactate  2 mg Intramuscular Q6H PRN Amaury Dumont, MD      hydrOXYzine HCL  25 mg Oral Q6H PRN Amaury Matter, MD      LORazepam  1 mg Intramuscular Q6H PRN Amaury Dumont, MD      LORazepam  1 mg Oral Q6H PRN Amaury Dumont, MD      mirtazapine  30 mg Oral HS MARC Lawler      multivitamin-minerals  1 tablet Oral Daily Amaury Dumont, MD      nicotine polacrilex  4 mg Oral Q2H PRN Amaury Dumont, MD      nystatin   Topical BID Amaury Dumont MD      pantoprazole  20 mg Oral Early Morning Amaury Dumont, MD      thiamine  100 mg Oral Daily Michelet Tran MD      traZODone  50 mg Oral HS PRN Amaury Dumont, MD         Severe recurrent major depression without psychotic features ([facility] [address] )

## 2020-12-28 NOTE — TREATMENT TEAM
12/25/20 0900   Team Meeting   Meeting Type Daily Rounds   Team Members Present   Team Members Present Physician;Nurse   Physician Team Member Eloisa Hernandez   Nursing Team Member Jessenia Kasper     Patient is calm and cooperative  Generally well oriented but forgetful  Experiencing severe depression following the loss of her

## 2020-12-28 NOTE — NURSING NOTE
Patient has been irritable this morning  She has been more withdrawn to her room  She does not express any specific needs

## 2020-12-28 NOTE — PROGRESS NOTES
12/28/20 0959   Team Meeting   Meeting Type Daily Rounds   Team Members Present   Team Members Present Physician;Nurse;   Physician Team Member [id number] Team Member [facility] Management Team Member Brandee   Patient/Family Present   Patient Present No   Patient's Family Present No     Patient has been confused over the last 24 hrs  Patient continue to make threats of suicide by walking in traffic  Patient is compliant with medications  This writer will discuss discharge plan for patient with her daughter

## 2020-12-28 NOTE — PLAN OF CARE
Problem: Risk for Self Injury/Neglect  Goal: Treatment Goal: Remain safe during length of stay, learn and adopt new coping skills, and be free of self-injurious ideation, impulses and acts at the time of discharge  Outcome: Progressing     Problem: Depression  Goal: Treatment Goal: Demonstrate behavioral control of depressive symptoms, verbalize feelings of improved mood/affect, and adopt new coping skills prior to discharge  Outcome: Progressing  Goal: Verbalize thoughts and feelings  Description: Interventions:  - Assess and re-assess patient's level of risk   - Engage patient in 1:1 interactions, daily, for a minimum of 15 minutes   - Encourage patient to express feelings, fears, frustrations, hopes   Outcome: Progressing  Goal: Refrain from harming self  Description: Interventions:  - Monitor patient closely, per order   - Supervise medication ingestion, monitor effects and side effects   Outcome: Progressing  Goal: Refrain from isolation  Description: Interventions:  - Develop a trusting relationship   - Encourage socialization   Outcome: Progressing  Goal: Refrain from self-neglect  Outcome: Progressing  Goal: Complete daily ADLs, including personal hygiene independently, as able  Description: Interventions:  - Observe, teach, and assist patient with ADLS  -  Monitor and promote a balance of rest/activity, with adequate nutrition and elimination   Outcome: Progressing     Problem: Anxiety  Goal: Anxiety is at manageable level  Description: Interventions:  - Assess and monitor patient's anxiety level  - Monitor for signs and symptoms (heart palpitations, chest pain, shortness of breath, headaches, nausea, feeling jumpy, restlessness, irritable, apprehensive)  - Collaborate with interdisciplinary team and initiate plan and interventions as ordered    - [place] patient to unit/surroundings  - Explain treatment plan  - Encourage participation in care  - Encourage verbalization of concerns/fears  - Identify coping mechanisms  - Assist in developing anxiety-reducing skills  - Administer/offer alternative therapies  - Limit or eliminate stimulants  Outcome: Progressing

## 2020-12-29 PROCEDURE — 99232 SBSQ HOSP IP/OBS MODERATE 35: CPT | Performed by: NURSE PRACTITIONER

## 2020-12-29 RX ORDER — LOPERAMIDE HYDROCHLORIDE 2 MG/1
2 CAPSULE ORAL 4 TIMES DAILY PRN
Status: DISCONTINUED | OUTPATIENT
Start: 2020-12-29 | End: 2021-02-01 | Stop reason: HOSPADM

## 2020-12-29 RX ADMIN — Medication 1 TABLET: at 08:44

## 2020-12-29 RX ADMIN — THIAMINE HCL TAB 100 MG 100 MG: 100 TAB at 08:44

## 2020-12-29 RX ADMIN — Medication 2000 UNITS: at 08:44

## 2020-12-29 RX ADMIN — MIRTAZAPINE 30 MG: 30 TABLET, FILM COATED ORAL at 21:03

## 2020-12-29 RX ADMIN — DONEPEZIL HYDROCHLORIDE 10 MG: 10 TABLET, FILM COATED ORAL at 21:03

## 2020-12-29 RX ADMIN — BISMUTH SUBSALICYLATE 524 MG: 262 LIQUID ORAL at 16:34

## 2020-12-29 RX ADMIN — BISMUTH SUBSALICYLATE 524 MG: 262 LIQUID ORAL at 09:51

## 2020-12-29 RX ADMIN — FOLIC ACID 1 MG: 1 TABLET ORAL at 08:44

## 2020-12-29 RX ADMIN — DICYCLOMINE HYDROCHLORIDE 20 MG: 20 TABLET ORAL at 06:20

## 2020-12-29 RX ADMIN — DICYCLOMINE HYDROCHLORIDE 20 MG: 20 TABLET ORAL at 16:33

## 2020-12-29 RX ADMIN — DICYCLOMINE HYDROCHLORIDE 20 MG: 20 TABLET ORAL at 21:03

## 2020-12-29 RX ADMIN — NYSTATIN: 100000 CREAM TOPICAL at 08:47

## 2020-12-29 RX ADMIN — DICYCLOMINE HYDROCHLORIDE 20 MG: 20 TABLET ORAL at 11:59

## 2020-12-29 RX ADMIN — APIXABAN 2.5 MG: 2.5 TABLET, FILM COATED ORAL at 17:14

## 2020-12-29 RX ADMIN — APIXABAN 2.5 MG: 2.5 TABLET, FILM COATED ORAL at 08:44

## 2020-12-29 RX ADMIN — PANTOPRAZOLE SODIUM 20 MG: 20 TABLET, DELAYED RELEASE ORAL at 06:20

## 2020-12-29 NOTE — PROGRESS NOTES
12/29/20 1044   Team Meeting   Meeting Type Daily Rounds   Initial Conference Date 12/29/20   Next Conference Date 12/30/20   Team Members Present   Team Members Present Physician;;Nurse;   Physician Team Member Ashleigh Jameson / Dianne Ross   Nursing Team Member [address] Management Team Member [facility]   Social Work Team Member Jasmyne Rand   Patient/Family Present   Patient Present No   Patient's Family Present No   Passive SI, irritable, visible, guarded

## 2020-12-29 NOTE — PROGRESS NOTES
Progress Note - Behavioral Health   Earney Osgood Stump 78 y o  female MRN: [id number]  Unit/Bed#: Sun Lagunas 457-83 Encounter: [id number]    The patient was seen for continuing care and reviewed with treatment team   Staff reports the patient has been irritable and withdrawn at times  Today she is visible in the milieu  She is pleasant and cooperative on approach  She said she is still depressed because she just lost her  a week ago  States she continues to have suicidal thoughts off and on and that I always was like that"  She continues with decreased appetite and sleep but says she has always been this way  Her major complaint this morning is that she is having diarrhea and feels embarrassed that she needs staff assistance to clean her up and shower      Severe recurrent major depression without psychotic features ([facility] )    Vital signs in last 24 hours:  Temp:  [97 °F (36 1 °C)-98 4 °F (36 9 °C)] 97 5 °F (36 4 °C)  HR:  [] 83  Resp:  [15-18] 16  BP: (121-158)/(59-76) 158/76    Mental Status Evaluation:    Appearance Good eye contact   Behavior calm and cooperative   Mood depressed   Speech Normal rate and volume   Affect blunted   Thought Processes Goal directed and coherent   Thought Content Does not verbalize delusional material   Perceptual Disturbances Denies hallucinations and does not appear to be responding to internal stimuli   Risk Potential Suicidal/Homicidal Ideation - Suicidal Ideations without plan  Risk of Violence - No evidence of risk for violence found on assessment  Risk of Self Mutilation - No evidence of risk for self mutilation found on assessment   Sensorium oriented to person, place and time/date   Cognition/Memory short term memory mildly impaired   Consciousness alert and awake   Attention/Concentration attention span and concentration are age appropriate   Insight limited   Judgement limited   Muscle Strength and Gait/Station Not observed, in a chair   Motor Activity no abnormal movements       Progress Toward Goals:  Mild improvement      Recommended Treatment: Continue with pharmacotherapy, group therapy, milieu therapy and occupational therapy    The patient will be maintained on the following medications:  Current Facility-Administered Medications   Medication Dose Route Frequency Provider Last Rate    acetaminophen  650 mg Oral Q6H PRN Shaista Denny MD      apixaban  2 5 mg Oral BID Miriam Carrasquillo MD      bismuth subsalicylate  346 mg Oral Q6H PRN Sondra Rob MD      cholecalciferol  2,000 Units Oral Daily Shaista Denny MD      dicyclomine  20 mg Oral 4x Daily (AC & HS) Sodnra Rob MD      donepezil  10 mg Oral HS Shaista Denny MD      folic acid  1 mg Oral Daily Miriam Carrasquillo MD      haloperidol  0 5 mg Oral Q6H PRN Shaista Denny MD      haloperidol  1 mg Oral Q8H PRN Shaista Denny MD      haloperidol lactate  2 mg Intramuscular Q6H PRN Shaista Denny MD      hydrOXYzine HCL  25 mg Oral Q6H PRN Shaista Denny MD      LORazepam  1 mg Intramuscular Q6H PRN Shaista Denny MD      LORazepam  1 mg Oral Q6H PRN Shaista Denny MD      mirtazapine  30 mg Oral HS MARC Moore      multivitamin-minerals  1 tablet Oral Daily Shaista Denny MD      nicotine polacrilex  4 mg Oral Q2H PRN Shaista Denny MD      nystatin   Topical BID Shaista Denny MD      pantoprazole  20 mg Oral Early Morning Shaista Denny MD      thiamine  100 mg Oral Daily Miriam Carrasquillo MD      traZODone  50 mg Oral HS PRN Shaista Denny MD         Severe recurrent major depression without psychotic features ([facility] [address] )

## 2020-12-29 NOTE — NURSING NOTE
Patient is visible on unit , pleasant on approach and social with staff and peers  Pt states depression " It's up and down, always like that "  "Anxiety just a little " Pt is calm and cooperative with care  Pt medication compliant

## 2020-12-29 NOTE — NURSING NOTE
Patient was visible in the milieu watching tv but kept to herself  VSS  Patient stated she is depressed and that she has the right to be depressed because it is [place] and her  just passed away, denies all other s/s  Had snack  Patient was cooperative and compliant with HS medications  Safety checks ongoing

## 2020-12-30 PROCEDURE — 99232 SBSQ HOSP IP/OBS MODERATE 35: CPT | Performed by: NURSE PRACTITIONER

## 2020-12-30 RX ADMIN — DICYCLOMINE HYDROCHLORIDE 20 MG: 20 TABLET ORAL at 16:11

## 2020-12-30 RX ADMIN — Medication 1 TABLET: at 08:22

## 2020-12-30 RX ADMIN — APIXABAN 2.5 MG: 2.5 TABLET, FILM COATED ORAL at 16:11

## 2020-12-30 RX ADMIN — PANTOPRAZOLE SODIUM 20 MG: 20 TABLET, DELAYED RELEASE ORAL at 06:11

## 2020-12-30 RX ADMIN — APIXABAN 2.5 MG: 2.5 TABLET, FILM COATED ORAL at 08:22

## 2020-12-30 RX ADMIN — NYSTATIN 1 APPLICATION: 100000 CREAM TOPICAL at 08:26

## 2020-12-30 RX ADMIN — MIRTAZAPINE 30 MG: 30 TABLET, FILM COATED ORAL at 21:28

## 2020-12-30 RX ADMIN — DICYCLOMINE HYDROCHLORIDE 20 MG: 20 TABLET ORAL at 21:28

## 2020-12-30 RX ADMIN — Medication 2000 UNITS: at 08:22

## 2020-12-30 RX ADMIN — DICYCLOMINE HYDROCHLORIDE 20 MG: 20 TABLET ORAL at 06:11

## 2020-12-30 RX ADMIN — FOLIC ACID 1 MG: 1 TABLET ORAL at 08:22

## 2020-12-30 RX ADMIN — DONEPEZIL HYDROCHLORIDE 10 MG: 10 TABLET, FILM COATED ORAL at 21:28

## 2020-12-30 RX ADMIN — DICYCLOMINE HYDROCHLORIDE 20 MG: 20 TABLET ORAL at 11:37

## 2020-12-30 RX ADMIN — THIAMINE HCL TAB 100 MG 100 MG: 100 TAB at 08:22

## 2020-12-30 NOTE — NURSING NOTE
Patient confuse calm and cooperative on approach, denies s/s  Patient in and out of common areas with minimal interaction with peers  Patient compliant with medications and meals in the shift  No episodes of SOB reported or noted  Patient is afebrile  Patient will liam transfer to 6B unit at 1330

## 2020-12-30 NOTE — PROGRESS NOTES
Progress Note - Behavioral Health   Eula Tapia Stump 78 y o  female MRN: [id number]  Unit/Bed#: Rina Hong 141-62 Encounter: [id number]    The patient was seen for continuing care and reviewed with treatment team   Staff reports the patient has been more visible in the milieu and more interactive with staff  She has been cooperative and medication compliant  Her diarrhea from yesterday has now resolved  On approach she is pleasant and cooperative  She feels depressed at times related to missing her  and worried about going home alone  She said she is eating as much as she usually does and has never been a big eater  Reports sleeping surprisingly well last night  Continues with passive suicidal thoughts at times related to loneliness and wanting to be with her   She was engaged and cooperative with SLUMs evaluation and scored 11/30 with deficits in all areas      Severe recurrent major depression without psychotic features ([facility] [address] )    Vital signs in last 24 hours:  Temp:  [97 1 °F (36 2 °C)-97 7 °F (36 5 °C)] 97 6 °F (36 4 °C)  HR:  [] 107  Resp:  [16-18] 16  BP: (121-151)/(61-83) 121/73    Mental Status Evaluation:    Appearance Adequate hygiene and grooming and Good eye contact   Behavior calm and cooperative   Mood depressed   Speech Normal rate and volume   Affect blunted   Thought Processes Goal directed and coherent   Thought Content Does not verbalize delusional material   Perceptual Disturbances Denies hallucinations and does not appear to be responding to internal stimuli   Risk Potential Suicidal/Homicidal Ideation - passive suicidal thoughts that come and go  Risk of Violence - No evidence of risk for violence found on assessment  Risk of Self Mutilation - No evidence of risk for self mutilation found on assessment   Sensorium oriented to person and place   Cognition/Memory short term memory impaired   Consciousness alert and awake   Attention/Concentration attention span and concentration appear shorter than expected for age   Insight limited   Judgement limited   Muscle Strength and Gait/Station slow gait   Motor Activity no abnormal movements       Progress Toward Goals:  Progressing      Recommended Treatment: Continue with pharmacotherapy, group therapy, milieu therapy and occupational therapy    The patient will be maintained on the following medications:  Current Facility-Administered Medications   Medication Dose Route Frequency Provider Last Rate    acetaminophen  650 mg Oral Q6H PRN Jcarlos Parham MD      apixaban  2 5 mg Oral BID Nadia Ramirez MD      bismuth subsalicylate  010 mg Oral Q6H PRN Adam Alexander MD      cholecalciferol  2,000 Units Oral Daily Jcarlos Parham MD      dicyclomine  20 mg Oral 4x Daily (AC & HS) Adam Alexander MD      donepezil  10 mg Oral HS Jcarlos Parham MD      folic acid  1 mg Oral Daily Nadia Ramirez MD      haloperidol  0 5 mg Oral Q6H PRN Jcarlos Parham MD      haloperidol  1 mg Oral Q8H PRN Jcarlos Parham MD      haloperidol lactate  2 mg Intramuscular Q6H PRN Jcarlos Parham MD      hydrOXYzine HCL  25 mg Oral Q6H PRN Jcarlos Parham MD      loperamide  2 mg Oral 4x Daily PRN Marion Steinberg MD      LORazepam  1 mg Intramuscular Q6H PRN Jcarlos Parham MD      LORazepam  1 mg Oral Q6H PRN Jcarlos Parham MD      mirtazapine  30 mg Oral HS MARC Sarabia      multivitamin-minerals  1 tablet Oral Daily Jcarlos Parham MD      nicotine polacrilex  4 mg Oral Q2H PRN Jcarlos Parham MD      nystatin   Topical BID Jcarlos Parham MD      pantoprazole  20 mg Oral Early Morning Jcarlos Parham MD      thiamine  100 mg Oral Daily Nadia Ramirez MD      traZODone  50 mg Oral HS PRN Jcarlos Parham MD         Severe recurrent major depression without psychotic features ([facility] [address] )

## 2020-12-30 NOTE — CASE MANAGEMENT
This writer spoke with patient's daughter Anupama Schaefer and discussed patient's SLUM result  This writer informed her that patient should not live alone based on the assessment  Anupama Schaefer stated that patient cannot live with family because patient cannot be alone for any time period  She reports when her fathher was alive, the patient was "attached to his hip"  She reports her father was admitted to the hospital with [place] and her mother went to the hospital with him and refused to leave because she did not want to be alone  She reports she had her mother stay with her for a few a few days and when she left for a few hours to run errands her mother called her multiple times, she called her siblings and other relatives asking the,m to come and stay with her  She reports they do not have the financial ability to place her mother in an Assisted Living or [place]  She agreed to start the process to be placed [facility]  This writer will complete MA 51 and PASSR  Anupama Schaefer also requested to speak with treating psychiatrist to discuss her mother's capacity to determine if she can assign a POA, because they did not assign one before her father passed

## 2020-12-30 NOTE — NURSING NOTE
Patient was visible in the milieu watching tv but kept to herself  VSS  Rate anxiety 2 on 4, depression 4 on 10, denies all other s/s  No c/o loose stool this shift  Had snack  Patient was cooperative and compliant with HS medications  Safety checks ongoing

## 2020-12-31 PROCEDURE — 99232 SBSQ HOSP IP/OBS MODERATE 35: CPT | Performed by: NURSE PRACTITIONER

## 2020-12-31 RX ADMIN — THIAMINE HCL TAB 100 MG 100 MG: 100 TAB at 09:03

## 2020-12-31 RX ADMIN — Medication 1 TABLET: at 09:03

## 2020-12-31 RX ADMIN — DICYCLOMINE HYDROCHLORIDE 20 MG: 20 TABLET ORAL at 17:08

## 2020-12-31 RX ADMIN — NYSTATIN: 100000 CREAM TOPICAL at 09:03

## 2020-12-31 RX ADMIN — DICYCLOMINE HYDROCHLORIDE 20 MG: 20 TABLET ORAL at 11:54

## 2020-12-31 RX ADMIN — MIRTAZAPINE 30 MG: 30 TABLET, FILM COATED ORAL at 21:26

## 2020-12-31 RX ADMIN — APIXABAN 2.5 MG: 2.5 TABLET, FILM COATED ORAL at 09:03

## 2020-12-31 RX ADMIN — FOLIC ACID 1 MG: 1 TABLET ORAL at 09:03

## 2020-12-31 RX ADMIN — DONEPEZIL HYDROCHLORIDE 10 MG: 10 TABLET, FILM COATED ORAL at 21:26

## 2020-12-31 RX ADMIN — DICYCLOMINE HYDROCHLORIDE 20 MG: 20 TABLET ORAL at 06:01

## 2020-12-31 RX ADMIN — PANTOPRAZOLE SODIUM 20 MG: 20 TABLET, DELAYED RELEASE ORAL at 05:59

## 2020-12-31 RX ADMIN — APIXABAN 2.5 MG: 2.5 TABLET, FILM COATED ORAL at 17:08

## 2020-12-31 RX ADMIN — Medication 2000 UNITS: at 09:03

## 2020-12-31 RX ADMIN — DICYCLOMINE HYDROCHLORIDE 20 MG: 20 TABLET ORAL at 21:26

## 2020-12-31 NOTE — NURSING NOTE
Pt observed in room and in bed  Pt is calm and cooperative on approach  Bright affect noted   Pt med compliant and reported no other concerns

## 2020-12-31 NOTE — NURSING NOTE
Patient visible on the unit, social with staff and select peers when approached  Patient states she feels depression and anxiety but denies to rate  Per patient she has passive SI at times, none during shift assessment  Patient contracts for safety  Medication and meal compliant, frequently asking for pepto  VSS  Will continue to monitor frequently

## 2020-12-31 NOTE — PLAN OF CARE
Pt  Has begun to join several groups calmly,pleasantly yet superficially    Problem: Ineffective Coping  Goal: Participates in unit activities  Description: Interventions:  - Provide therapeutic environment   - Provide required programming   - Redirect inappropriate behaviors   Outcome: Progressing

## 2020-12-31 NOTE — PLAN OF CARE
Problem: Risk for Self Injury/Neglect  Goal: Treatment Goal: Remain safe during length of stay, learn and adopt new coping skills, and be free of self-injurious ideation, impulses and acts at the time of discharge  Outcome: Progressing     Problem: Depression  Goal: Treatment Goal: Demonstrate behavioral control of depressive symptoms, verbalize feelings of improved mood/affect, and adopt new coping skills prior to discharge  Outcome: Progressing  Goal: Verbalize thoughts and feelings  Description: Interventions:  - Assess and re-assess patient's level of risk   - Engage patient in 1:1 interactions, daily, for a minimum of 15 minutes   - Encourage patient to express feelings, fears, frustrations, hopes   Outcome: Progressing  Goal: Refrain from harming self  Description: Interventions:  - Monitor patient closely, per order   - Supervise medication ingestion, monitor effects and side effects   Outcome: Progressing  Goal: Refrain from isolation  Description: Interventions:  - Develop a trusting relationship   - Encourage socialization   Outcome: Progressing  Goal: Refrain from self-neglect  Outcome: Progressing  Goal: Attend and participate in unit activities, including therapeutic, recreational, and educational groups  Description: Interventions:  - Provide therapeutic and educational activities daily, encourage attendance and participation, and document same in the medical record   Outcome: Progressing  Goal: Complete daily ADLs, including personal hygiene independently, as able  Description: Interventions:  - Observe, teach, and assist patient with ADLS  -  Monitor and promote a balance of rest/activity, with adequate nutrition and elimination   Outcome: Progressing     Problem: Anxiety  Goal: Anxiety is at manageable level  Description: Interventions:  - Assess and monitor patient's anxiety level     - Monitor for signs and symptoms (heart palpitations, chest pain, shortness of breath, headaches, nausea, feeling jumpy, restlessness, irritable, apprehensive)  - Collaborate with interdisciplinary team and initiate plan and interventions as ordered    - [place] patient to unit/surroundings  - Explain treatment plan  - Encourage participation in care  - Encourage verbalization of concerns/fears  - Identify coping mechanisms  - Assist in developing anxiety-reducing skills  - Administer/offer alternative therapies  - Limit or eliminate stimulants  Outcome: Progressing     Problem: Ineffective Coping  Goal: Participates in unit activities  Description: Interventions:  - Provide therapeutic environment   - Provide required programming   - Redirect inappropriate behaviors   Outcome: Progressing

## 2020-12-31 NOTE — PROGRESS NOTES
12/31/20 0915   Team Meeting   Meeting Type Daily Rounds   Team Members Present   Team Members Present Physician;Nurse;;; Other (Discipline and Name)   Physician Team Member [facility] Team Member [facility] Team Member [id number]   Social Work Team Member Jj Wynn   Other (Discipline and Name) Adriane Cedillo, Pharmacist   Patient/Family Present   Patient Present No   Patient's Family Present No     Patient is visible on the unit  She is confused and requires assistance with ADL's  Patient children seeking to establish POA  This will be discussed with patient

## 2020-12-31 NOTE — PROGRESS NOTES
Progress Note - Behavioral Health   Natesalina Jerome 78 y o  female MRN: [id number]  Unit/Bed#: Yaima Taveras 678-11 Encounter: [id number]    The patient was seen for continuing care and reviewed with treatment team   Staff reports the patient has been calm and cooperative  She tends to have minimal interaction with peers  This morning she says she is starting to feel some improvement in her mood although she does get depressed at times when thinking about her    She is agreeable to her daughter being her POA  She is agreeable to being discharged to a facility because she does not want to be alone  She said repeatedly throughout the interview my kids know I can not be alone day or night"  Her appetite remains decreased but she says she is eating the same amount of food as she always does  She is sleeping better at night  Regarding suicidal thoughts she denies currently and says that she is not having them as frequently anymore      Severe recurrent major depression without psychotic features ([facility] [address] )    Vital signs in last 24 hours:  Temp:  [97 1 °F (36 2 °C)-98 4 °F (36 9 °C)] 98 2 °F (36 8 °C)  HR:  [70-86] 80  Resp:  [16-17] 16  BP: (121-128)/(56-67) 126/56    Mental Status Evaluation:    Appearance Adequate hygiene and grooming   Behavior calm and cooperative   Mood depressed and improving   Speech Normal rate and volume   Affect blunted   Thought Processes Goal directed and coherent   Thought Content Does not verbalize delusional material   Perceptual Disturbances Denies hallucinations and does not appear to be responding to internal stimuli   Risk Potential Suicidal/Homicidal Ideation - No evidence of suicidal or homicidal ideation and Patient does not verbalize suicidal or homicidal ideation  Risk of Violence - No evidence of risk for violence found on assessment  Risk of Self Mutilation - No evidence of risk for self mutilation found on assessment   Sensorium oriented to person, place and time/date Cognition/Memory short term memory impaired   Consciousness alert and awake   Attention/Concentration attention span and concentration appear shorter than expected for age   Insight limited   Judgement limited   Muscle Strength and Gait/Station Not observed, lying in bed   Motor Activity no abnormal movements       Progress Toward Goals:  Placement pending      Recommended Treatment: Continue with pharmacotherapy, group therapy, milieu therapy and occupational therapy    The patient will be maintained on the following medications:  Current Facility-Administered Medications   Medication Dose Route Frequency Provider Last Rate    acetaminophen  650 mg Oral Q6H PRN Filomena Daniel MD      apixaban  2 5 mg Oral BID Zeferino Garcia MD      bismuth subsalicylate  193 mg Oral Q6H PRN Rony Selby MD      cholecalciferol  2,000 Units Oral Daily Filomena Daniel MD      dicyclomine  20 mg Oral 4x Daily (AC & HS) Rony Selby MD      donepezil  10 mg Oral HS Filomena Daniel MD      folic acid  1 mg Oral Daily Zeferino Garcia MD      haloperidol  0 5 mg Oral Q6H PRN Filomena Daniel MD      haloperidol  1 mg Oral Q8H PRN Filomena Daniel MD      haloperidol lactate  2 mg Intramuscular Q6H PRN Filomena Daniel MD      hydrOXYzine HCL  25 mg Oral Q6H PRN Filomena Daniel MD      loperamide  2 mg Oral 4x Daily PRN Dom Pedroza MD      LORazepam  1 mg Intramuscular Q6H PRN Filomena Daniel MD      LORazepam  1 mg Oral Q6H PRN Filomena Daniel MD      mirtazapine  30 mg Oral HS Melissa Bark, CRNP      multivitamin-minerals  1 tablet Oral Daily Filomena Daniel MD      nicotine polacrilex  4 mg Oral Q2H PRN Filomena Daniel MD      nystatin   Topical BID Filomena Daniel MD      pantoprazole  20 mg Oral Early Morning Filomena Daniel MD      thiamine  100 mg Oral Daily Zeferino Garcia MD      traZODone  50 mg Oral HS PRN Filomena Daniel MD         Severe recurrent major depression without psychotic features ([facility] )

## 2020-12-31 NOTE — NURSING NOTE
Pt constricted but pleasant and med-compliant with good appetite and steady gait  VSS  Attended group  Denied SI  Compliant with mask  Monitored for safety and support

## 2021-01-01 PROCEDURE — 99232 SBSQ HOSP IP/OBS MODERATE 35: CPT | Performed by: STUDENT IN AN ORGANIZED HEALTH CARE EDUCATION/TRAINING PROGRAM

## 2021-01-01 RX ADMIN — THIAMINE HCL TAB 100 MG 100 MG: 100 TAB at 08:37

## 2021-01-01 RX ADMIN — APIXABAN 2.5 MG: 2.5 TABLET, FILM COATED ORAL at 08:37

## 2021-01-01 RX ADMIN — DICYCLOMINE HYDROCHLORIDE 20 MG: 20 TABLET ORAL at 21:20

## 2021-01-01 RX ADMIN — DONEPEZIL HYDROCHLORIDE 10 MG: 10 TABLET, FILM COATED ORAL at 21:19

## 2021-01-01 RX ADMIN — NYSTATIN: 100000 CREAM TOPICAL at 17:26

## 2021-01-01 RX ADMIN — APIXABAN 2.5 MG: 2.5 TABLET, FILM COATED ORAL at 17:03

## 2021-01-01 RX ADMIN — Medication 2000 UNITS: at 08:37

## 2021-01-01 RX ADMIN — DICYCLOMINE HYDROCHLORIDE 20 MG: 20 TABLET ORAL at 12:13

## 2021-01-01 RX ADMIN — MIRTAZAPINE 45 MG: 30 TABLET, FILM COATED ORAL at 21:19

## 2021-01-01 RX ADMIN — PANTOPRAZOLE SODIUM 20 MG: 20 TABLET, DELAYED RELEASE ORAL at 06:08

## 2021-01-01 RX ADMIN — Medication 1 TABLET: at 08:37

## 2021-01-01 RX ADMIN — ACETAMINOPHEN 650 MG: 325 TABLET ORAL at 08:39

## 2021-01-01 RX ADMIN — DICYCLOMINE HYDROCHLORIDE 20 MG: 20 TABLET ORAL at 17:03

## 2021-01-01 RX ADMIN — FOLIC ACID 1 MG: 1 TABLET ORAL at 08:37

## 2021-01-01 RX ADMIN — DICYCLOMINE HYDROCHLORIDE 20 MG: 20 TABLET ORAL at 06:08

## 2021-01-01 NOTE — NURSING NOTE
Pt visible on unit, social with select peers  Med and meal compliant  Pt has a steady gait and has been walking independently, but this morning starting telling staff she needed assistance walking  When asked why she needed help, patient states "I need help walking, I can't walk on my own "  When RN told patient that she was observed walking up and down the [place] independently, pt states that's "not true, I need help to walk"  Pt eventually started walking again on her own, steady and without assistance  Pt then told staff she kept trying to pee, but couldn't go  Pt was bladder scanned for 65 ml and bladder appears nondistended  Will continue to monitor

## 2021-01-01 NOTE — NURSING NOTE
Patient was isolative to her room all evening  She was pleasantly confused in conversation  She did not rate depression, but said it "runs on and off in streaks"  She denied having any anxiety, hallucinations, or suicidal/homicidal thoughts  She was cooperative with taking her scheduled evening medications  Safety plan was reviewed with the patient and staff availability was reinforced

## 2021-01-01 NOTE — PROGRESS NOTES
Progress Note - Luz Elena Leo BURDEN  38  Stump 78 y o  female MRN: [id number]  Unit/Bed#Darryle Barter 745-13 Encounter: [id number]    All documentation, nursing notes, labs, and vitals reviewed  The patient's medication reconciliation chart was also analyzed for medication adherence  I personally evaluated Elaina Jerome and discussed current care with treatment team  Scot Bernard was sitting peacefully upon approach  She is quiet and minimally engaging with peers but remains in good behavioral control  She currently states that her mood is "up and down"  She explains that she feels as if she is a burden to her family and thus, does not call them from the inpatient unit  She was encouraged to do so as familial connectivity is essential for progress  This writer offered to walk Scot Bernard down to [employer] and dial out but she refused  She continues to complain of poor sleep  Her appetite erratic  Scot Bernard denies acute thoughts of suicide or self-harm but endorses vague and passive thoughts of death, without an acute plan  She states she "lost it all" after her  passed away and grieving as been difficult  Scot Bernard is adherent to current psychotropic medication regimen  She denies adverse side effects  She does not exhibit objective evidence of renetta/hypomania  She denies acute perceptual disturbances and does not appear internally preoccupied or easily distracted  She was agreeable to further titration of Mirtazapine to assist with alleviation of depression, sleep initiation, and appetite       Mental Status Evaluation:    Appearance:  age appropriate, casually dressed, adequate grooming   Behavior:  pleasant, cooperative, calm   Speech:  normal rate, normal pitch, soft   Mood:  dysphoric   Affect:  constricted, mood-congruent   Thought Process:  organized, logical, concrete   Associations: intact associations   Thought Content:  no overt delusions   Perceptual Disturbances: no auditory hallucinations, no visual hallucinations Risk Potential: Suicidal ideation - Yes, passive death wish but no acute plan  Homicidal ideation - None at present  Potential for aggression - No   Sensorium:  oriented to person and place   Memory:  short term memory impaired, long term memory impaired   Consciousness:  alert and awake    Attention: attention span and concentration are age appropriate   Insight:  limited   Judgment: fair   Gait/Station: normal gait/station   Motor Activity: abnormal movement noted: involuntary oral movements present       Assessment:     Principal Problem:    Depression with suicidal ideation  Active Problems:    COVID-19 virus infection    Suicidal ideation    History of fall    History of alcohol abuse    Cognitive deficits    Anxiety    Anemia of chronic disease    Irritable bowel syndrome with diarrhea      Plan/Recommended Treatment:     - Continue with pharmacotherapy, group therapy, milieu therapy and occupational therapy  - Risks/benefits/alternatives to treatment discussed and Amadou Worley continues to verbalize understanding   - Increase Mirtazapine 30mg QHS to 45mg QHS to alleviate depression, assist with sleep, and stimulate appetite    - Will consider further optimization of psychotropic medication regimen as hospital course progresses   - Continue to assess for adverse medication side effects   - Encourage Kika Mares Queenie to participate in nonverbal forms of therapy including journaling and art/music therapy  - Continue precautionary Q7-minute safety checks  - Continue to engage case management/SW to assist with collateral information, discharge planning, and the implementation of an individualized, patient-centered plan of care    - The patient will be maintained on the following medications:    Current Facility-Administered Medications   Medication Dose Route Frequency Provider Last Rate    acetaminophen  650 mg Oral Q6H PRN Joe King MD      apixaban  2 5 mg Oral BID Krys Daugherty MD      bismuth subsalicylate  789 mg Oral Q6H PRN Liberty Jovel MD      cholecalciferol  2,000 Units Oral Daily Lynch Labor, MD      dicyclomine  20 mg Oral 4x Daily (AC & HS) Liberty Jovel MD      donepezil  10 mg Oral HS Lynch Labor, MD      folic acid  1 mg Oral Daily Filomena Barlow MD      haloperidol  0 5 mg Oral Q6H PRN Lynch Labor, MD      haloperidol  1 mg Oral Q8H PRN Lynch Labor, MD      haloperidol lactate  2 mg Intramuscular Q6H PRN John Labor, MD      hydrOXYzine HCL  25 mg Oral Q6H PRN John Labor, MD      loperamide  2 mg Oral 4x Daily PRN Maria C Denney MD      LORazepam  1 mg Intramuscular Q6H PRN John Labor, MD      LORazepam  1 mg Oral Q6H PRN Lynch Labor, MD      mirtazapine  30 mg Oral HS Earnest [employer]ue, CRNP      multivitamin-minerals  1 tablet Oral Daily Lynch Labor, MD      nicotine polacrilex  4 mg Oral Q2H PRN Lynch Labor, MD      nystatin   Topical BID Lynch Labor, MD      pantoprazole  20 mg Oral Early Morning John Labor, MD      thiamine  100 mg Oral Daily Filomena Barlow MD      traZODone  50 mg Oral HS PRN John Labor, MD

## 2021-01-02 PROCEDURE — 99232 SBSQ HOSP IP/OBS MODERATE 35: CPT | Performed by: STUDENT IN AN ORGANIZED HEALTH CARE EDUCATION/TRAINING PROGRAM

## 2021-01-02 RX ADMIN — FOLIC ACID 1 MG: 1 TABLET ORAL at 08:08

## 2021-01-02 RX ADMIN — Medication 1 TABLET: at 08:08

## 2021-01-02 RX ADMIN — Medication 2000 UNITS: at 08:08

## 2021-01-02 RX ADMIN — DICYCLOMINE HYDROCHLORIDE 20 MG: 20 TABLET ORAL at 11:35

## 2021-01-02 RX ADMIN — LOPERAMIDE HYDROCHLORIDE 2 MG: 2 CAPSULE ORAL at 15:14

## 2021-01-02 RX ADMIN — DICYCLOMINE HYDROCHLORIDE 20 MG: 20 TABLET ORAL at 17:12

## 2021-01-02 RX ADMIN — DICYCLOMINE HYDROCHLORIDE 20 MG: 20 TABLET ORAL at 21:25

## 2021-01-02 RX ADMIN — ACETAMINOPHEN 650 MG: 325 TABLET ORAL at 17:49

## 2021-01-02 RX ADMIN — DICYCLOMINE HYDROCHLORIDE 20 MG: 20 TABLET ORAL at 06:10

## 2021-01-02 RX ADMIN — APIXABAN 2.5 MG: 2.5 TABLET, FILM COATED ORAL at 17:12

## 2021-01-02 RX ADMIN — MIRTAZAPINE 45 MG: 30 TABLET, FILM COATED ORAL at 21:24

## 2021-01-02 RX ADMIN — DONEPEZIL HYDROCHLORIDE 10 MG: 10 TABLET, FILM COATED ORAL at 21:25

## 2021-01-02 RX ADMIN — THIAMINE HCL TAB 100 MG 100 MG: 100 TAB at 08:08

## 2021-01-02 RX ADMIN — APIXABAN 2.5 MG: 2.5 TABLET, FILM COATED ORAL at 08:08

## 2021-01-02 RX ADMIN — PANTOPRAZOLE SODIUM 20 MG: 20 TABLET, DELAYED RELEASE ORAL at 06:10

## 2021-01-02 NOTE — PROGRESS NOTES
Progress Note - Luz Elena Leo Jerome 78 y o  female MRN: [id number]  Unit/Bed#Genette Buerger 468-72 Encounter: [id number]    All documentation, nursing notes, labs, and vitals reviewed  The patient's medication reconciliation chart was also analyzed for medication adherence  I personally evaluated Earney Osgood Stump and discussed current care with treatment team  No acute events last evening  Mora Carrillo was sitting peacefully in the group room today upon approach  She remains pleasant and cooperative  She tolerated the increased dose of Mirtazapine last evening and endorses more restorative sleep  Upon awakening, Mora Carrillo states that she felt "better" from a mood perspective but subsequently looked out of the window and saw the inclement weather and felt "down"  She shares that her mood is "up and down like the weather"  Mora Carrillo speaks at length today about the 4 seasons and her interest in the Spring as she enjoys watching flowers bloom  She is future-oriented and appropriately discusses discharge planning, stating that she "definiateily can't live alone"  She states that her oldest child, Santhosh Baum, will assist with decision making regarding living arrangements  Acutely, Mora Carrillo denies thoughts of suicide or self-harm  Her appetite is fair  She denies new-onset sadness or crying spells over the last 24 hours  She does not exhibit any objective evidence of renetta/hypomania  She denies perceptual abnormalities, like A/V hallucinations, delusions, or paranoia and does not appear internally preoccupied or easily distracted  She is adherent with psychotropic medication regimen and denies adverse side effects  She offers no further complaints       Mental Status Evaluation:    Appearance:  age appropriate, casually dressed, dressed appropriately   Behavior:  pleasant, cooperative, calm   Speech:  clear, coherent, slow, soft   Mood:  improved, euthymic, less depressed   Affect:  more appropriate, less constricted   Thought Process: logical, coherent, normal rate of thoughts, concrete   Associations: intact associations   Thought Content:  normal, no overt delusions   Perceptual Disturbances: no auditory hallucinations, no visual hallucinations   Risk Potential: Suicidal ideation - None  Homicidal ideation - None  Potential for aggression - No   Sensorium:  oriented to person, month of year and year   Memory:  recent memory impaired, remote memory impaired   Consciousness:  alert and awake    Attention: attention span and concentration are age appropriate   Insight:  fair   Judgment: fair   Gait/Station: unstable gait   Motor Activity: no abnormal movements       Assessment:     Principal Problem:    Depression with suicidal ideation  Active Problems:    COVID-19 virus infection    Suicidal ideation    History of fall    History of alcohol abuse    Cognitive deficits    Anxiety    Anemia of chronic disease    Irritable bowel syndrome with diarrhea      Plan/Recommended Treatment:     - Continue with pharmacotherapy, group therapy, milieu therapy and occupational therapy  - Risks/benefits/alternatives to treatment discussed and Rosmery Fung continues to verbalize understanding   - No psychopharmacologic changes necessary at this juncture   - Will consider further optimization of psychotropic medication regimen as hospital course progresses   - Continue to assess for adverse medication side effects   - Encourage Eula Willie Jerome to participate in nonverbal forms of therapy including journaling and art/music therapy  - Continue precautionary Q7-minute safety checks  - Continue to engage case management/SW to assist with collateral information, discharge planning, and the implementation of an individualized, patient-centered plan of care    - The patient will be maintained on the following medications:    Current Facility-Administered Medications   Medication Dose Route Frequency Provider Last Rate    acetaminophen  650 mg Oral Q6H PRN Stacie Soto MD Latanya      apixaban  2 5 mg Oral BID Gary Yen MD      bismuth subsalicylate  768 mg Oral Q6H PRN Jermaine Barraza MD      cholecalciferol  2,000 Units Oral Daily Jacobo Ortega MD      dicyclomine  20 mg Oral 4x Daily (AC & HS) Jermaine Barraza MD      donepezil  10 mg Oral HS Jacobo Ortega MD      folic acid  1 mg Oral Daily Gary Yen MD      haloperidol  0 5 mg Oral Q6H PRN Jacobo Ortega MD      haloperidol  1 mg Oral Q8H PRN Jacobo Ortega MD      haloperidol lactate  2 mg Intramuscular Q6H PRN Jacobo Ortega MD      hydrOXYzine HCL  25 mg Oral Q6H PRN Jacobo Ortega MD      loperamide  2 mg Oral 4x Daily PRN Evelia Faulkner MD      LORazepam  1 mg Intramuscular Q6H PRN Jacobo Ortega MD      LORazepam  1 mg Oral Q6H PRN Jacobo Ortega MD      mirtazapine  45 mg Oral HS Margarita Cunningham MD      multivitamin-minerals  1 tablet Oral Daily Jacobo Ortega MD      nicotine polacrilex  4 mg Oral Q2H PRN Jacobo Ortega MD      nystatin   Topical BID Jacobo Ortega MD      pantoprazole  20 mg Oral Early Morning Jacobo Ortega MD      thiamine  100 mg Oral Daily Gary Yen MD      traZODone  50 mg Oral HS PRN Jacobo Ortega MD

## 2021-01-02 NOTE — PLAN OF CARE
Attended 2/3 groups  Minimal help needed during bingo activity due to some confusion      Problem: Ineffective Coping  Goal: Participates in unit activities  Description: Interventions:  - Provide therapeutic environment   - Provide required programming   - Redirect inappropriate behaviors   Outcome: Progressing

## 2021-01-02 NOTE — PLAN OF CARE
Problem: Risk for Self Injury/Neglect  Goal: Treatment Goal: Remain safe during length of stay, learn and adopt new coping skills, and be free of self-injurious ideation, impulses and acts at the time of discharge  Outcome: Progressing     Problem: Depression  Goal: Treatment Goal: Demonstrate behavioral control of depressive symptoms, verbalize feelings of improved mood/affect, and adopt new coping skills prior to discharge  Outcome: Progressing  Goal: Verbalize thoughts and feelings  Description: Interventions:  - Assess and re-assess patient's level of risk   - Engage patient in 1:1 interactions, daily, for a minimum of 15 minutes   - Encourage patient to express feelings, fears, frustrations, hopes   Outcome: Progressing  Goal: Refrain from harming self  Description: Interventions:  - Monitor patient closely, per order   - Supervise medication ingestion, monitor effects and side effects   Outcome: Progressing  Goal: Refrain from isolation  Description: Interventions:  - Develop a trusting relationship   - Encourage socialization   Outcome: Progressing  Goal: Refrain from self-neglect  Outcome: Progressing  Goal: Complete daily ADLs, including personal hygiene independently, as able  Description: Interventions:  - Observe, teach, and assist patient with ADLS  -  Monitor and promote a balance of rest/activity, with adequate nutrition and elimination   Outcome: Progressing     Problem: Anxiety  Goal: Anxiety is at manageable level  Description: Interventions:  - Assess and monitor patient's anxiety level  - Monitor for signs and symptoms (heart palpitations, chest pain, shortness of breath, headaches, nausea, feeling jumpy, restlessness, irritable, apprehensive)  - Collaborate with interdisciplinary team and initiate plan and interventions as ordered    - [place] patient to unit/surroundings  - Explain treatment plan  - Encourage participation in care  - Encourage verbalization of concerns/fears  - Identify coping mechanisms  - Assist in developing anxiety-reducing skills  - Administer/offer alternative therapies  - Limit or eliminate stimulants  Outcome: Progressing

## 2021-01-02 NOTE — NURSING NOTE
Patient visible on the unit, social with select peers and staff when approached  Patient reports her depression and anxiety are "come and go" throughout the day, passive Sis at times with no plan or intent  Patient contracts for safety  No somatic complaints this shift  Medication and meal compliant, appetite fair  VSS  Will continue to monitor frequently

## 2021-01-02 NOTE — NURSING NOTE
Pt visible on unit, social with select peers  Denies pain  Denies all signs and symptoms  Med and meal compliant  Will continue to monitor

## 2021-01-03 PROCEDURE — 99232 SBSQ HOSP IP/OBS MODERATE 35: CPT | Performed by: STUDENT IN AN ORGANIZED HEALTH CARE EDUCATION/TRAINING PROGRAM

## 2021-01-03 RX ADMIN — DICYCLOMINE HYDROCHLORIDE 20 MG: 20 TABLET ORAL at 21:25

## 2021-01-03 RX ADMIN — DICYCLOMINE HYDROCHLORIDE 20 MG: 20 TABLET ORAL at 16:06

## 2021-01-03 RX ADMIN — Medication 2000 UNITS: at 08:13

## 2021-01-03 RX ADMIN — DONEPEZIL HYDROCHLORIDE 10 MG: 10 TABLET, FILM COATED ORAL at 21:25

## 2021-01-03 RX ADMIN — LOPERAMIDE HYDROCHLORIDE 2 MG: 2 CAPSULE ORAL at 12:08

## 2021-01-03 RX ADMIN — DICYCLOMINE HYDROCHLORIDE 20 MG: 20 TABLET ORAL at 11:57

## 2021-01-03 RX ADMIN — FOLIC ACID 1 MG: 1 TABLET ORAL at 08:13

## 2021-01-03 RX ADMIN — Medication 1 TABLET: at 08:14

## 2021-01-03 RX ADMIN — PANTOPRAZOLE SODIUM 20 MG: 20 TABLET, DELAYED RELEASE ORAL at 05:42

## 2021-01-03 RX ADMIN — APIXABAN 2.5 MG: 2.5 TABLET, FILM COATED ORAL at 08:14

## 2021-01-03 RX ADMIN — APIXABAN 2.5 MG: 2.5 TABLET, FILM COATED ORAL at 17:06

## 2021-01-03 RX ADMIN — MIRTAZAPINE 45 MG: 30 TABLET, FILM COATED ORAL at 21:25

## 2021-01-03 RX ADMIN — DICYCLOMINE HYDROCHLORIDE 20 MG: 20 TABLET ORAL at 06:06

## 2021-01-03 RX ADMIN — THIAMINE HCL TAB 100 MG 100 MG: 100 TAB at 08:14

## 2021-01-03 NOTE — PLAN OF CARE
Problem: Risk for Self Injury/Neglect  Goal: Treatment Goal: Remain safe during length of stay, learn and adopt new coping skills, and be free of self-injurious ideation, impulses and acts at the time of discharge  Outcome: Progressing     Problem: Depression  Goal: Treatment Goal: Demonstrate behavioral control of depressive symptoms, verbalize feelings of improved mood/affect, and adopt new coping skills prior to discharge  Outcome: Progressing  Goal: Verbalize thoughts and feelings  Description: Interventions:  - Assess and re-assess patient's level of risk   - Engage patient in 1:1 interactions, daily, for a minimum of 15 minutes   - Encourage patient to express feelings, fears, frustrations, hopes   Outcome: Progressing  Goal: Refrain from harming self  Description: Interventions:  - Monitor patient closely, per order   - Supervise medication ingestion, monitor effects and side effects   Outcome: Progressing  Goal: Refrain from isolation  Description: Interventions:  - Develop a trusting relationship   - Encourage socialization   Outcome: Progressing  Goal: Refrain from self-neglect  Outcome: Progressing  Goal: Attend and participate in unit activities, including therapeutic, recreational, and educational groups  Description: Interventions:  - Provide therapeutic and educational activities daily, encourage attendance and participation, and document same in the medical record   Outcome: Progressing  Goal: Complete daily ADLs, including personal hygiene independently, as able  Description: Interventions:  - Observe, teach, and assist patient with ADLS  -  Monitor and promote a balance of rest/activity, with adequate nutrition and elimination   Outcome: Progressing     Problem: Anxiety  Goal: Anxiety is at manageable level  Description: Interventions:  - Assess and monitor patient's anxiety level     - Monitor for signs and symptoms (heart palpitations, chest pain, shortness of breath, headaches, nausea, feeling jumpy, restlessness, irritable, apprehensive)  - Collaborate with interdisciplinary team and initiate plan and interventions as ordered    - [place] patient to unit/surroundings  - Explain treatment plan  - Encourage participation in care  - Encourage verbalization of concerns/fears  - Identify coping mechanisms  - Assist in developing anxiety-reducing skills  - Administer/offer alternative therapies  - Limit or eliminate stimulants  Outcome: Progressing

## 2021-01-03 NOTE — NURSING NOTE
Pt visible on unit, social with select peers  Denies pain  Denies SI/HI  Med and meal compliant  Pt offers no other complaints at this  Will continue to monitor

## 2021-01-03 NOTE — PROGRESS NOTES
Progress Note - Luz Elena BURDEN  36  Stump 78 y o  female MRN: [id number]  Unit/Bed#Abdulaziz Zaman 356-70 Encounter: [id number]    All documentation, nursing notes, labs, and vitals reviewed  The patient's medication reconciliation chart was also analyzed for medication adherence  I personally evaluated James Jerome and discussed current care with treatment team  No acute events overnight  Leena Dutton remains visible on the unit  She attended 2/3 groups yesterday  She is simple and concrete in thought be able to make her needs known  She denies neurovegetative symptomatology today  She continues to tolerate increased dose of Mirtazapine without adverse side effects  Today, she reports more restorative sleep last evening  Her appetite is "down and up", stating that she is "not usually a big eater"  Leena Dutton denies SI/HI  She speaks at length about her 's death and appropriate reasons as to why she cannot live alone when discharged  Her daughter will assist with placement  Leena Dutton currently denies signs/symptoms suggestive of renetta/hypomania  She does not exhibit objective evidence of cristofer psychosis  She is adherent to psychotropic medication regimen without side effects  At the conclusion of our assessment, Leena Dutton discusses her likeness for snow given the inclement weather  She is reality-based and logical  She offers no further complaints       Mental Status Evaluation:    Appearance:  age appropriate, casually dressed, dressed appropriately, adequate grooming   Behavior:  pleasant, cooperative, calm   Speech:  normal pitch, slow, soft   Mood:  euthymic   Affect:  normal range and intensity, appropriate   Thought Process:  organized, logical, concrete   Associations: intact associations   Thought Content:  no overt delusions   Perceptual Disturbances: no auditory hallucinations, no visual hallucinations   Risk Potential: Suicidal ideation - None at present  Homicidal ideation - None at present  Potential for aggression - No   Sensorium:  oriented to person, place, situation and month of year   Memory:  recent memory mildly impaired, remote memory mildly impaired   Consciousness:  alert and awake    Attention: attention span and concentration are age appropriate   Insight:  fair   Judgment: good   Gait/Station: normal gait/station, normal balance   Motor Activity: no abnormal movements       Assessment:     Principal Problem:    Depression with suicidal ideation  Active Problems:    COVID-19 virus infection    Suicidal ideation    History of fall    History of alcohol abuse    Cognitive deficits    Anxiety    Anemia of chronic disease    Irritable bowel syndrome with diarrhea      Plan/Recommended Treatment:     - Continue with pharmacotherapy, group therapy, milieu therapy and occupational therapy  - Risks/benefits/alternatives to treatment discussed and Jagdish Magdalene continues to verbalize understanding   - No psychopharmacologic changes necessary at this juncture   - Will consider further optimization of psychotropic medication regimen as hospital course progresses   - Continue to assess for adverse medication side effects   - Encourage Kitty Jerome to participate in nonverbal forms of therapy including journaling and art/music therapy  - Continue precautionary Q7-minute safety checks  - Continue to engage case management/SW to assist with collateral information, discharge planning, and the implementation of an individualized, patient-centered plan of care    - The patient will be maintained on the following medications:    Current Facility-Administered Medications   Medication Dose Route Frequency Provider Last Rate    acetaminophen  650 mg Oral Q6H PRN Ina Simmons MD      apixaban  2 5 mg Oral BID Jose Maria Dunne MD      bismuth subsalicylate  516 mg Oral Q6H PRN Jeison Maloney MD      cholecalciferol  2,000 Units Oral Daily Ina Simmons MD      dicyclomine  20 mg Oral 4x Daily (AC & HS) Jocelin Magalis Kiser MD      donepezil  10 mg Oral HS Junaid Castillo MD      folic acid  1 mg Oral Daily Albania Ramirez MD      haloperidol  0 5 mg Oral Q6H PRN Junaid Castillo MD      haloperidol  1 mg Oral Q8H PRN Junaid Castillo MD      haloperidol lactate  2 mg Intramuscular Q6H PRN Junaid Castillo MD      hydrOXYzine HCL  25 mg Oral Q6H PRN Junaid Castillo MD      loperamide  2 mg Oral 4x Daily PRN Clive Orozco MD      LORazepam  1 mg Intramuscular Q6H PRN Junaid Castillo MD      LORazepam  1 mg Oral Q6H PRN Junaid Castillo MD      mirtazapine  45 mg Oral HS Jennifer Hatfield MD      multivitamin-minerals  1 tablet Oral Daily Junaid Castillo MD      nicotine polacrilex  4 mg Oral Q2H PRN Junaid Castillo MD      nystatin   Topical BID Junaid Castillo MD      pantoprazole  20 mg Oral Early Morning Junaid Castillo MD      thiamine  100 mg Oral Daily Albania Ramirez MD      traZODone  50 mg Oral HS PRN Junaid Castillo MD

## 2021-01-04 PROCEDURE — 99232 SBSQ HOSP IP/OBS MODERATE 35: CPT | Performed by: NURSE PRACTITIONER

## 2021-01-04 RX ADMIN — DICYCLOMINE HYDROCHLORIDE 20 MG: 20 TABLET ORAL at 17:01

## 2021-01-04 RX ADMIN — APIXABAN 2.5 MG: 2.5 TABLET, FILM COATED ORAL at 17:01

## 2021-01-04 RX ADMIN — NYSTATIN: 100000 CREAM TOPICAL at 08:41

## 2021-01-04 RX ADMIN — PANTOPRAZOLE SODIUM 20 MG: 20 TABLET, DELAYED RELEASE ORAL at 06:23

## 2021-01-04 RX ADMIN — Medication 1 TABLET: at 08:41

## 2021-01-04 RX ADMIN — APIXABAN 2.5 MG: 2.5 TABLET, FILM COATED ORAL at 08:41

## 2021-01-04 RX ADMIN — DICYCLOMINE HYDROCHLORIDE 20 MG: 20 TABLET ORAL at 06:23

## 2021-01-04 RX ADMIN — FOLIC ACID 1 MG: 1 TABLET ORAL at 08:41

## 2021-01-04 RX ADMIN — THIAMINE HCL TAB 100 MG 100 MG: 100 TAB at 08:41

## 2021-01-04 RX ADMIN — DONEPEZIL HYDROCHLORIDE 10 MG: 10 TABLET, FILM COATED ORAL at 21:05

## 2021-01-04 RX ADMIN — DICYCLOMINE HYDROCHLORIDE 20 MG: 20 TABLET ORAL at 21:05

## 2021-01-04 RX ADMIN — MIRTAZAPINE 45 MG: 30 TABLET, FILM COATED ORAL at 21:05

## 2021-01-04 RX ADMIN — DICYCLOMINE HYDROCHLORIDE 20 MG: 20 TABLET ORAL at 11:58

## 2021-01-04 RX ADMIN — Medication 2000 UNITS: at 08:41

## 2021-01-04 NOTE — CASE MANAGEMENT
This writer spoke with patient's daughter Rodriguez Zeng regarding her mother treatment and discharge planning  This writer discussed [facility] referral which patient signed which will be sent to [facility]  This writer discussed with Rodriguez Zeng patient's assigning a POA  This writer informed Rodriguez Zeng the [facility] paperwork will have to be signed in the hospital with a  notary  Rodriguez Zeng reports she will discuss with her mother over the phone  Rodriguez Zeng reports her mother spoke with her briefly and informed her she would prefer to got to [place] for [address]  This writer met with patient to discuss referral for [address]  Patient reports she was upset her children did not want to care for her after her  passed away  This writer discussed with patient, her daughter's concern with patient being alone  This writer discussed what patient did when her daughter stayed with her for a week while her father was in the hospital  Patient reports she would not do this if she lives with her children  She reports she can utilize appropriate coping skills if she becomes fearful  This writer suggested a family meeting with the patient and her daughter  This writer also discussed POA, assignment with patient  Patient requested her daughter assigned as POA  This writer informed her Rodriguez Zeng will call and speak with her  Patient reports she will not speak with her daughter during meal time  This writer informed her she can return the call to her daughter if she calls during meal time

## 2021-01-04 NOTE — PROGRESS NOTES
Pt attended 3/3 groups  Pt cooperative and scant responses  Pt able to stay for duration  01/04/21 1330   Activity/Group Checklist   Group Other (Comment)  (community support and information)   Attendance Attended   Attendance Duration (min) 31-45   Interactions Other (Comment)  (scant)   Affect/Mood Calm   Goals Achieved Identified feelings; Identified relapse prevention strategies; Identified resources and support systems; Able to listen to others; Able to engage in interactions

## 2021-01-04 NOTE — PLAN OF CARE
Problem: Risk for Self Injury/Neglect  Goal: Treatment Goal: Remain safe during length of stay, learn and adopt new coping skills, and be free of self-injurious ideation, impulses and acts at the time of discharge  Outcome: Progressing     Problem: Depression  Goal: Treatment Goal: Demonstrate behavioral control of depressive symptoms, verbalize feelings of improved mood/affect, and adopt new coping skills prior to discharge  Outcome: Progressing  Goal: Verbalize thoughts and feelings  Description: Interventions:  - Assess and re-assess patient's level of risk   - Engage patient in 1:1 interactions, daily, for a minimum of 15 minutes   - Encourage patient to express feelings, fears, frustrations, hopes   Outcome: Progressing  Goal: Refrain from harming self  Description: Interventions:  - Monitor patient closely, per order   - Supervise medication ingestion, monitor effects and side effects   Outcome: Progressing  Goal: Refrain from isolation  Description: Interventions:  - Develop a trusting relationship   - Encourage socialization   Outcome: Progressing  Goal: Refrain from self-neglect  Outcome: Progressing  Goal: Attend and participate in unit activities, including therapeutic, recreational, and educational groups  Description: Interventions:  - Provide therapeutic and educational activities daily, encourage attendance and participation, and document same in the medical record   Outcome: Progressing  Goal: Complete daily ADLs, including personal hygiene independently, as able  Description: Interventions:  - Observe, teach, and assist patient with ADLS  -  Monitor and promote a balance of rest/activity, with adequate nutrition and elimination   Outcome: Progressing     Problem: Anxiety  Goal: Anxiety is at manageable level  Description: Interventions:  - Assess and monitor patient's anxiety level     - Monitor for signs and symptoms (heart palpitations, chest pain, shortness of breath, headaches, nausea, feeling jumpy, restlessness, irritable, apprehensive)  - Collaborate with interdisciplinary team and initiate plan and interventions as ordered    - [place] patient to unit/surroundings  - Explain treatment plan  - Encourage participation in care  - Encourage verbalization of concerns/fears  - Identify coping mechanisms  - Assist in developing anxiety-reducing skills  - Administer/offer alternative therapies  - Limit or eliminate stimulants  Outcome: Progressing

## 2021-01-04 NOTE — PROGRESS NOTES
01/04/21 0855   Team Meeting   Meeting Type Daily Rounds   Team Members Present   Team Members Present Physician;Nurse;;   Physician Team Member [facility]   Nursing Team Member [facility] Management Team Member [facility]   Social Work Team Member EUSEBIO   Patient/Family Present   Patient Present No   Patient's Family Present No     Patient is cooperative and pleasant  She is taking her medications   Will begin placement to Assisted Living

## 2021-01-04 NOTE — PLAN OF CARE
Problem: Risk for Self Injury/Neglect  Goal: Treatment Goal: Remain safe during length of stay, learn and adopt new coping skills, and be free of self-injurious ideation, impulses and acts at the time of discharge  Outcome: Progressing     Problem: Depression  Goal: Treatment Goal: Demonstrate behavioral control of depressive symptoms, verbalize feelings of improved mood/affect, and adopt new coping skills prior to discharge  Outcome: Progressing  Goal: Verbalize thoughts and feelings  Description: Interventions:  - Assess and re-assess patient's level of risk   - Engage patient in 1:1 interactions, daily, for a minimum of 15 minutes   - Encourage patient to express feelings, fears, frustrations, hopes   Outcome: Progressing  Goal: Refrain from harming self  Description: Interventions:  - Monitor patient closely, per order   - Supervise medication ingestion, monitor effects and side effects   Outcome: Progressing  Goal: Refrain from isolation  Description: Interventions:  - Develop a trusting relationship   - Encourage socialization   Outcome: Progressing  Goal: Refrain from self-neglect  Outcome: Progressing  Goal: Attend and participate in unit activities, including therapeutic, recreational, and educational groups  Description: Interventions:  - Provide therapeutic and educational activities daily, encourage attendance and participation, and document same in the medical record   Outcome: Progressing  Goal: Complete daily ADLs, including personal hygiene independently, as able  Description: Interventions:  - Observe, teach, and assist patient with ADLS  -  Monitor and promote a balance of rest/activity, with adequate nutrition and elimination   Outcome: Progressing     Problem: Anxiety  Goal: Anxiety is at manageable level  Description: Interventions:  - Assess and monitor patient's anxiety level     - Monitor for signs and symptoms (heart palpitations, chest pain, shortness of breath, headaches, nausea, feeling jumpy, restlessness, irritable, apprehensive)  - Collaborate with interdisciplinary team and initiate plan and interventions as ordered    - [place] patient to unit/surroundings  - Explain treatment plan  - Encourage participation in care  - Encourage verbalization of concerns/fears  - Identify coping mechanisms  - Assist in developing anxiety-reducing skills  - Administer/offer alternative therapies  - Limit or eliminate stimulants  Outcome: Progressing     Problem: Ineffective Coping  Goal: Participates in unit activities  Description: Interventions:  - Provide therapeutic environment   - Provide required programming   - Redirect inappropriate behaviors   Outcome: Progressing

## 2021-01-04 NOTE — NURSING NOTE
Pt blunted and withdrawn but pleasant on approach and med-compliant with good appetite and steady gait  VSS  Denied SI  Compliant with wearing mask  Monitored for safety and support

## 2021-01-04 NOTE — PLAN OF CARE
Pt engaged in 3 of 3 groups  Speaking only when called upon  Alert yet quiet    Problem: Ineffective Coping  Goal: Participates in unit activities  Description: Interventions:  - Provide therapeutic environment   - Provide required programming   - Redirect inappropriate behaviors   Outcome: Progressing

## 2021-01-04 NOTE — NURSING NOTE
Pt noted in the milieu just for a short while and returned to room and bed  Pt offered no concerns and was pleasant with care   Pt med compliant

## 2021-01-05 PROCEDURE — 99232 SBSQ HOSP IP/OBS MODERATE 35: CPT | Performed by: NURSE PRACTITIONER

## 2021-01-05 RX ADMIN — DICYCLOMINE HYDROCHLORIDE 20 MG: 20 TABLET ORAL at 06:00

## 2021-01-05 RX ADMIN — NYSTATIN: 100000 CREAM TOPICAL at 17:25

## 2021-01-05 RX ADMIN — DICYCLOMINE HYDROCHLORIDE 20 MG: 20 TABLET ORAL at 21:15

## 2021-01-05 RX ADMIN — Medication 2000 UNITS: at 08:31

## 2021-01-05 RX ADMIN — DICYCLOMINE HYDROCHLORIDE 20 MG: 20 TABLET ORAL at 17:03

## 2021-01-05 RX ADMIN — DONEPEZIL HYDROCHLORIDE 10 MG: 10 TABLET, FILM COATED ORAL at 21:15

## 2021-01-05 RX ADMIN — THIAMINE HCL TAB 100 MG 100 MG: 100 TAB at 08:31

## 2021-01-05 RX ADMIN — FOLIC ACID 1 MG: 1 TABLET ORAL at 08:32

## 2021-01-05 RX ADMIN — APIXABAN 2.5 MG: 2.5 TABLET, FILM COATED ORAL at 08:32

## 2021-01-05 RX ADMIN — Medication 1 TABLET: at 08:32

## 2021-01-05 RX ADMIN — DICYCLOMINE HYDROCHLORIDE 20 MG: 20 TABLET ORAL at 11:38

## 2021-01-05 RX ADMIN — PANTOPRAZOLE SODIUM 20 MG: 20 TABLET, DELAYED RELEASE ORAL at 05:31

## 2021-01-05 RX ADMIN — MIRTAZAPINE 45 MG: 30 TABLET, FILM COATED ORAL at 21:15

## 2021-01-05 RX ADMIN — ACETAMINOPHEN 650 MG: 325 TABLET ORAL at 15:09

## 2021-01-05 RX ADMIN — NYSTATIN: 100000 CREAM TOPICAL at 08:32

## 2021-01-05 RX ADMIN — APIXABAN 2.5 MG: 2.5 TABLET, FILM COATED ORAL at 17:03

## 2021-01-05 NOTE — NURSING NOTE
Pt more verbal and smiling at times  Good appetite and steady gait  VSS  Denied SI  Attended group  Med-compliant  Compliant with wearing mask  Monitored for safety and support

## 2021-01-05 NOTE — PLAN OF CARE
Problem: Risk for Self Injury/Neglect  Goal: Treatment Goal: Remain safe during length of stay, learn and adopt new coping skills, and be free of self-injurious ideation, impulses and acts at the time of discharge  Outcome: Progressing     Problem: Depression  Goal: Treatment Goal: Demonstrate behavioral control of depressive symptoms, verbalize feelings of improved mood/affect, and adopt new coping skills prior to discharge  Outcome: Progressing  Goal: Verbalize thoughts and feelings  Description: Interventions:  - Assess and re-assess patient's level of risk   - Engage patient in 1:1 interactions, daily, for a minimum of 15 minutes   - Encourage patient to express feelings, fears, frustrations, hopes   Outcome: Progressing  Goal: Refrain from harming self  Description: Interventions:  - Monitor patient closely, per order   - Supervise medication ingestion, monitor effects and side effects   Outcome: Progressing  Goal: Refrain from isolation  Description: Interventions:  - Develop a trusting relationship   - Encourage socialization   Outcome: Progressing  Goal: Refrain from self-neglect  Outcome: Progressing  Goal: Attend and participate in unit activities, including therapeutic, recreational, and educational groups  Description: Interventions:  - Provide therapeutic and educational activities daily, encourage attendance and participation, and document same in the medical record   Outcome: Progressing  Goal: Complete daily ADLs, including personal hygiene independently, as able  Description: Interventions:  - Observe, teach, and assist patient with ADLS  -  Monitor and promote a balance of rest/activity, with adequate nutrition and elimination   Outcome: Progressing     Problem: Anxiety  Goal: Anxiety is at manageable level  Description: Interventions:  - Assess and monitor patient's anxiety level     - Monitor for signs and symptoms (heart palpitations, chest pain, shortness of breath, headaches, nausea, feeling jumpy, restlessness, irritable, apprehensive)  - Collaborate with interdisciplinary team and initiate plan and interventions as ordered    - [place] patient to unit/surroundings  - Explain treatment plan  - Encourage participation in care  - Encourage verbalization of concerns/fears  - Identify coping mechanisms  - Assist in developing anxiety-reducing skills  - Administer/offer alternative therapies  - Limit or eliminate stimulants  Outcome: Progressing     Problem: Ineffective Coping  Goal: Participates in unit activities  Description: Interventions:  - Provide therapeutic environment   - Provide required programming   - Redirect inappropriate behaviors   Outcome: Progressing

## 2021-01-05 NOTE — PLAN OF CARE
Problem: Depression  Goal: Verbalize thoughts and feelings  Description: Interventions:  - Assess and re-assess patient's level of risk   - Engage patient in 1:1 interactions, daily, for a minimum of 15 minutes   - Encourage patient to express feelings, fears, frustrations, hopes   Outcome: Progressing     Problem: Ineffective Coping  Goal: Participates in unit activities  Description: Interventions:  - Provide therapeutic environment   - Provide required programming   - Redirect inappropriate behaviors   Outcome: Progressing

## 2021-01-05 NOTE — PROGRESS NOTES
Pt attended all groups  Pt was able to reminisce and self reflect  Pt spoke about her  being in the [employer] and stationed in [place]  Pt spoke about her experience having a child in [place]  Pt more spontaneous and engaged socially  01/05/21 1330   Activity/Group Checklist   Group Other (Comment)  (positive reflection)   Attendance Attended   Attendance Duration (min) 31-45   Interactions Interacted appropriately   Affect/Mood Bright   Goals Achieved Identified feelings; Discussed coping strategies; Discussed self-esteem issues; Able to listen to others; Able to engage in interactions; Able to self-disclose

## 2021-01-05 NOTE — PROGRESS NOTES
Progress Note - Behavioral Health   Adrkade Jerome 78 y o  female MRN: [id number]  Unit/Bed#: Ashley Sam 943-28 Encounter: [id number]    The patient was seen for continuing care and reviewed with treatment team   Staff reports the patient has been calm, pleasant and cooperative  She has been attending all groups but scant in responses  Placement is pending  She is pleasant on approach and currently working on a crossword puzzle  She reports her mood continues to be up and down  She continues to complain of frequent awakenings at night but is able to fall back asleep  Her appetite remains decreased but she says it is the normal level for her  Denies side effects to medications  Denies SI      Severe recurrent major depression without psychotic features ([facility] [address] )    Vital signs in last 24 hours:  Temp:  [97 5 °F (36 4 °C)-98 4 °F (36 9 °C)] 97 5 °F (36 4 °C)  HR:  [84-95] 88  Resp:  [16] 16  BP: (122-137)/(68-76) 137/76    Mental Status Evaluation:    Appearance Adequate hygiene and grooming and Good eye contact   Behavior calm and cooperative   Mood depressed and improving   Speech Normal rate and volume   Affect blunted   Thought Processes Goal directed and coherent   Thought Content Does not verbalize delusional material   Perceptual Disturbances Denies hallucinations and does not appear to be responding to internal stimuli   Risk Potential Suicidal/Homicidal Ideation - No evidence of suicidal or homicidal ideation and Patient does not verbalize suicidal or homicidal ideation  Risk of Violence - No evidence of risk for violence found on assessment  Risk of Self Mutilation - No evidence of risk for self mutilation found on assessment   Sensorium oriented to person and place   Cognition/Memory short term memory impaired   Consciousness alert and awake   Attention/Concentration attention span and concentration are age appropriate   Insight limited   Judgement limited   Muscle Strength and Gait/Station normal gait/station and normal balance   Motor Activity no abnormal movements       Progress Toward Goals:  Placement pending      Recommended Treatment: Continue with pharmacotherapy, group therapy, milieu therapy and occupational therapy    The patient will be maintained on the following medications:  Current Facility-Administered Medications   Medication Dose Route Frequency Provider Last Rate    acetaminophen  650 mg Oral Q6H PRN Elvin Alba MD      apixaban  2 5 mg Oral BID Sanchez Teixeira MD      bismuth subsalicylate  711 mg Oral Q6H PRN Kaylynn Ballard MD      cholecalciferol  2,000 Units Oral Daily Elvin Alba MD      dicyclomine  20 mg Oral 4x Daily (AC & HS) Kaylynn Ballard MD      donepezil  10 mg Oral HS Elvin Alba MD      folic acid  1 mg Oral Daily Sanchez Teixeira MD      haloperidol  0 5 mg Oral Q6H PRN Elvin Alba MD      haloperidol  1 mg Oral Q8H PRN Elvin Alba MD      haloperidol lactate  2 mg Intramuscular Q6H PRN Elvin Alba MD      hydrOXYzine HCL  25 mg Oral Q6H PRN Elvin Alba MD      loperamide  2 mg Oral 4x Daily PRN Iesha Wiley MD      LORazepam  1 mg Intramuscular Q6H PRN Elvin Alba MD      LORazepam  1 mg Oral Q6H PRN Elvin Alba MD      mirtazapine  45 mg Oral HS Mohsen Romeo MD      multivitamin-minerals  1 tablet Oral Daily Elvin Alba MD      nicotine polacrilex  4 mg Oral Q2H PRN Elvin Alba MD      nystatin   Topical BID Elvin Alba MD      pantoprazole  20 mg Oral Early Morning Elvin Alba MD      thiamine  100 mg Oral Daily Sanchez Teixeira MD      traZODone  50 mg Oral HS PRN Elvin Alba MD         Severe recurrent major depression without psychotic features ([facility] [address] )

## 2021-01-05 NOTE — PROGRESS NOTES
01/05/21 0915   Team Meeting   Meeting Type Daily Rounds   Team Members Present   Team Members Present Physician;Nurse;   Physician Team Member Jaquelin Cavazos   Nursing Team Member [facility]   Care Management Team Member Brandee   Patient/Family Present   Patient Present No   Patient's Family Present No     Pleasant, attends groups, takes medications, slept  CM will facilitate family conference to discuss discharge/housing options

## 2021-01-05 NOTE — NURSING NOTE
Patient visible on the unit, social with staff and peers when approached  Patient reports she feels no depression today, but states she is anxious about placement post discharge  Per patient "I wish my children would let me live with them, but I understand"  Patient denies SI/HI AH/VH  Patient medication and meal compliant, frequently asking for pepto-bismol at start of shift but states the diarrhea has passed  VSS, no pain reported  Will continue to monitor frequently and provide support as needed

## 2021-01-06 PROCEDURE — 99232 SBSQ HOSP IP/OBS MODERATE 35: CPT | Performed by: NURSE PRACTITIONER

## 2021-01-06 RX ADMIN — DICYCLOMINE HYDROCHLORIDE 20 MG: 20 TABLET ORAL at 21:02

## 2021-01-06 RX ADMIN — Medication 1 TABLET: at 08:27

## 2021-01-06 RX ADMIN — DICYCLOMINE HYDROCHLORIDE 20 MG: 20 TABLET ORAL at 06:07

## 2021-01-06 RX ADMIN — APIXABAN 2.5 MG: 2.5 TABLET, FILM COATED ORAL at 08:27

## 2021-01-06 RX ADMIN — THIAMINE HCL TAB 100 MG 100 MG: 100 TAB at 08:27

## 2021-01-06 RX ADMIN — DICYCLOMINE HYDROCHLORIDE 20 MG: 20 TABLET ORAL at 17:26

## 2021-01-06 RX ADMIN — APIXABAN 2.5 MG: 2.5 TABLET, FILM COATED ORAL at 17:26

## 2021-01-06 RX ADMIN — FOLIC ACID 1 MG: 1 TABLET ORAL at 08:27

## 2021-01-06 RX ADMIN — PANTOPRAZOLE SODIUM 20 MG: 20 TABLET, DELAYED RELEASE ORAL at 06:07

## 2021-01-06 RX ADMIN — DICYCLOMINE HYDROCHLORIDE 20 MG: 20 TABLET ORAL at 11:41

## 2021-01-06 RX ADMIN — NYSTATIN: 100000 CREAM TOPICAL at 08:28

## 2021-01-06 RX ADMIN — BISMUTH SUBSALICYLATE 524 MG: 262 LIQUID ORAL at 17:37

## 2021-01-06 RX ADMIN — Medication 2000 UNITS: at 08:27

## 2021-01-06 RX ADMIN — DONEPEZIL HYDROCHLORIDE 10 MG: 10 TABLET, FILM COATED ORAL at 21:02

## 2021-01-06 RX ADMIN — MIRTAZAPINE 45 MG: 30 TABLET, FILM COATED ORAL at 21:02

## 2021-01-06 NOTE — PROGRESS NOTES
Pt attended all groups  Pt alert but limited in conversation when prompted  Pt was visible and focused         01/06/21 1330   Activity/Group Checklist   Group Other (Comment)  (positive reflection)   Attendance Attended   Attendance Duration (min) 31-45   Interactions Did not interact   Affect/Mood Calm   Goals Achieved Able to listen to others

## 2021-01-06 NOTE — NURSING NOTE
Pt constricted but pleasant and med-compliant  Good appetite  VSS  Denied SI  Compliant with mask  Attended group  Monitored for safety and support

## 2021-01-06 NOTE — CASE MANAGEMENT
This writer completed  PASSR and MA 51  This writer faxed completed paperwork to [facility]  This writer spoke with patient's daughter to schedule a family conference on 1/88/21 to discuss discharge planning for the patient  Logan Hernandez reports she spoke with her mother regarding establishing POA  This writer provided information for a travelling [facility]   Family conference meeting via phone on 1/8/21 @ [phone]

## 2021-01-06 NOTE — PLAN OF CARE
Problem: Depression  Goal: Refrain from isolation  Description: Interventions:  - Develop a trusting relationship   - Encourage socialization   Outcome: Progressing     Problem: Anxiety  Goal: Anxiety is at manageable level  Description: Interventions:  - Assess and monitor patient's anxiety level  - Monitor for signs and symptoms (heart palpitations, chest pain, shortness of breath, headaches, nausea, feeling jumpy, restlessness, irritable, apprehensive)  - Collaborate with interdisciplinary team and initiate plan and interventions as ordered    - [place] patient to unit/surroundings  - Explain treatment plan  - Encourage participation in care  - Encourage verbalization of concerns/fears  - Identify coping mechanisms  - Assist in developing anxiety-reducing skills  - Administer/offer alternative therapies  - Limit or eliminate stimulants  Outcome: Progressing

## 2021-01-06 NOTE — PROGRESS NOTES
Progress Note - Behavioral Health   Spero Setting Stump 78 y o  female MRN: [id number]  Unit/Bed#: Seth Laguerre 609-35 Encounter: [id number]    The patient was seen for continuing care and reviewed with treatment team   Staff reports the patient has been more visible and social   She has been attending groups and is more spontaneous during them  She is eating and sleeping well  Placement is pending  She is pleasant on approach  She states she is depressed because she recently lost her  but overall is feeling a little bit better  She does feel sad because she was hoping to live with her children and says that none of them want her and she will have to go to [facility]"  If that happens she prefers to go to [place]  She says she is sleeping pretty well because even though she wakes up a few times she is able to fall back asleep  Appetite has been improving but she says she was never a big eater  She is no longer having suicidal thoughts      Severe recurrent major depression without psychotic features ([facility] [address] )    Vital signs in last 24 hours:  Temp:  [97 6 °F (36 4 °C)-97 9 °F (36 6 °C)] 97 8 °F (36 6 °C)  HR:  [82-90] 90  Resp:  [16-18] 18  BP: (125-160)/(61-87) 137/65    Mental Status Evaluation:    Appearance Adequate hygiene and grooming and Good eye contact   Behavior calm and cooperative   Mood dysphoric   Speech Normal rate and volume   Affect constricted   Thought Processes Goal directed and coherent   Thought Content Does not verbalize delusional material   Perceptual Disturbances Denies hallucinations and does not appear to be responding to internal stimuli   Risk Potential Suicidal/Homicidal Ideation - No evidence of suicidal or homicidal ideation and Patient does not verbalize suicidal or homicidal ideation  Risk of Violence - No evidence of risk for violence found on assessment  Risk of Self Mutilation - No evidence of risk for self mutilation found on assessment   Sensorium oriented to person, place and time/date   Cognition/Memory short term memory mildly impaired   Consciousness alert and awake   Attention/Concentration attention span and concentration appear shorter than expected for age   Insight limited   Judgement limited   Muscle Strength and Gait/Station normal muscle strength and normal muscle tone, normal gait/station and normal balance   Motor Activity no abnormal movements       Progress Toward Goals:  Disposition pending      Recommended Treatment: Continue with pharmacotherapy, group therapy, milieu therapy and occupational therapy    The patient will be maintained on the following medications:  Current Facility-Administered Medications   Medication Dose Route Frequency Provider Last Rate    acetaminophen  650 mg Oral Q6H PRN Amaury Dumont MD      apixaban  2 5 mg Oral BID Michelet Tran MD      bismuth subsalicylate  419 mg Oral Q6H PRN Raphael Pinto MD      cholecalciferol  2,000 Units Oral Daily Amaury Dumont MD      dicyclomine  20 mg Oral 4x Daily (AC & HS) Raphael Pinto MD      donepezil  10 mg Oral HS Amaury Dumont MD      folic acid  1 mg Oral Daily Michelet Tran MD      haloperidol  0 5 mg Oral Q6H PRN Amaury Dumont, MD      haloperidol  1 mg Oral Q8H PRN Amaury Dumont MD      haloperidol lactate  2 mg Intramuscular Q6H PRN Amaury Dumont MD      hydrOXYzine HCL  25 mg Oral Q6H PRN Amaury Dumont MD      loperamide  2 mg Oral 4x Daily PRN Phyllis Srinivasan MD      LORazepam  1 mg Intramuscular Q6H PRN Amaury Dumont MD      LORazepam  1 mg Oral Q6H PRN Amaury Dumont, MD      mirtazapine  45 mg Oral HS Elvie Narayanan MD      multivitamin-minerals  1 tablet Oral Daily Amaury Dumont MD      nicotine polacrilex  4 mg Oral Q2H PRN Amaury Dumont MD      nystatin   Topical BID Amaury Dumont MD      pantoprazole  20 mg Oral Early Morning Amaury Dumont MD      thiamine  100 mg Oral Daily Michelet Tran MD      traZODone  50 mg Oral HS PRN Sarita Guerra MD         Severe recurrent major depression without psychotic features ([facility] [address] )

## 2021-01-06 NOTE — PLAN OF CARE
Pt consistently attending all groups,minimal insight yet calm and willing to interact with peers if they initiate conversation    Problem: Ineffective Coping  Goal: Participates in unit activities  Description: Interventions:  - Provide therapeutic environment   - Provide required programming   - Redirect inappropriate behaviors   Outcome: Progressing

## 2021-01-06 NOTE — PROGRESS NOTES
01/06/21 0925   Team Meeting   Meeting Type Daily Rounds   Team Members Present   Team Members Present Physician;Nurse;;   Physician Team Member [id number] Team [id number] Team Member [facility]   Social Work Team Member [id number]   Patient/Family Present   Patient Present No   Patient's Family Present No     Patient is visible, she attends groups, she continues to report anxiety  Family conference with daughter to discuss discharge plan

## 2021-01-06 NOTE — NURSING NOTE
Patient visible on the unit, social with peers and staff when approached  Patient denies depression, SI/HI AH/VH  Patient states she feels "a little" anxious due to wanting to be discharged  Patient reports 5/10 back pain at start of shift, PRN tylenol given at 1509  Patient reports complete relief an hour later  Patient cooperative and pleasant, smiles at staff and peers in passing  Medication and meal compliant  VSS  Will continue to monitor frequently and provide support as needed

## 2021-01-07 PROCEDURE — 99232 SBSQ HOSP IP/OBS MODERATE 35: CPT | Performed by: NURSE PRACTITIONER

## 2021-01-07 RX ADMIN — Medication 1 TABLET: at 09:14

## 2021-01-07 RX ADMIN — NYSTATIN: 100000 CREAM TOPICAL at 09:15

## 2021-01-07 RX ADMIN — THIAMINE HCL TAB 100 MG 100 MG: 100 TAB at 09:14

## 2021-01-07 RX ADMIN — DONEPEZIL HYDROCHLORIDE 10 MG: 10 TABLET, FILM COATED ORAL at 21:05

## 2021-01-07 RX ADMIN — LOPERAMIDE HYDROCHLORIDE 2 MG: 2 CAPSULE ORAL at 12:10

## 2021-01-07 RX ADMIN — APIXABAN 2.5 MG: 2.5 TABLET, FILM COATED ORAL at 17:14

## 2021-01-07 RX ADMIN — DICYCLOMINE HYDROCHLORIDE 20 MG: 20 TABLET ORAL at 17:14

## 2021-01-07 RX ADMIN — APIXABAN 2.5 MG: 2.5 TABLET, FILM COATED ORAL at 09:14

## 2021-01-07 RX ADMIN — MIRTAZAPINE 45 MG: 30 TABLET, FILM COATED ORAL at 21:05

## 2021-01-07 RX ADMIN — PANTOPRAZOLE SODIUM 20 MG: 20 TABLET, DELAYED RELEASE ORAL at 06:08

## 2021-01-07 RX ADMIN — NYSTATIN: 100000 CREAM TOPICAL at 17:15

## 2021-01-07 RX ADMIN — DICYCLOMINE HYDROCHLORIDE 20 MG: 20 TABLET ORAL at 12:15

## 2021-01-07 RX ADMIN — Medication 2000 UNITS: at 09:14

## 2021-01-07 RX ADMIN — FOLIC ACID 1 MG: 1 TABLET ORAL at 09:14

## 2021-01-07 RX ADMIN — DICYCLOMINE HYDROCHLORIDE 20 MG: 20 TABLET ORAL at 21:05

## 2021-01-07 RX ADMIN — DICYCLOMINE HYDROCHLORIDE 20 MG: 20 TABLET ORAL at 06:08

## 2021-01-07 RX ADMIN — ACETAMINOPHEN 650 MG: 325 TABLET ORAL at 09:14

## 2021-01-07 NOTE — PROGRESS NOTES
Progress Note - Behavioral Health   Eladia Lisette Stump 78 y o  female MRN: [id number]  Unit/Bed#: Zachary Figueroa 082-47 Encounter: [id number]    The patient was seen for continuing care and reviewed with treatment team   Staff reports the patient has been visible and pleasant  She has been attending groups  Sleep and appetite have been good  There is a family meeting tomorrow afternoon to discuss disposition  She is pleasant on approach  She rates her current depression as 4/10 with 10 being the worst and anxiety as 2/4 with 4 being the worst   She states she is sleeping through the night and that her appetite remains decreased which is normal for her  She denies side effects to medications  Her major complaint currently is back pain for which she is taking p r n  Tylenol  She is feeling resigned to going wherever she needs to after discharge but she feels hurt thinking that her children do not want her      Severe recurrent major depression without psychotic features ([facility] [address] [facility]    Vital signs in last 24 hours:  Temp:  [97 4 °F (36 3 °C)-98 7 °F (37 1 °C)] 97 4 °F (36 3 °C)  HR:  [85-93] 93  Resp:  [15-18] 18  BP: (137-138)/(65-72) 138/72    Mental Status Evaluation:    Appearance Adequate hygiene and grooming and Good eye contact   Behavior calm and cooperative   Mood anxious, depressed and improving   Speech Normal rate and volume   Affect blunted   Thought Processes Goal directed and coherent   Thought Content Does not verbalize delusional material   Perceptual Disturbances Denies hallucinations and does not appear to be responding to internal stimuli   Risk Potential Suicidal/Homicidal Ideation - No evidence of suicidal or homicidal ideation and Patient does not verbalize suicidal or homicidal ideation  Risk of Violence - No evidence of risk for violence found on assessment  Risk of Self Mutilation - No evidence of risk for self mutilation found on assessment   Sensorium oriented to person and place   Cognition/Memory short term memory impaired   Consciousness alert and awake   Attention/Concentration attention span and concentration appear shorter than expected for age   Insight limited   Judgement limited   Muscle Strength and Gait/Station normal muscle strength and normal muscle tone, normal gait/station and normal balance   Motor Activity no abnormal movements       Progress Toward Goals:  Progressing      Recommended Treatment: Continue with pharmacotherapy, group therapy, milieu therapy and occupational therapy    The patient will be maintained on the following medications:  Current Facility-Administered Medications   Medication Dose Route Frequency Provider Last Rate    acetaminophen  650 mg Oral Q6H PRN Juan Martin MD      apixaban  2 5 mg Oral BID Nathalie Amador MD      bismuth subsalicylate  063 mg Oral Q6H PRN Haley Lazar MD      cholecalciferol  2,000 Units Oral Daily Juan Martin MD      dicyclomine  20 mg Oral 4x Daily (AC & HS) Haley Lazar MD      donepezil  10 mg Oral HS Juan Martin MD      folic acid  1 mg Oral Daily Nathalie Amador MD      haloperidol  0 5 mg Oral Q6H PRN Juan Martin MD      haloperidol  1 mg Oral Q8H PRN Juan Martin MD      haloperidol lactate  2 mg Intramuscular Q6H PRN Juan Martin MD      hydrOXYzine HCL  25 mg Oral Q6H PRN Juan Martin MD      loperamide  2 mg Oral 4x Daily PRN Du Brooks MD      LORazepam  1 mg Intramuscular Q6H PRN Juan Martin MD      LORazepam  1 mg Oral Q6H PRN Juan Martin MD      mirtazapine  45 mg Oral HS Jesse Disla MD      multivitamin-minerals  1 tablet Oral Daily Juan Martin MD      nicotine polacrilex  4 mg Oral Q2H PRN Juan Martin MD      nystatin   Topical BID Juan Martin MD      pantoprazole  20 mg Oral Early Morning Juan Martin MD      thiamine  100 mg Oral Daily Nathalie Amador MD      traZODone  50 mg Oral HS PRN Juan Martin MD         Severe recurrent [facility] depression without psychotic features [facility]

## 2021-01-07 NOTE — PLAN OF CARE
Problem: Risk for Self Injury/Neglect  Goal: Treatment Goal: Remain safe during length of stay, learn and adopt new coping skills, and be free of self-injurious ideation, impulses and acts at the time of discharge  Outcome: Progressing     Problem: Depression  Goal: Treatment Goal: Demonstrate behavioral control of depressive symptoms, verbalize feelings of improved mood/affect, and adopt new coping skills prior to discharge  Outcome: Progressing  Goal: Verbalize thoughts and feelings  Description: Interventions:  - Assess and re-assess patient's level of risk   - Engage patient in 1:1 interactions, daily, for a minimum of 15 minutes   - Encourage patient to express feelings, fears, frustrations, hopes   Outcome: Progressing  Goal: Refrain from harming self  Description: Interventions:  - Monitor patient closely, per order   - Supervise medication ingestion, monitor effects and side effects   Outcome: Progressing  Goal: Refrain from isolation  Description: Interventions:  - Develop a trusting relationship   - Encourage socialization   Outcome: Progressing  Goal: Refrain from self-neglect  Outcome: Progressing  Goal: Attend and participate in unit activities, including therapeutic, recreational, and educational groups  Description: Interventions:  - Provide therapeutic and educational activities daily, encourage attendance and participation, and document same in the medical record   Outcome: Progressing  Goal: Complete daily ADLs, including personal hygiene independently, as able  Description: Interventions:  - Observe, teach, and assist patient with ADLS  -  Monitor and promote a balance of rest/activity, with adequate nutrition and elimination   Outcome: Progressing     Problem: Anxiety  Goal: Anxiety is at manageable level  Description: Interventions:  - Assess and monitor patient's anxiety level     - Monitor for signs and symptoms (heart palpitations, chest pain, shortness of breath, headaches, nausea, feeling jumpy, restlessness, irritable, apprehensive)  - Collaborate with interdisciplinary team and initiate plan and interventions as ordered    - [place] patient to unit/surroundings  - Explain treatment plan  - Encourage participation in care  - Encourage verbalization of concerns/fears  - Identify coping mechanisms  - Assist in developing anxiety-reducing skills  - Administer/offer alternative therapies  - Limit or eliminate stimulants  Outcome: Progressing     Problem: Ineffective Coping  Goal: Participates in unit activities  Description: Interventions:  - Provide therapeutic environment   - Provide required programming   - Redirect inappropriate behaviors   Outcome: Progressing

## 2021-01-07 NOTE — NURSING NOTE
Patient  Visible on the unit, social with select peers and staff  Patient bright and pleasant when approached, cooperative with assessment questions  Reports her depression is a 3/10, states she feels depressed when thinking about husbands passing  Patient denies anxiety, SI/HI AH/VH  Per patient she feels better when surrounded with people  Patient displays better insight, more social with peers on the unit  PRN pepto given for diarrhea episode  VSS, no pain reported  Will continue to monitor frequently and provide support as needed

## 2021-01-07 NOTE — PROGRESS NOTES
01/07/21 0858   Team Meeting   Meeting Type Daily Rounds   Initial Conference Date 01/07/21   Team Members Present   Team Members Present Physician;Nurse;   Physician Team Member Dr Milagro Marks Team Member [facility] RN   Care Management Team Member Kenisha   Patient/Family Present   Patient Present No   Patient's Family Present No     Visible, pleasant, 3/10 depression, denies other s/s, eating well, sleeping well, attended groups

## 2021-01-07 NOTE — PROGRESS NOTES
Pt attended [facility] relapse prevention plan group  Pt noted signs and symptoms upon admission were "forgetfulness, repeating self and not sleeping good"  Crisis and warmline phone numbers provided  Pt signed and copy in chart  01/07/21 1000   Activity/Group Checklist   Group Other (Comment)  (  relapse prevention plan group)   Attendance Attended   Attendance Duration (min) 31-45   Interactions Interacted appropriately   Affect/Mood Appropriate   Goals Achieved Identified feelings; Discussed coping strategies; Able to listen to others; Able to engage in interactions

## 2021-01-07 NOTE — PLAN OF CARE
Problem: Depression  Goal: Verbalize thoughts and feelings  Description: Interventions:  - Assess and re-assess patient's level of risk   - Engage patient in 1:1 interactions, daily, for a minimum of 15 minutes   - Encourage patient to express feelings, fears, frustrations, hopes   Outcome: Progressing  Goal: Refrain from isolation  Description: Interventions:  - Develop a trusting relationship   - Encourage socialization   Outcome: Progressing  Goal: Attend and participate in unit activities, including therapeutic, recreational, and educational groups  Description: Interventions:  - Provide therapeutic and educational activities daily, encourage attendance and participation, and document same in the medical record   Outcome: Progressing

## 2021-01-07 NOTE — NURSING NOTE
Pt anxious and constricted but pleasant and visible on unit  Good appetite and steady gait  Using tylenol for back pain with some positive effect  Denied SI  Attended group  Monitored for safety and support

## 2021-01-08 PROBLEM — F03.90 MAJOR NEUROCOGNITIVE DISORDER (HCC): Status: ACTIVE | Noted: 2020-12-20

## 2021-01-08 PROCEDURE — 99231 SBSQ HOSP IP/OBS SF/LOW 25: CPT | Performed by: NURSE PRACTITIONER

## 2021-01-08 RX ADMIN — DICYCLOMINE HYDROCHLORIDE 20 MG: 20 TABLET ORAL at 06:06

## 2021-01-08 RX ADMIN — MIRTAZAPINE 45 MG: 30 TABLET, FILM COATED ORAL at 21:44

## 2021-01-08 RX ADMIN — APIXABAN 2.5 MG: 2.5 TABLET, FILM COATED ORAL at 09:15

## 2021-01-08 RX ADMIN — ACETAMINOPHEN 650 MG: 325 TABLET ORAL at 11:18

## 2021-01-08 RX ADMIN — THIAMINE HCL TAB 100 MG 100 MG: 100 TAB at 09:15

## 2021-01-08 RX ADMIN — PANTOPRAZOLE SODIUM 20 MG: 20 TABLET, DELAYED RELEASE ORAL at 06:06

## 2021-01-08 RX ADMIN — DICYCLOMINE HYDROCHLORIDE 20 MG: 20 TABLET ORAL at 17:18

## 2021-01-08 RX ADMIN — DONEPEZIL HYDROCHLORIDE 10 MG: 10 TABLET, FILM COATED ORAL at 21:44

## 2021-01-08 RX ADMIN — Medication 2000 UNITS: at 09:15

## 2021-01-08 RX ADMIN — FOLIC ACID 1 MG: 1 TABLET ORAL at 09:15

## 2021-01-08 RX ADMIN — DICYCLOMINE HYDROCHLORIDE 20 MG: 20 TABLET ORAL at 11:17

## 2021-01-08 RX ADMIN — DICYCLOMINE HYDROCHLORIDE 20 MG: 20 TABLET ORAL at 21:44

## 2021-01-08 RX ADMIN — Medication 1 TABLET: at 09:15

## 2021-01-08 RX ADMIN — APIXABAN 2.5 MG: 2.5 TABLET, FILM COATED ORAL at 17:18

## 2021-01-08 NOTE — NURSING NOTE
Patient is visible but appears anxious and  rates anxiety 2/4  She is pleasant on approach but guarded; superficial yet barely knows writer so this could account for her hesitance   She denies SI

## 2021-01-08 NOTE — NURSING NOTE
Pt anxious and constricted but pleasant and med-compliant  Good appetite and steady gait  VSS  Pt using tylenol po prn for back pain with some positive effect  Denied SI  Attended group  Compliant with mask  Monitored for safety and support

## 2021-01-08 NOTE — PROGRESS NOTES
Progress Note - Behavioral Health   Rose Amezquita Stump 78 y o  female MRN: [id number]  Unit/Bed#: [facility] 616-30 Encounter: [id number]    The patient was seen for continuing care and reviewed with treatment team   Staff reports the patient has been pleasant and cooperative as well as visible and social   She has been attending groups and interacting appropriately  Family meeting is this afternoon  She brightens on approach  She said she is currently in bed because she is feeling cold and bored  She said she will get up for groups because she looks forward to having activities to do  She rates her current depression as a 5 or 6/10 with 10 being the worst and anxiety as 1 or 2/4 with 4 being the worst   She states she wakes up 1 or 2 times at night but is able to fall back asleep  Her appetite has been normal   Denies side effects of medication  Denies SI      Severe recurrent major depression without psychotic features ([facility] [address] )    Vital signs in last 24 hours:  Temp:  [97 3 °F (36 3 °C)-97 8 °F (36 6 °C)] 97 8 °F (36 6 °C)  HR:  [75-86] 86  Resp:  [16] 16  BP: (116-134)/(61-82) 116/64    Mental Status Evaluation:    Appearance Adequate hygiene and grooming and Good eye contact   Behavior calm and cooperative   Mood anxious, depressed and improving   Speech Normal rate and volume   Affect blunted   Thought Processes Goal directed and coherent   Thought Content Does not verbalize delusional material   Perceptual Disturbances Denies hallucinations and does not appear to be responding to internal stimuli   Risk Potential Suicidal/Homicidal Ideation - No evidence of suicidal or homicidal ideation and Patient does not verbalize suicidal or homicidal ideation  Risk of Violence - No evidence of risk for violence found on assessment  Risk of Self Mutilation - No evidence of risk for self mutilation found on assessment   Sensorium oriented to person and place   Cognition/Memory short term memory impaired   Consciousness alert and awake   Attention/Concentration attention span and concentration appear shorter than expected for age   Insight limited   Judgement limited   Muscle Strength and Gait/Station normal muscle strength and normal muscle tone, normal gait/station and normal balance   Motor Activity no abnormal movements       Progress Toward Goals:  Progressing      Recommended Treatment: Continue with pharmacotherapy, group therapy, milieu therapy and occupational therapy    The patient will be maintained on the following medications:  Current Facility-Administered Medications   Medication Dose Route Frequency Provider Last Rate    acetaminophen  650 mg Oral Q6H PRN Valentina Antunez MD      apixaban  2 5 mg Oral BID Yosef Rosales MD      bismuth subsalicylate  949 mg Oral Q6H PRN Rj Mills MD      cholecalciferol  2,000 Units Oral Daily Valentina Antunez MD      dicyclomine  20 mg Oral 4x Daily (AC & HS) Rj Mills MD      donepezil  10 mg Oral HS Valentina Antunez MD      folic acid  1 mg Oral Daily Yosef Rosales MD      haloperidol  0 5 mg Oral Q6H PRN Valentina Antunez MD      haloperidol  1 mg Oral Q8H PRN Valentina Antunez MD      haloperidol lactate  2 mg Intramuscular Q6H PRN aVlentina Antunez MD      hydrOXYzine HCL  25 mg Oral Q6H PRN Valentina Antunez MD      loperamide  2 mg Oral 4x Daily PRN Lisbet Keller MD      LORazepam  1 mg Intramuscular Q6H PRN Valentina Antunez MD      LORazepam  1 mg Oral Q6H PRN Valentina Antunez MD      mirtazapine  45 mg Oral HS Ade Roy MD      multivitamin-minerals  1 tablet Oral Daily Valentina Antunez MD      nicotine polacrilex  4 mg Oral Q2H PRN Valentina Antunez MD      nystatin   Topical BID Valentina Antunez MD      pantoprazole  20 mg Oral Early Morning Valentina Antunez MD      thiamine  100 mg Oral Daily Yosef Rosales MD      traZODone  50 mg Oral HS PRN Valentina Antunez MD         Se[facility]

## 2021-01-08 NOTE — PROGRESS NOTES
01/08/21 0938   Team Meeting   Meeting Type Daily Rounds   Team Members Present   Team Members Present Physician;Nurse;   Physician Team Member [facility]   Nursing Team Member [facility] Management Team Member Brandee   Patient/Family Present   Patient Present No   Patient's Family Present No     Patient reports depression and anxiety  Patient will have family meeting to discuss discharge planning  Aging Assessment to be completed today

## 2021-01-09 RX ADMIN — APIXABAN 2.5 MG: 2.5 TABLET, FILM COATED ORAL at 08:07

## 2021-01-09 RX ADMIN — THIAMINE HCL TAB 100 MG 100 MG: 100 TAB at 08:07

## 2021-01-09 RX ADMIN — MIRTAZAPINE 45 MG: 30 TABLET, FILM COATED ORAL at 21:15

## 2021-01-09 RX ADMIN — Medication 1 TABLET: at 08:07

## 2021-01-09 RX ADMIN — DONEPEZIL HYDROCHLORIDE 10 MG: 10 TABLET, FILM COATED ORAL at 21:15

## 2021-01-09 RX ADMIN — Medication 2000 UNITS: at 08:07

## 2021-01-09 RX ADMIN — DICYCLOMINE HYDROCHLORIDE 20 MG: 20 TABLET ORAL at 11:06

## 2021-01-09 RX ADMIN — FOLIC ACID 1 MG: 1 TABLET ORAL at 08:07

## 2021-01-09 RX ADMIN — APIXABAN 2.5 MG: 2.5 TABLET, FILM COATED ORAL at 17:14

## 2021-01-09 RX ADMIN — DICYCLOMINE HYDROCHLORIDE 20 MG: 20 TABLET ORAL at 21:15

## 2021-01-09 RX ADMIN — DICYCLOMINE HYDROCHLORIDE 20 MG: 20 TABLET ORAL at 06:02

## 2021-01-09 RX ADMIN — PANTOPRAZOLE SODIUM 20 MG: 20 TABLET, DELAYED RELEASE ORAL at 05:44

## 2021-01-09 RX ADMIN — DICYCLOMINE HYDROCHLORIDE 20 MG: 20 TABLET ORAL at 17:14

## 2021-01-09 NOTE — PLAN OF CARE
Problem: Risk for Self Injury/Neglect  Goal: Treatment Goal: Remain safe during length of stay, learn and adopt new coping skills, and be free of self-injurious ideation, impulses and acts at the time of discharge  Outcome: Progressing     Problem: Depression  Goal: Treatment Goal: Demonstrate behavioral control of depressive symptoms, verbalize feelings of improved mood/affect, and adopt new coping skills prior to discharge  Outcome: Progressing  Goal: Verbalize thoughts and feelings  Description: Interventions:  - Assess and re-assess patient's level of risk   - Engage patient in 1:1 interactions, daily, for a minimum of 15 minutes   - Encourage patient to express feelings, fears, frustrations, hopes   Outcome: Progressing  Goal: Refrain from harming self  Description: Interventions:  - Monitor patient closely, per order   - Supervise medication ingestion, monitor effects and side effects   Outcome: Progressing  Goal: Refrain from isolation  Description: Interventions:  - Develop a trusting relationship   - Encourage socialization   Outcome: Progressing  Goal: Refrain from self-neglect  Outcome: Progressing  Goal: Attend and participate in unit activities, including therapeutic, recreational, and educational groups  Description: Interventions:  - Provide therapeutic and educational activities daily, encourage attendance and participation, and document same in the medical record   Outcome: Progressing  Goal: Complete daily ADLs, including personal hygiene independently, as able  Description: Interventions:  - Observe, teach, and assist patient with ADLS  -  Monitor and promote a balance of rest/activity, with adequate nutrition and elimination   Outcome: Progressing     Problem: Anxiety  Goal: Anxiety is at manageable level  Description: Interventions:  - Assess and monitor patient's anxiety level     - Monitor for signs and symptoms (heart palpitations, chest pain, shortness of breath, headaches, nausea, feeling jumpy, restlessness, irritable, apprehensive)  - Collaborate with interdisciplinary team and initiate plan and interventions as ordered    - [place] patient to unit/surroundings  - Explain treatment plan  - Encourage participation in care  - Encourage verbalization of concerns/fears  - Identify coping mechanisms  - Assist in developing anxiety-reducing skills  - Administer/offer alternative therapies  - Limit or eliminate stimulants  Outcome: Progressing     Problem: Ineffective Coping  Goal: Participates in unit activities  Description: Interventions:  - Provide therapeutic environment   - Provide required programming   - Redirect inappropriate behaviors   Outcome: Progressing

## 2021-01-09 NOTE — NURSING NOTE
Patient visible on the unit, social with select peers and staff  Patient denies depression and anxiety, SI/HI AH/VH  Patient wandering the unit, smiles at staff in passing  Cooperative with assessment, reports diarrhea and requests imodium  Patient asked to notify staff next time she had an episode of diarrhea, patient states "I dont have any right now, but I still need imodium"  Patient counseled  VSS  Medication and meal compliant, appetite good  Will continue to monitor frequently and provide support as needed

## 2021-01-09 NOTE — NURSING NOTE
Pt was overheard talking on the phone with unknown person  Pt asks person on the phone "Where do I live?"  Pt then asks "Do I still live in my house?"  Pt then says "Because I don't have my keys and I don't know how I got to work today "  Pt states "I'm in a building with a bunch of kids and teachers "  When patient got off the phone, RN asked patient where she was, and pt states "I'm not sure "  Pt's mood is labile  She goes from very pleasant in one interaction to irritable and guarded in the next interaction  Will continue to monitor

## 2021-01-09 NOTE — PLAN OF CARE
Attended 2/2 groups  Declined to participate in unit group activities, but remained throughout and stated "I just like to watch"     Problem: Ineffective Coping  Goal: Participates in unit activities  Description: Interventions:  - Provide therapeutic environment   - Provide required programming   - Redirect inappropriate behaviors   Outcome: Progressing

## 2021-01-09 NOTE — PROGRESS NOTES
Psychiatry Progress Note    Subjective: Interval History     Patient reports that she is feeling anxious this morning  Reports that she does not feel that she can live alone  Reports that she wants to leave the hospital but she does not feel that she should live alone and she does not know what her other options are  Reporting that has been difficult for her since her  , which she reports was several weeks ago  Reports at that time she was depressed and had thoughts of not wanting to live  However reports that she has not had any of those thoughts in the recent past   Patient denies any suicidal ideations or homicidal ideations  Medication and meal compliant  Did sleep well  Has been bowel focused and needing redirection and support from staff      Behavior over the last 24 hours:  unchanged  Sleep: normal  Appetite: normal  Medication side effects: No  ROS: no complaints    Current medications:    Current Facility-Administered Medications:     acetaminophen (TYLENOL) tablet 650 mg, 650 mg, Oral, Q6H PRN, Girish Sarabia MD, 650 mg at 21 1118    apixaban (ELIQUIS) tablet 2 5 mg, 2 5 mg, Oral, BID, Rashida Canada MD, 2 5 mg at 21 0807    bismuth subsalicylate (PEPTO BISMOL) oral suspension 524 mg, 524 mg, Oral, Q6H PRN, Kajal Toro MD, 524 mg at 21 1737    cholecalciferol (VITAMIN D3) tablet 2,000 Units, 2,000 Units, Oral, Daily, Girish Sarabia MD, 2,000 Units at 21 0807    dicyclomine (BENTYL) tablet 20 mg, 20 mg, Oral, 4x Daily (AC & HS), Kajal Toro MD, 20 mg at 21 0602    donepezil (ARICEPT) tablet 10 mg, 10 mg, Oral, HS, Girish Sarabia MD, 10 mg at  8338    folic acid (FOLVITE) tablet 1 mg, 1 mg, Oral, Daily, Rashida Canada MD, 1 mg at 21 0807    haloperidol (HALDOL) tablet 0 5 mg, 0 5 mg, Oral, Q6H PRN, Girish Sarabia MD    haloperidol (HALDOL) tablet 1 mg, 1 mg, Oral, Q8H PRN, Girish Sarabia MD    haloperidol lactate (HALDOL) injection 2 mg, 2 mg, Intramuscular, Q6H PRN, Padmini Askew MD    hydrOXYzine HCL (ATARAX) tablet 25 mg, 25 mg, Oral, Q6H PRN, Padmini Askew MD    loperamide (IMODIUM) capsule 2 mg, 2 mg, Oral, 4x Daily PRN, Chelsi Hernandez MD, 2 mg at 01/07/21 1210    LORazepam (ATIVAN) injection 1 mg, 1 mg, Intramuscular, Q6H PRN, Padmini Askew MD    LORazepam (ATIVAN) tablet 1 mg, 1 mg, Oral, Q6H PRN, Padmini Askew MD    mirtazapine (REMERON) tablet 45 mg, 45 mg, Oral, HS, Lizette Meng MD, 45 mg at 01/08/21 2144    multivitamin-minerals (CENTRUM) tablet 1 tablet, 1 tablet, Oral, Daily, Padmini Askew MD, 1 tablet at 01/09/21 0807    nicotine polacrilex (NICORETTE) gum 4 mg, 4 mg, Oral, Q2H PRN, Padmini Askew MD    nystatin (MYCOSTATIN) cream, , Topical, BID, Padmini Askew MD, Given at 01/07/21 1715    pantoprazole (PROTONIX) EC tablet 20 mg, 20 mg, Oral, Early Morning, Padmini Askew MD, 20 mg at 01/09/21 0544    thiamine (VITAMIN B1) tablet 100 mg, 100 mg, Oral, Daily, Glenda Pruett MD, 100 mg at 01/09/21 0807    traZODone (DESYREL) tablet 50 mg, 50 mg, Oral, HS PRN, Padmini Askew MD    Current Problem List:    Patient Active Problem List   Diagnosis    Hyponatremia    Alcohol intoxication ([facility] [address] )    COVID-19 virus infection    Suicidal ideation    Alcohol abuse    Multiple falls    History of fall    Depression with suicidal ideation    History of alcohol abuse    Major neurocognitive disorder (HCC)    Anxiety    Anemia of chronic disease    Irritable bowel syndrome with diarrhea       Problem list reviewed 01/09/21     Objective:     Vital Signs:  Vitals:    01/08/21 0626 01/08/21 1453 01/08/21 2113 01/09/21 0620   BP: 116/64 122/68 139/71 134/68   BP Location: Right arm Right arm Right arm Right arm   Pulse: 86 79 76 91   Resp: 16 16 16 16   Temp: 97 8 °F (36 6 °C) 98 7 °F (37 1 °C) 97 7 °F (36 5 °C) 97 9 °F (36 6 °C)   TempSrc: Temporal Temporal Temporal Temporal   SpO2: 94% 95% 96% 93%   Weight:       Height:             Appearance:  age appropriate and casually dressed   Behavior:  normal   Speech:  normal volume   Mood:  anxious   Affect:  blunted   Thought Process:  normal   Thought Content:  normal   Perceptual Disturbances: None   Risk Potential: none   Sensorium:  person, place and time   Cognition:  intact   Consciousness:  alert and awake    Attention: attention span and concentration were age appropriate   Intellect: average   Insight:  limited   Judgment: limited      Motor Activity: no abnormal movements       I/O Past 24 hours:  I/O last 3 completed shifts: In: 906 [P O :874]  Out: -   No intake/output data recorded  Labs:  Reviewed 01/09/21    Progress Toward Goals:  Unchanged    Assessment / Plan:     Severe recurrent major depression without psychotic features [facility]    Recommended Treatment:      Medication changes:  1) continue current treatment plan    Non-pharmacological treatments  1) Continue with group therapy, milieu therapy and occupational therapy  Safety  1) Safety/communication plan established targeting dynamic risk factors above  2) Risks, benefits, and possible side effects of medications explained to patient and patient verbalizes understanding  Counseling / Coordination of Care    Total floor / unit time spent today 20 minutes  Greater than 50% of total time was spent with the patient and / or family counseling and / or coordination of care  A description of the counseling / coordination of care  Patient's Rights, confidentiality and exceptions to confidentiality, use of automated medical record, [facility] staff access to medical record, and consent to treatment reviewed      Heaven Yarbrough PA-C

## 2021-01-09 NOTE — NURSING NOTE
Pt visible on unit, social with select peers  Denies pain  Denies SI/HI  Continues to be preoccupied with needing Imodium, easily redirected  Med and meal compliant  Pt offers no other complaints at this time  Will continue to monitor

## 2021-01-10 RX ADMIN — DONEPEZIL HYDROCHLORIDE 10 MG: 10 TABLET, FILM COATED ORAL at 21:48

## 2021-01-10 RX ADMIN — PANTOPRAZOLE SODIUM 20 MG: 20 TABLET, DELAYED RELEASE ORAL at 06:13

## 2021-01-10 RX ADMIN — DICYCLOMINE HYDROCHLORIDE 20 MG: 20 TABLET ORAL at 06:13

## 2021-01-10 RX ADMIN — LOPERAMIDE HYDROCHLORIDE 2 MG: 2 CAPSULE ORAL at 17:49

## 2021-01-10 RX ADMIN — APIXABAN 2.5 MG: 2.5 TABLET, FILM COATED ORAL at 08:10

## 2021-01-10 RX ADMIN — DICYCLOMINE HYDROCHLORIDE 20 MG: 20 TABLET ORAL at 21:48

## 2021-01-10 RX ADMIN — Medication 1 TABLET: at 08:10

## 2021-01-10 RX ADMIN — APIXABAN 2.5 MG: 2.5 TABLET, FILM COATED ORAL at 17:09

## 2021-01-10 RX ADMIN — MIRTAZAPINE 45 MG: 30 TABLET, FILM COATED ORAL at 21:48

## 2021-01-10 RX ADMIN — THIAMINE HCL TAB 100 MG 100 MG: 100 TAB at 08:10

## 2021-01-10 RX ADMIN — FOLIC ACID 1 MG: 1 TABLET ORAL at 08:10

## 2021-01-10 RX ADMIN — Medication 2000 UNITS: at 08:10

## 2021-01-10 RX ADMIN — DICYCLOMINE HYDROCHLORIDE 20 MG: 20 TABLET ORAL at 12:00

## 2021-01-10 RX ADMIN — DICYCLOMINE HYDROCHLORIDE 20 MG: 20 TABLET ORAL at 16:09

## 2021-01-10 NOTE — NURSING NOTE
Pt irritable with RN because she isn't being given a second dose of Imodium  Pt had a dose at [phone] and keeps approaching RN to say that she is having diarrhea  Pt states that at home she usually has to take two  RN told pt that it's not ordered that way  This bowel preoccupation is normal for patient

## 2021-01-10 NOTE — NURSING NOTE
Patient was visible walking on the unit  She has periods of irritability  Writer was attempting to have a conversation with the patient however she keeps walking away  She denied any needs  VSS  Compliant with medications  Monitored on q 7 minute checks

## 2021-01-10 NOTE — NURSING NOTE
Pt visible on unit, social with select peers  Denies pain  Denies SI/HI  Pt continues to be labile with her behavior, friendly one minute and irritable the next  Med and meal compliant  Offers no complaints at this time  Will continue to monitor

## 2021-01-10 NOTE — PROGRESS NOTES
Psychiatry Progress Note    Subjective: Interval History     Patient with an irritable edge this morning  Patient complains during evaluation  Dismissive  Patient reports that she thinks that she is doing well  Unable to recall any information from her day yesterday  Staff does report the patient was displaying confusion throughout the day  Mood was also labile  Was medication and meal compliant  Slept      Behavior over the last 24 hours:  unchanged  Sleep: normal  Appetite: normal  Medication side effects: No  ROS: no complaints    Current medications:    Current Facility-Administered Medications:     acetaminophen (TYLENOL) tablet 650 mg, 650 mg, Oral, Q6H PRN, Ariel Grier MD, 650 mg at 01/08/21 1118    apixaban (ELIQUIS) tablet 2 5 mg, 2 5 mg, Oral, BID, Chun Montero MD, 2 5 mg at 01/09/21 1714    bismuth subsalicylate (PEPTO BISMOL) oral suspension 524 mg, 524 mg, Oral, Q6H PRN, John Landa MD, 524 mg at 01/06/21 1737    cholecalciferol (VITAMIN D3) tablet 2,000 Units, 2,000 Units, Oral, Daily, Ariel Grier MD, 2,000 Units at 01/09/21 0807    dicyclomine (BENTYL) tablet 20 mg, 20 mg, Oral, 4x Daily (AC & HS), John Landa MD, 20 mg at 01/10/21 7302    donepezil (ARICEPT) tablet 10 mg, 10 mg, Oral, HS, Ariel Grier MD, 10 mg at 59/15/83 7024    folic acid (FOLVITE) tablet 1 mg, 1 mg, Oral, Daily, Chun Montero MD, 1 mg at 01/09/21 0807    haloperidol (HALDOL) tablet 0 5 mg, 0 5 mg, Oral, Q6H PRN, Ariel Grier MD    haloperidol (HALDOL) tablet 1 mg, 1 mg, Oral, Q8H PRN, Ariel Grier MD    haloperidol lactate (HALDOL) injection 2 mg, 2 mg, Intramuscular, Q6H PRN, Ariel Grier MD    hydrOXYzine HCL (ATARAX) tablet 25 mg, 25 mg, Oral, Q6H PRN, Ariel Grier MD    loperamide (IMODIUM) capsule 2 mg, 2 mg, Oral, 4x Daily PRN, Beto Gold MD, 2 mg at 01/07/21 1210    LORazepam (ATIVAN) injection 1 mg, 1 mg, Intramuscular, Q6H PRN, Ariel Grier, MD    LORazepam (ATIVAN) tablet 1 mg, 1 mg, Oral, Q6H PRN, Babak Cook MD    mirtazapine (REMERON) tablet 45 mg, 45 mg, Oral, HS, Jovon Adam MD, 45 mg at 01/09/21 2115    multivitamin-minerals (CENTRUM) tablet 1 tablet, 1 tablet, Oral, Daily, Babak Cook MD, 1 tablet at 01/09/21 0807    nicotine polacrilex (NICORETTE) gum 4 mg, 4 mg, Oral, Q2H PRN, Babak Cook MD    nystatin (MYCOSTATIN) cream, , Topical, BID, Babak Cook MD, Given at 01/07/21 1715    pantoprazole (PROTONIX) EC tablet 20 mg, 20 mg, Oral, Early Morning, Babak Cook MD, 20 mg at 01/10/21 1222    thiamine (VITAMIN B1) tablet 100 mg, 100 mg, Oral, Daily, Desiree Aleman MD, 100 mg at 01/09/21 0807    traZODone (DESYREL) tablet 50 mg, 50 mg, Oral, HS PRN, Babak Cook MD    Current Problem List:    Patient Active Problem List   Diagnosis    Hyponatremia    Alcohol intoxication ([facility] )    COVID-19 virus infection    Suicidal ideation    Alcohol abuse    Multiple falls    History of fall    Depression with suicidal ideation    History of alcohol abuse    Major neurocognitive disorder ([facility] )    Anxiety    Anemia of chronic disease    Irritable bowel syndrome with diarrhea       Problem list reviewed 01/10/21     Objective:     Vital Signs:  Vitals:    01/09/21 0620 01/09/21 1419 01/09/21 2113 01/10/21 0624   BP: 134/68 145/73 148/69 149/65   BP Location: Right arm Right arm Right leg Right arm   Pulse: 91 100 84 88   Resp: 16 17  16   Temp: 97 9 °F (36 6 °C) 98 °F (36 7 °C) 99 °F (37 2 °C) 97 6 °F (36 4 °C)   TempSrc: Temporal Temporal Temporal Temporal   SpO2: 93% 93% 95% 96%   Weight:       Height:             Appearance:  age appropriate and casually dressed   Behavior:  normal   Speech:  normal volume   Mood:  anxious   Affect:  blunted   Thought Process:  normal   Thought Content:  normal   Perceptual Disturbances: None   Risk Potential: none   Sensorium:  person, place and time   Cognition:  intact Consciousness:  alert and awake    Attention: attention span and concentration were age appropriate   Intellect: average   Insight:  limited   Judgment: limited      Motor Activity: no abnormal movements       I/O Past 24 hours:  I/O last 3 completed shifts: In: 1100 [P O :1100]  Out: -   No intake/output data recorded  Labs:  Reviewed 01/10/21    Progress Toward Goals:  Unchanged    Assessment / Plan:     Severe recurrent major depression without psychotic features ([facility] )    Recommended Treatment:      Medication changes:  1) continue current treatment plan    Non-pharmacological treatments  1) Continue with group therapy, milieu therapy and occupational therapy  Safety  1) Safety/communication plan established targeting dynamic risk factors above  2) Risks, benefits, and possible side effects of medications explained to patient and patient verbalizes understanding  Counseling / Coordination of Care    Total floor / unit time spent today 20 minutes  Greater than 50% of total time was spent with the patient and / or family counseling and / or coordination of care  A description of the counseling / coordination of care  Patient's Rights, confidentiality and exceptions to confidentiality, use of automated medical record, [facility] staff access to medical record, and consent to treatment reviewed      Saumya Miller PA-C

## 2021-01-11 PROCEDURE — 99232 SBSQ HOSP IP/OBS MODERATE 35: CPT | Performed by: NURSE PRACTITIONER

## 2021-01-11 RX ADMIN — APIXABAN 2.5 MG: 2.5 TABLET, FILM COATED ORAL at 17:04

## 2021-01-11 RX ADMIN — MIRTAZAPINE 45 MG: 30 TABLET, FILM COATED ORAL at 21:11

## 2021-01-11 RX ADMIN — NYSTATIN 1 APPLICATION: 100000 CREAM TOPICAL at 08:41

## 2021-01-11 RX ADMIN — APIXABAN 2.5 MG: 2.5 TABLET, FILM COATED ORAL at 08:41

## 2021-01-11 RX ADMIN — THIAMINE HCL TAB 100 MG 100 MG: 100 TAB at 08:41

## 2021-01-11 RX ADMIN — DICYCLOMINE HYDROCHLORIDE 20 MG: 20 TABLET ORAL at 21:11

## 2021-01-11 RX ADMIN — Medication 2000 UNITS: at 08:41

## 2021-01-11 RX ADMIN — DONEPEZIL HYDROCHLORIDE 10 MG: 10 TABLET, FILM COATED ORAL at 21:11

## 2021-01-11 RX ADMIN — NYSTATIN 1 APPLICATION: 100000 CREAM TOPICAL at 17:03

## 2021-01-11 RX ADMIN — PANTOPRAZOLE SODIUM 20 MG: 20 TABLET, DELAYED RELEASE ORAL at 06:25

## 2021-01-11 RX ADMIN — Medication 1 TABLET: at 08:41

## 2021-01-11 RX ADMIN — DICYCLOMINE HYDROCHLORIDE 20 MG: 20 TABLET ORAL at 10:31

## 2021-01-11 RX ADMIN — DICYCLOMINE HYDROCHLORIDE 20 MG: 20 TABLET ORAL at 15:00

## 2021-01-11 RX ADMIN — FOLIC ACID 1 MG: 1 TABLET ORAL at 08:41

## 2021-01-11 RX ADMIN — DICYCLOMINE HYDROCHLORIDE 20 MG: 20 TABLET ORAL at 06:25

## 2021-01-11 NOTE — PLAN OF CARE
Problem: Depression  Goal: Treatment Goal: Demonstrate behavioral control of depressive symptoms, verbalize feelings of improved mood/affect, and adopt new coping skills prior to discharge  Outcome: Progressing  Goal: Verbalize thoughts and feelings  Description: Interventions:  - Assess and re-assess patient's level of risk   - Engage patient in 1:1 interactions, daily, for a minimum of 15 minutes   - Encourage patient to express feelings, fears, frustrations, hopes   Outcome: Progressing  Goal: Refrain from harming self  Description: Interventions:  - Monitor patient closely, per order   - Supervise medication ingestion, monitor effects and side effects   Outcome: Progressing  Goal: Refrain from isolation  Description: Interventions:  - Develop a trusting relationship   - Encourage socialization   Outcome: Not Progressing  Goal: Refrain from self-neglect  Outcome: Progressing  Goal: Attend and participate in unit activities, including therapeutic, recreational, and educational groups  Description: Interventions:  - Provide therapeutic and educational activities daily, encourage attendance and participation, and document same in the medical record   Outcome: Progressing  Goal: Complete daily ADLs, including personal hygiene independently, as able  Description: Interventions:  - Observe, teach, and assist patient with ADLS  -  Monitor and promote a balance of rest/activity, with adequate nutrition and elimination   Outcome: Progressing

## 2021-01-11 NOTE — CASE MANAGEMENT
This writer met with patient to complete a family conference call with patient's daughter Anupama Schaefer  This writer discussed with Anupama Freemane and Vickie Brown, her discharge plan  This writer informed them, Aging had denied the application for [facility]  This writer will wait for the denial letter and complete an appeal on behalf of the patient  This writer discussed family will have to seek alternative housing for the patient  Patient states she cannot be alone  This writer discussed how long she could be alone for, she reports "1 hour, that's all I can do"  Patient's daughter reiterated that due to patient's unwillingness to utilize coping skills to deal with her anxiety, patient cannot live with her or the other siblings  Patient became agitated and stated " No one wants me"  This writer once again attempted to explain that her children cannot be with her 24hrs a day and she has to identify some independence  Patient  Continues to state she had 6 siblings and she stayed home with her 4 children  This writer discussed  [place], [address]  This writer provided numbers for [employer] and [address] for [place] to contact and discuss cost and assistance  Anupama Schaefer reports she has an appointment with an Elder  1/20/21 to discuss what service they can provide the family   Anupama Schaefer also discussed the Yanci Rasmussenka will be completed tomorrow with j[facility]

## 2021-01-11 NOTE — PROGRESS NOTES
Pt attended afternoon group  Pt was labile and constricted  Pt did note that she was thinking about supports but did not want to elaborate thoughts in group setting  Will continue to f/u monitor and offer therepuetic support  01/11/21 1330   Activity/Group Checklist   Group Other (Comment)  (community supports and info)   Attendance Attended   Attendance Duration (min) 16-30   Interactions Other (Comment)  (labile)   Affect/Mood Constricted   Goals Achieved Identified feelings; Discussed coping strategies; Able to listen to others; Able to engage in interactions

## 2021-01-11 NOTE — PLAN OF CARE
Problem: Depression  Goal: Treatment Goal: Demonstrate behavioral control of depressive symptoms, verbalize feelings of improved mood/affect, and adopt new coping skills prior to discharge  Outcome: Progressing  Goal: Verbalize thoughts and feelings  Description: Interventions:  - Assess and re-assess patient's level of risk   - Engage patient in 1:1 interactions, daily, for a minimum of 15 minutes   - Encourage patient to express feelings, fears, frustrations, hopes   Outcome: Progressing  Goal: Refrain from harming self  Description: Interventions:  - Monitor patient closely, per order   - Supervise medication ingestion, monitor effects and side effects   Outcome: Progressing  Goal: Refrain from isolation  Description: Interventions:  - Develop a trusting relationship   - Encourage socialization   Outcome: Progressing  Goal: Refrain from self-neglect  Outcome: Progressing  Goal: Attend and participate in unit activities, including therapeutic, recreational, and educational groups  Description: Interventions:  - Provide therapeutic and educational activities daily, encourage attendance and participation, and document same in the medical record   Outcome: Progressing  Goal: Complete daily ADLs, including personal hygiene independently, as able  Description: Interventions:  - Observe, teach, and assist patient with ADLS  -  Monitor and promote a balance of rest/activity, with adequate nutrition and elimination   Outcome: Progressing     Problem: Anxiety  Goal: Anxiety is at manageable level  Description: Interventions:  - Assess and monitor patient's anxiety level  - Monitor for signs and symptoms (heart palpitations, chest pain, shortness of breath, headaches, nausea, feeling jumpy, restlessness, irritable, apprehensive)  - Collaborate with interdisciplinary team and initiate plan and interventions as ordered    - [place] patient to unit/surroundings  - Explain treatment plan  - Encourage participation in care  - Encourage verbalization of concerns/fears  - Identify coping mechanisms  - Assist in developing anxiety-reducing skills  - Administer/offer alternative therapies  - Limit or eliminate stimulants  Outcome: Progressing

## 2021-01-11 NOTE — NURSING NOTE
Patient  preoccupied with her IBS   States '' she is having stomach issues long time ''   Visible on the unit  Med compliant   Will continue to monitor

## 2021-01-11 NOTE — PROGRESS NOTES
Progress Note - Behavioral Health   Mary Hernandez Stump 78 y o  female MRN: [id number]  Unit/Bed#: [facility] 199-50 Encounter: [id number]    The patient was seen for continuing care and reviewed with treatment team   Staff reports the patient was irritable over the weekend  She has been attending groups but does not participate  She has been confused at times  There is a family meeting today to discuss disposition  She is pleasantly confused on approach  She rates her current depression as 4/10 with 10 being the worst and anxiety as 2/4 with 4 being the worst   She said she still wakes up 1 or 2 times during the night but is able to fall back asleep easily  Her appetite is normal   She denies SI currently      Severe recurrent major depression without psychotic features ([facility] )    Vital signs in last 24 hours:  Temp:  [97 4 °F (36 3 °C)-98 1 °F (36 7 °C)] 97 4 °F (36 3 °C)  HR:  [65-88] 65  Resp:  [16-18] 16  BP: (127-161)/(66-74) 161/74    Mental Status Evaluation:    Appearance Adequate hygiene and grooming and Good eye contact   Behavior calm and cooperative   Mood anxious, depressed and improving   Speech Normal rate and volume   Affect blunted   Thought Processes Goal directed and coherent   Thought Content Does not verbalize delusional material   Perceptual Disturbances Denies hallucinations and does not appear to be responding to internal stimuli   Risk Potential Suicidal/Homicidal Ideation - No evidence of suicidal or homicidal ideation and Patient does not verbalize suicidal or homicidal ideation  Risk of Violence - No evidence of risk for violence found on assessment  Risk of Self Mutilation - No evidence of risk for self mutilation found on assessment   Sensorium oriented to person, place and time/date   Cognition/Memory short term memory impaired   Consciousness alert and awake   Attention/Concentration attention span and concentration appear shorter than expected for age   Insight poor   Judgement poor Muscle Strength and Gait/Station normal muscle strength and normal muscle tone, normal gait/station and normal balance   Motor Activity no abnormal movements       Progress Toward Goals:  Progressing      Recommended Treatment: Continue with pharmacotherapy, group therapy, milieu therapy and occupational therapy    The patient will be maintained on the following medications:  Current Facility-Administered Medications   Medication Dose Route Frequency Provider Last Rate    acetaminophen  650 mg Oral Q6H PRN Josefina Hughes MD      apixaban  2 5 mg Oral BID Dede Rosa MD      bismuth subsalicylate  478 mg Oral Q6H PRN Jaycob King MD      cholecalciferol  2,000 Units Oral Daily Josefina Hughes MD      dicyclomine  20 mg Oral 4x Daily (AC & HS) Jaycob King MD      donepezil  10 mg Oral HS Josefina Hughes MD      folic acid  1 mg Oral Daily Dede Rosa MD      haloperidol  0 5 mg Oral Q6H PRN Josefina Hughes MD      haloperidol  1 mg Oral Q8H PRN Josefina Hughes MD      haloperidol lactate  2 mg Intramuscular Q6H PRN Josefina Hughes MD      hydrOXYzine HCL  25 mg Oral Q6H PRN Josefina Hughes MD      loperamide  2 mg Oral 4x Daily PRN Nova Rose MD      LORazepam  1 mg Intramuscular Q6H PRN Josefina Hughes MD      LORazepam  1 mg Oral Q6H PRN Josefina Hughes MD      mirtazapine  45 mg Oral HS Meño Black MD      multivitamin-minerals  1 tablet Oral Daily Josefina Hughes MD      nicotine polacrilex  4 mg Oral Q2H PRN Josefina Hughes MD      nystatin   Topical BID Josefina Hughes MD      pantoprazole  20 mg Oral Early Morning Josefina Hughes MD      thiamine  100 mg Oral Daily Dede Rosa MD      traZODone  50 mg Oral HS PRN Josefina Hughes MD         Severe recurrent major depression without psychotic features ([facility] )

## 2021-01-11 NOTE — NURSING NOTE
Patient is medication and meal compliant  No complaints of pain or discomfort  Patient reports depression at 2/4 and anxiety at 2/4  No PRN medication requested or needed this shift  Patient is calm and cooperative with staff  Patient is isolative to self and does not interact with peers or roommate  Patient denies suicidal ideation and no attempts at self harm noted this shift  Will continue to monitor patient for changes in mood or behavior

## 2021-01-12 PROCEDURE — 97167 OT EVAL HIGH COMPLEX 60 MIN: CPT

## 2021-01-12 PROCEDURE — 99233 SBSQ HOSP IP/OBS HIGH 50: CPT | Performed by: NURSE PRACTITIONER

## 2021-01-12 RX ADMIN — NYSTATIN: 100000 CREAM TOPICAL at 17:04

## 2021-01-12 RX ADMIN — THIAMINE HCL TAB 100 MG 100 MG: 100 TAB at 08:16

## 2021-01-12 RX ADMIN — DICYCLOMINE HYDROCHLORIDE 20 MG: 20 TABLET ORAL at 11:37

## 2021-01-12 RX ADMIN — FOLIC ACID 1 MG: 1 TABLET ORAL at 08:16

## 2021-01-12 RX ADMIN — APIXABAN 2.5 MG: 2.5 TABLET, FILM COATED ORAL at 17:04

## 2021-01-12 RX ADMIN — MIRTAZAPINE 45 MG: 30 TABLET, FILM COATED ORAL at 21:23

## 2021-01-12 RX ADMIN — DICYCLOMINE HYDROCHLORIDE 20 MG: 20 TABLET ORAL at 06:04

## 2021-01-12 RX ADMIN — NYSTATIN: 100000 CREAM TOPICAL at 08:18

## 2021-01-12 RX ADMIN — Medication 2000 UNITS: at 08:16

## 2021-01-12 RX ADMIN — DICYCLOMINE HYDROCHLORIDE 20 MG: 20 TABLET ORAL at 21:23

## 2021-01-12 RX ADMIN — DICYCLOMINE HYDROCHLORIDE 20 MG: 20 TABLET ORAL at 17:03

## 2021-01-12 RX ADMIN — DONEPEZIL HYDROCHLORIDE 10 MG: 10 TABLET, FILM COATED ORAL at 21:23

## 2021-01-12 RX ADMIN — APIXABAN 2.5 MG: 2.5 TABLET, FILM COATED ORAL at 08:16

## 2021-01-12 RX ADMIN — Medication 1 TABLET: at 08:16

## 2021-01-12 RX ADMIN — PANTOPRAZOLE SODIUM 20 MG: 20 TABLET, DELAYED RELEASE ORAL at 06:04

## 2021-01-12 NOTE — PROGRESS NOTES
01/12/21 0928   Team Meeting   Meeting Type Daily Rounds   Team Members Present   Team Members Present Physician;Nurse;;   Physician Team Member [id number] Team Member [address] [facility] Team Member [place] [id number]   Social Work Team Member EUSEBIO   Patient/Family Present   Patient Present No   Patient's Family Present No     Patient reports depression and anxiety  Patient slept well, she is compliant with medications  Family meeting completed yesterday regarding discharge planning for patient, fractured family support

## 2021-01-12 NOTE — PLAN OF CARE
Problem: Depression  Goal: Verbalize thoughts and feelings  Description: Interventions:  - Assess and re-assess patient's level of risk   - Engage patient in 1:1 interactions, daily, for a minimum of 15 minutes   - Encourage patient to express feelings, fears, frustrations, hopes   Outcome: Progressing  Goal: Refrain from isolation  Description: Interventions:  - Develop a trusting relationship   - Encourage socialization   Outcome: Progressing

## 2021-01-12 NOTE — NURSING NOTE
Pt reported good sleep last night  Pt denies any depression or anxiety,denies any SI/HI/AH/VH  Med compliant  No complaints at this time  Frogetful at times,easily redirected  Will continue to monitor closely and provide support

## 2021-01-12 NOTE — PROGRESS NOTES
Progress Note - Behavioral Health   Spero Setting Stump 78 y o  female MRN: [id number]  Unit/Bed#: Seth Laguerre 711-11 Encounter: [id number]    The patient was seen for continuing care and reviewed with treatment team   Staff notes that the patient tends to be isolative to self with minimal interaction with peers  She has been attending groups but is minimally interactive during them  Placement is pending  A cognitive OT eval has been ordered and is pending  She is somewhat irritable and more guarded on approach today  She says her mood is okay but did not answer questions concerning depression or anxiety ratings  She does have suicidal thoughts once in a while to go be with her  but denies any intent or plan  Appetite has been normal   She says she is sleeping well       Severe recurrent major depression without psychotic features ([facility] )    Vital signs in last 24 hours:  Temp:  [97 6 °F (36 4 °C)-98 5 °F (36 9 °C)] 97 8 °F (36 6 °C)  HR:  [83-98] 83  Resp:  [16-18] 16  BP: (111-136)/(57-74) 133/74    Mental Status Evaluation:    Appearance Adequate hygiene and grooming   Behavior guarded   Mood irritable   Speech Sparse   Affect constricted   Thought Processes Goal directed and coherent   Thought Content Does not verbalize delusional material   Perceptual Disturbances Denies hallucinations and does not appear to be responding to internal stimuli   Risk Potential Suicidal/Homicidal Ideation - Suicidal Ideations without plan  Risk of Violence - No evidence of risk for violence found on assessment  Risk of Self Mutilation - No evidence of risk for self mutilation found on assessment   Sensorium oriented to person and place   Cognition/Memory short term memory impaired   Consciousness alert and awake   Attention/Concentration attention span and concentration appear shorter than expected for age   Insight limited   Judgement limited   Muscle Strength and Gait/Station normal muscle strength and normal muscle tone, normal gait/station and normal balance   Motor Activity no abnormal movements       Progress Toward Goals:  Placement pending      Recommended Treatment: Continue with pharmacotherapy, group therapy, milieu therapy and occupational therapy    The patient will be maintained on the following medications:  Current Facility-Administered Medications   Medication Dose Route Frequency Provider Last Rate    acetaminophen  650 mg Oral Q6H PRN Juan Martin MD      apixaban  2 5 mg Oral BID Nathalie Amador MD      bismuth subsalicylate  724 mg Oral Q6H PRN Haley Lazar MD      cholecalciferol  2,000 Units Oral Daily Juan Martin MD      dicyclomine  20 mg Oral 4x Daily (AC & HS) Haley Lazar MD      donepezil  10 mg Oral HS Juan Martin MD      folic acid  1 mg Oral Daily Nathalie Amador MD      haloperidol  0 5 mg Oral Q6H PRN Juan Martin MD      haloperidol  1 mg Oral Q8H PRN Juan Martin MD      haloperidol lactate  2 mg Intramuscular Q6H PRN Juan Martin MD      hydrOXYzine HCL  25 mg Oral Q6H PRN Juan Martin MD      loperamide  2 mg Oral 4x Daily PRN Du Brooks MD      LORazepam  1 mg Intramuscular Q6H PRN Juan Martin MD      LORazepam  1 mg Oral Q6H PRN Juan Martin MD      mirtazapine  45 mg Oral HS Jesse Disla MD      multivitamin-minerals  1 tablet Oral Daily Juan Martin MD      nicotine polacrilex  4 mg Oral Q2H PRN Juan Martin MD      nystatin   Topical BID Juan Martin MD      pantoprazole  20 mg Oral Early Morning Juan Martin MD      thiamine  100 mg Oral Daily Nathalie Amador MD      traZODone  50 mg Oral HS PRN Juan Martin MD         Severe recurrent major depression without psychotic features ([facility] )

## 2021-01-12 NOTE — PROGRESS NOTES
Pt attended groups and social   Pt confusion at times, calm and pleasant  01/12/21 1330   Activity/Group Checklist   Group Other (Comment)  (positive reflection)   Attendance Attended   Attendance Duration (min) 0-15   Interactions Interacted appropriately   Affect/Mood Calm   Goals Achieved Able to listen to others; Able to engage in interactions

## 2021-01-12 NOTE — PLAN OF CARE
Problem: OCCUPATIONAL THERAPY ADULT  Goal: Performs self-care activities at highest level of function for planned discharge setting  See evaluation for individualized goals  Description: Treatment Interventions: ADL retraining, Endurance training, Cognitive reorientation, Activityengagement, Continued evaluation          See flowsheet documentation for full assessment, interventions and recommendations  Note: Limitation: Decreased cognition, Decreased high-level ADLs  Prognosis: Fair  Assessment: Pt is a 78 y o  female seen for OT evaluation s/p admit to [facility] on 12/19/2020 w/ Severe recurrent major depression without psychotic features ([facility] [address] ), recent COVID diagnosis 12/24  See above for extensive list of comorbidities affecting Pt's functional performance at time of assessment  Personal factors affecting Pt at time of IE include:limited home support, behavioral pattern, difficulty performing ADLS, difficulty performing IADLS , health management  and environment  Prior to admission, Pt was living alone at home  Upon evaluation: Pt guarded, irritable at times however able to be redirected  Pt with difficulty describing PLOF, states "I eat whatever I want" but unable to state whether this is primarily take-out or self preparation from [facility] level  Pt unable to state the year or how long she has been in the hospital   Pt frequently returning to the loss of her spouse, "I just can't live alone "  Pt with noted poor engagement and unable to identify leisure activities at home  Functionally, Pt able to complete ADLs including dressing, toileting, and grooming at the sink with supervision for safety  Pt completing all mobility Samantha  The following deficits impact occupational performance: decreased balance, impaired memory, impaired sequencing, impaired problem solving, decreased safety awareness, impaired interpersonal skills and decreased coping skills   Pt to benefit from continued skilled OT services while in the hospital to address deficits as defined above and maximize level of functional independence w ADL's and functional mobility  Occupational performance areas to address include: health maintenance, functional mobility, community mobility, clothing management and social participation  From OT standpoint, recommendation at time of d/c would be that the person live with someone who does a daily check on the environment to remove any safety hazards and solve problems when minor changes in the home occur  The person may be alone for part of the day with a pre-established procedure for getting help from a neighbor  If this is not available for home, rec alternative placement        OT Discharge Recommendation: Other (Comment)

## 2021-01-12 NOTE — OCCUPATIONAL THERAPY NOTE
Occupational Therapy Evaluation     Patient Name: Sebastian Guy Stump  Today's Date: 1/12/2021  Problem List  Principal Problem:    Depression with suicidal ideation  Active Problems:    COVID-19 virus infection    Suicidal ideation    History of fall    History of alcohol abuse    Major neurocognitive disorder ([address] )    Anxiety    Anemia of chronic disease    Irritable bowel syndrome with diarrhea    Past Medical History  Past Medical History:   Diagnosis Date    Hypertension     IBS (irritable bowel syndrome)      Past Surgical History  No past surgical history on file  01/12/21 0935   OT Last Visit   OT Visit Date 01/12/21   Note Type   Note type Evaluation   Restrictions/Precautions   Weight Bearing Precautions Per Order No   Other Precautions Cognitive   Pain Assessment   Pain Assessment Tool Pain Assessment not indicated - pt denies pain   Home Living   Type of [address] Multi-level;Bed/bath upstairs;Stairs to enter with rails   Bathroom Shower/Tub Tub/shower unit   [employer] Standard   Prior Function   Level of [place] Independent with ADLs and functional mobility   Lives With Alone   Receives Help From Family   ADL Assistance Independent   IADLs Needs assistance   Falls in the last 6 months 0   Comments Pt reports that her spouse passed away a couple weeks ago  Pt has since been living at home alone and having increased difficulty caring for self  Pt does not drive and her spouse had been assisting her with IADLs  Subjective   Subjective "I just can't live alone anymore"   ADL   Eating Assistance 6  Modified independent   Grooming Assistance 6  Modified Independent   Grooming Deficit Wash/dry hands; Wash/dry face   UB Bathing Assistance 6  Modified Independent   LB Bathing Assistance 6  Modified Independent   UB Dressing Assistance 6  Modified independent   LB Dressing Assistance 5  Supervision/Setup   Toileting Assistance  5  Supervision/Setup   Transfers   Sit to Stand 5 Supervision   Stand to Sit 5  Supervision   Toilet transfer 5  Supervision   Functional Mobility   Functional Mobility 5  Supervision   Additional Comments around unit   Additional items   (no AD)   Balance   Static Sitting Good   Dynamic Sitting Fair +   Static Standing Fair   Dynamic Standing Fair   Ambulatory Fair   Activity Tolerance   Activity Tolerance Patient tolerated treatment well   RUE Assessment   RUE Assessment WFL   LUE Assessment   LUE Assessment WFL   Cognition   Arousal/Participation Alert; Cooperative   Attention Within functional limits   Orientation Level Oriented to person;Disoriented to time;Disoriented to place   Memory Decreased short term memory;Decreased recall of recent events   Following Commands Follows one step commands with increased time or repetition   Cognition Assessment Tools ACLS   Score 4 4    4 4    Pt scored 4 4/6 0 indicating it is recommended that the person live with someone who does a daily check on the environment to remove any safety hazards and solve problems when minor changes in the home occur  The person may be alone for part of the day with a pre-established procedure for getting help from a neighbor  Behavior:  Knows day/date  Follows routine sequence of activities  Will learn schedule and follow daily routine  Hazards are not anticipated  Memorization is slow  Will need long term repetitive training for all new tasks  May become upset if routine is altered  May seek assist if lost   Do not expect to be aware of needs of others  May need reminders to keep appointments  Grooming:  Scott, brushes and styles hair  Applies makeup  May insist on familiar products  Finds supplies in familiar locations  May be unable to master use of new tools  Hazards are not anticipated  Dressing: Finds garments in familiar locations  Initiates dressing at usual times  Selects familiar combinations of outfits and may wear same outfit over and over    Resists new combinations  May fail to consider weather conditions, season or occasion when selecting clothing  May argue with suggested corrections of errors  Bathing:  Initiates bath/shower at customary times and follows typical routine  May collect supplies from a familiar location  May not vary rate  May want to use same products  May use excessive amounts of product  May have difficulty opening small or unusual containers  May leave towels on floor  Protect from unseen hazards (wet floors, electrical appliances near water)  Toileting: Follows a routine of toileting in a familiar environment  Needs to have public rest rooms pointed out  May use too much paper  Does not consider needs of others  May spend excessive time in rest room  Medications: May follow routine rigidly and resist changes in prescriptions based on erroneous beliefs of effects  Does not note adverse side effects  Does not anticipate need to renew prescriptions  May be able to open child proof containers  Can use DAILY pill box marked with day/time (filled by another person)  Food preparation:  May follow a fixed diet and go hungry if usual food items are not available  Does not check inventory and may run out of essentials frequently  Does not consider nutritional needs  Goes to familiar restaurants of grocery stores  Is able to prepare simple hot/cold dishes  Spending money:  Manages routine purchases for immediate needs  May count cash very slowly  May use credit cards or checks  May need assistance for making monthly budget  May not remember to account for taxes or tips  Can manage a daily allowance  Telephone:  Writes down messages and new numbers slowly  May attempt to use an address book  May make calls at odd hours without consideration of others schedule or cost of call  May run up large telephone bills  Driving: Should NOT operate a motor vehicle             Assessment   Limitation Decreased cognition;Decreased high-level ADLs   Prognosis Fair   Assessment   Pt is a 78 y o  female seen for OT evaluation s/p admit to [facility] on 12/19/2020 w/ Severe recurrent major depression without psychotic features ([facility] [address] ), recent COVID diagnosis 12/24  See above for extensive list of comorbidities affecting Pt's functional performance at time of assessment  Personal factors affecting Pt at time of IE include:limited home support, behavioral pattern, difficulty performing ADLS, difficulty performing IADLS , health management  and environment  Prior to admission, Pt was living alone at home  Upon evaluation: Pt guarded, irritable at times however able to be redirected  Pt with difficulty describing PLOF, states "I eat whatever I want" but unable to state whether this is primarily take-out or self preparation from [facility] level  Pt unable to state the year or how long she has been in the hospital   Pt frequently returning to the loss of her spouse, "I just can't live alone "  Pt with noted poor engagement and unable to identify leisure activities at home  Functionally, Pt able to complete ADLs including dressing, toileting, and grooming at the sink with supervision for safety  Pt completing all mobility Samantha  The following deficits impact occupational performance: decreased balance, impaired memory, impaired sequencing, impaired problem solving, decreased safety awareness, impaired interpersonal skills and decreased coping skills  Pt to benefit from continued skilled OT services while in the hospital to address deficits as defined above and maximize level of functional independence w ADL's and functional mobility  Occupational performance areas to address include: health maintenance, functional mobility, community mobility, clothing management and social participation   From OT standpoint, recommendation at time of d/c would be that the person live with someone who does a daily check on the environment to remove any safety hazards and solve problems when minor changes in the home occur  The person may be alone for part of the day with a pre-established procedure for getting help from a neighbor  If this is not available for home, rec alternative placement  Goals   STG Time Frame   (2 weeks)   Short Term Goals 1  Pt will identify 1-3 positive coping strategies  2  Pt will complete 1-step IADL with supervision  3  Pt will complete toileting Samantha  Plan   Treatment Interventions ADL retraining; Endurance training;Cognitive reorientation; Activityengagement;Continued evaluation   Goal Expiration Date 01/26/21   OT Frequency 1-2x/wk   Recommendation   OT Discharge Recommendation Other (Comment)  Recommend that the person live with someone who does a daily check on the environment to remove any safety hazards and solve problems when minor changes in the home occur  The person may be alone for part of the day with a pre-established procedure for getting help from a neighbor  If this is not available for home, rec alternative placement

## 2021-01-13 PROCEDURE — 99232 SBSQ HOSP IP/OBS MODERATE 35: CPT | Performed by: NURSE PRACTITIONER

## 2021-01-13 RX ADMIN — DICYCLOMINE HYDROCHLORIDE 20 MG: 20 TABLET ORAL at 11:58

## 2021-01-13 RX ADMIN — DICYCLOMINE HYDROCHLORIDE 20 MG: 20 TABLET ORAL at 17:00

## 2021-01-13 RX ADMIN — DONEPEZIL HYDROCHLORIDE 10 MG: 10 TABLET, FILM COATED ORAL at 21:32

## 2021-01-13 RX ADMIN — DICYCLOMINE HYDROCHLORIDE 20 MG: 20 TABLET ORAL at 06:06

## 2021-01-13 RX ADMIN — Medication 1 TABLET: at 08:03

## 2021-01-13 RX ADMIN — NYSTATIN: 100000 CREAM TOPICAL at 08:03

## 2021-01-13 RX ADMIN — MIRTAZAPINE 45 MG: 30 TABLET, FILM COATED ORAL at 21:32

## 2021-01-13 RX ADMIN — APIXABAN 2.5 MG: 2.5 TABLET, FILM COATED ORAL at 08:03

## 2021-01-13 RX ADMIN — DICYCLOMINE HYDROCHLORIDE 20 MG: 20 TABLET ORAL at 21:32

## 2021-01-13 RX ADMIN — Medication 2000 UNITS: at 08:03

## 2021-01-13 RX ADMIN — THIAMINE HCL TAB 100 MG 100 MG: 100 TAB at 08:03

## 2021-01-13 RX ADMIN — FOLIC ACID 1 MG: 1 TABLET ORAL at 08:03

## 2021-01-13 RX ADMIN — APIXABAN 2.5 MG: 2.5 TABLET, FILM COATED ORAL at 17:06

## 2021-01-13 RX ADMIN — PANTOPRAZOLE SODIUM 20 MG: 20 TABLET, DELAYED RELEASE ORAL at 06:06

## 2021-01-13 NOTE — PROGRESS NOTES
Progress Note - Behavioral Health   Eula Tapia Stump 78 y o  female MRN: [id number]  Unit/Bed#: Rina Hong 398-27 Encounter: [id number]    The patient was seen for continuing care and reviewed with treatment team   Staff notes the patient has been more pleasant and visible  She remains forgetful but is easily redirectable  She has been medication and meal compliant  She had her cognitive evaluation yesterday  Disposition is pending  She is brighter and no longer irritable this morning  She states she is in a good mood and she slept very well  She rates her current depression as 2/10 with 10 being the worst and anxiety as 1/4 with 4 being the worst   Her appetite has been normal   She denies medication side effects  Regarding suicidal ideation she states not much anymore and denies any currently      Severe recurrent major depression without psychotic features ([facility] [address] )    Vital signs in last 24 hours:  Temp:  [97 2 °F (36 2 °C)-98 °F (36 7 °C)] 97 3 °F (36 3 °C)  HR:  [81-85] 82  Resp:  [16-18] 18  BP: (133-165)/(65-92) 140/71    Mental Status Evaluation:    Appearance Adequate hygiene and grooming and Good eye contact   Behavior calm and cooperative   Mood improving   Speech Normal rate and volume   Affect appropriate   Thought Processes Goal directed and coherent   Thought Content Does not verbalize delusional material   Perceptual Disturbances Denies hallucinations and does not appear to be responding to internal stimuli   Risk Potential Suicidal/Homicidal Ideation - No evidence of suicidal or homicidal ideation and Patient does not verbalize suicidal or homicidal ideation  Risk of Violence - No evidence of risk for violence found on assessment  Risk of Self Mutilation - No evidence of risk for self mutilation found on assessment   Sensorium oriented to person and place   Cognition/Memory short term memory impaired   Consciousness alert and awake   Attention/Concentration attention span and concentration appear shorter than expected for age   Insight limited   Judgement limited   Muscle Strength and Gait/Station normal muscle strength and normal muscle tone, normal gait/station and normal balance   Motor Activity no abnormal movements       Progress Toward Goals:  Stable      Recommended Treatment: Continue with pharmacotherapy, group therapy, milieu therapy and occupational therapy    The patient will be maintained on the following medications:  Current Facility-Administered Medications   Medication Dose Route Frequency Provider Last Rate    acetaminophen  650 mg Oral Q6H PRN Ina Simmons MD      apixaban  2 5 mg Oral BID Jose Maria Dunne MD      bismuth subsalicylate  914 mg Oral Q6H PRN Jeison Maloney MD      cholecalciferol  2,000 Units Oral Daily Ina Simmons MD      dicyclomine  20 mg Oral 4x Daily (AC & HS) Jeison Maloney MD      donepezil  10 mg Oral HS Ina Simmons MD      folic acid  1 mg Oral Daily Jose Maria Dunne MD      haloperidol  0 5 mg Oral Q6H PRN Ina Simmons MD      haloperidol  1 mg Oral Q8H PRN Ina Simmons MD      haloperidol lactate  2 mg Intramuscular Q6H PRN Ina Simmons MD      hydrOXYzine HCL  25 mg Oral Q6H PRN Ina Simmons MD      loperamide  2 mg Oral 4x Daily PRN Gisel Dale MD      LORazepam  1 mg Intramuscular Q6H PRN Ina Simmons MD      LORazepam  1 mg Oral Q6H PRN Ina Simmons MD      mirtazapine  45 mg Oral HS Alma Lundberg MD      multivitamin-minerals  1 tablet Oral Daily Ina Simmons MD      nicotine polacrilex  4 mg Oral Q2H PRN Ina Simmons MD      nystatin   Topical BID Ina Simmons MD      pantoprazole  20 mg Oral Early Morning Ina Simmons MD      thiamine  100 mg Oral Daily Jose Maria Dunne MD      traZODone  50 mg Oral HS PRN Ina Simmons MD         Severe recurrent major depression without psychotic features ([facility] [address] )

## 2021-01-13 NOTE — PROGRESS NOTES
Pt attended all groups and activities,  Pt repetitive with comments and focus  Pt pleasant and cooperative  01/13/21 1000   Activity/Group Checklist   Group Other (Comment)  (short term problem solving)   Attendance Attended   Attendance Duration (min) 16-30   Interactions Other (Comment)  (repetitive )   Affect/Mood Calm   Goals Achieved Identified feelings; Discussed coping strategies; Able to listen to others; Able to engage in interactions

## 2021-01-13 NOTE — NURSING NOTE
CM reported that pt got upset and angry when was signing POA paperwork with daughter and Billy Hannah and making treats to kill her self  Pt was assessed when back on the unit,she denied any issues,denies any thoughts of self harm  Pt went and attended the last group and ate her snack  Staff was made aware for close monitoring,oncoming RN made aware

## 2021-01-13 NOTE — NURSING NOTE
Patient is pleasant and cooperative with the care and medications  Patient brightens on approach and is visible during the meals otherwise seclusive in her room  Depression rated 2/10 and anxiety 1/4   Denies SI

## 2021-01-13 NOTE — NURSING NOTE
Pt reported good sleep,denies any depression or anxiety this morning  Pt denies any SI/HI/AH/VH  Pleasant and cooperative with staff,was assisted with her shower this morning  Frogetful at times but easily redirected  Will continue to monitor closely and provide support

## 2021-01-13 NOTE — PLAN OF CARE
Problem: Risk for Self Injury/Neglect  Goal: Treatment Goal: Remain safe during length of stay, learn and adopt new coping skills, and be free of self-injurious ideation, impulses and acts at the time of discharge  Outcome: Progressing     Problem: Depression  Goal: Treatment Goal: Demonstrate behavioral control of depressive symptoms, verbalize feelings of improved mood/affect, and adopt new coping skills prior to discharge  Outcome: Progressing  Goal: Verbalize thoughts and feelings  Description: Interventions:  - Assess and re-assess patient's level of risk   - Engage patient in 1:1 interactions, daily, for a minimum of 15 minutes   - Encourage patient to express feelings, fears, frustrations, hopes   Outcome: Progressing  Goal: Refrain from harming self  Description: Interventions:  - Monitor patient closely, per order   - Supervise medication ingestion, monitor effects and side effects   Outcome: Progressing  Goal: Refrain from isolation  Description: Interventions:  - Develop a trusting relationship   - Encourage socialization   Outcome: Progressing  Goal: Refrain from self-neglect  Outcome: Progressing  Goal: Attend and participate in unit activities, including therapeutic, recreational, and educational groups  Description: Interventions:  - Provide therapeutic and educational activities daily, encourage attendance and participation, and document same in the medical record   Outcome: Progressing  Goal: Complete daily ADLs, including personal hygiene independently, as able  Description: Interventions:  - Observe, teach, and assist patient with ADLS  -  Monitor and promote a balance of rest/activity, with adequate nutrition and elimination   Outcome: Progressing     Problem: Anxiety  Goal: Anxiety is at manageable level  Description: Interventions:  - Assess and monitor patient's anxiety level     - Monitor for signs and symptoms (heart palpitations, chest pain, shortness of breath, headaches, nausea, feeling jumpy, restlessness, irritable, apprehensive)  - Collaborate with interdisciplinary team and initiate plan and interventions as ordered    - [place] patient to unit/surroundings  - Explain treatment plan  - Encourage participation in care  - Encourage verbalization of concerns/fears  - Identify coping mechanisms  - Assist in developing anxiety-reducing skills  - Administer/offer alternative therapies  - Limit or eliminate stimulants  Outcome: Progressing     Problem: DISCHARGE PLANNING  Goal: Discharge to home or other facility with appropriate resources  Description: INTERVENTIONS:  - Identify barriers to discharge w/patient and caregiver  - Arrange for needed discharge resources and transportation as appropriate  - Identify discharge learning needs (meds, wound care, etc )  - Refer to Case Management Department for coordinating discharge planning if the patient needs post-hospital services based on physician/advanced practitioner order or complex needs related to functional status, cognitive ability, or social support system  Outcome: Progressing     Problem: Ineffective Coping  Goal: Participates in unit activities  Description: Interventions:  - Provide therapeutic environment   - Provide required programming   - Redirect inappropriate behaviors   Outcome: Progressing

## 2021-01-13 NOTE — NURSING NOTE
Patient is in and out of her room  She ambulates with a steady gait to watch tv; socialize with select peers She is forgetful at times requiring reminders and redirection   She is pleasant and cooperative  She said depression/anxiety are "not bad" and did not rate

## 2021-01-14 PROCEDURE — 99232 SBSQ HOSP IP/OBS MODERATE 35: CPT | Performed by: NURSE PRACTITIONER

## 2021-01-14 RX ADMIN — Medication 2000 UNITS: at 08:20

## 2021-01-14 RX ADMIN — DICYCLOMINE HYDROCHLORIDE 20 MG: 20 TABLET ORAL at 11:39

## 2021-01-14 RX ADMIN — APIXABAN 2.5 MG: 2.5 TABLET, FILM COATED ORAL at 17:26

## 2021-01-14 RX ADMIN — DICYCLOMINE HYDROCHLORIDE 20 MG: 20 TABLET ORAL at 17:26

## 2021-01-14 RX ADMIN — PANTOPRAZOLE SODIUM 20 MG: 20 TABLET, DELAYED RELEASE ORAL at 06:00

## 2021-01-14 RX ADMIN — Medication 1 TABLET: at 08:20

## 2021-01-14 RX ADMIN — NYSTATIN 1 APPLICATION: 100000 CREAM TOPICAL at 08:21

## 2021-01-14 RX ADMIN — DICYCLOMINE HYDROCHLORIDE 20 MG: 20 TABLET ORAL at 21:37

## 2021-01-14 RX ADMIN — DICYCLOMINE HYDROCHLORIDE 20 MG: 20 TABLET ORAL at 06:00

## 2021-01-14 RX ADMIN — FOLIC ACID 1 MG: 1 TABLET ORAL at 08:20

## 2021-01-14 RX ADMIN — DONEPEZIL HYDROCHLORIDE 10 MG: 10 TABLET, FILM COATED ORAL at 21:37

## 2021-01-14 RX ADMIN — THIAMINE HCL TAB 100 MG 100 MG: 100 TAB at 08:20

## 2021-01-14 RX ADMIN — APIXABAN 2.5 MG: 2.5 TABLET, FILM COATED ORAL at 08:20

## 2021-01-14 RX ADMIN — MIRTAZAPINE 45 MG: 30 TABLET, FILM COATED ORAL at 21:37

## 2021-01-14 NOTE — PROGRESS NOTES
Progress Note - Behavioral Health   Alexa Jerome 78 y o  female MRN: [id number]  Unit/Bed#: Natalia Lucas 284-16 Encounter: [id number]    The patient was seen for continuing care and reviewed with treatment team   She is pleasant on approach  She says her mood is okay and better  She rates her current depression is 4/10 with 10 being the worst and anxiety as 2/4 with 4 being the worst   Overall her sleep has been good but she did say that last night she had some difficulty sleeping  Appetite has been normal   Regarding suicidal ideation she denies currently but states I do not know how it will be when I leave"  She also stated that she has been making suicidal statements at times because of her  dying  She states I know I won't do it though"       Severe recurrent major depression without psychotic features ([facility] )    Vital signs in last 24 hours:  Temp:  [97 °F (36 1 °C)-98 °F (36 7 °C)] 97 °F (36 1 °C)  HR:  [85-91] 85  Resp:  [16-18] 16  BP: (145-167)/(65-81) 148/65    Mental Status Evaluation:    Appearance Adequate hygiene and grooming and Good eye contact   Behavior calm and cooperative   Mood anxious, depressed and improving   Speech Normal rate and volume   Affect blunted   Thought Processes Goal directed and coherent   Thought Content Does not verbalize delusional material   Perceptual Disturbances Denies hallucinations and does not appear to be responding to internal stimuli   Risk Potential Suicidal/Homicidal Ideation - No evidence of suicidal or homicidal ideation  Risk of Violence - No evidence of risk for violence found on assessment  Risk of Self Mutilation - No evidence of risk for self mutilation found on assessment   Sensorium oriented to person, place and time/date   Cognition/Memory recent and remote memory grossly intact   Consciousness alert and awake   Attention/Concentration attention span and concentration appear shorter than expected for age   Insight limited   Judgement limited Muscle Strength and Gait/Station normal muscle strength and normal muscle tone, normal gait/station and normal balance   Motor Activity no abnormal movements       Progress Toward Goals:  Placement pending      Recommended Treatment: Continue with pharmacotherapy, group therapy, milieu therapy and occupational therapy    The patient will be maintained on the following medications:  Current Facility-Administered Medications   Medication Dose Route Frequency Provider Last Rate    acetaminophen  650 mg Oral Q6H PRN Jcarlos Parham MD      apixaban  2 5 mg Oral BID Nadia Ramirez MD      bismuth subsalicylate  459 mg Oral Q6H PRN Adam Alexander MD      cholecalciferol  2,000 Units Oral Daily Jcarlos Parham MD      dicyclomine  20 mg Oral 4x Daily (AC & HS) Adam Alexander MD      donepezil  10 mg Oral HS Jcarlos Parham MD      folic acid  1 mg Oral Daily Nadia Ramirez MD      haloperidol  0 5 mg Oral Q6H PRN Jcarlos Parham MD      haloperidol  1 mg Oral Q8H PRN Jcarlos Parham MD      haloperidol lactate  2 mg Intramuscular Q6H PRN Jcarlos Parham MD      hydrOXYzine HCL  25 mg Oral Q6H PRN Jcarlos Parham MD      loperamide  2 mg Oral 4x Daily PRN Marion Steinberg MD      LORazepam  1 mg Intramuscular Q6H PRN Jcarlos Parham MD      LORazepam  1 mg Oral Q6H PRN Jcarlos Parham MD      mirtazapine  45 mg Oral HS Jennifer Ramirez MD      multivitamin-minerals  1 tablet Oral Daily Jcarlos Parham MD      nicotine polacrilex  4 mg Oral Q2H PRN Jcarlos Parham MD      nystatin   Topical BID Jcarlos Parham MD      pantoprazole  20 mg Oral Early Morning Jcarlos Parham MD      thiamine  100 mg Oral Daily Nadia Ramirez MD      traZODone  50 mg Oral HS PRN Jcarlos Parham MD         Severe recurrent major depression without psychotic features ([facility] [address] )

## 2021-01-14 NOTE — PROGRESS NOTES
01/14/21 0930   Team Meeting   Meeting Type Daily Rounds   Team Members Present   Team Members Present Physician;Nurse;;   Physician Team Member [facility]   Nursing Team Member [id number] Team Member [id number]   Social Work Team Member Martin Wilcox   Patient/Family Present   Patient Present No   Patient's Family Present No     Patient is forgetful at times,she denies SI  She is anxious and depressed  She attended groups, decreased sleep

## 2021-01-14 NOTE — SOCIAL WORK
Pt attended groups     Pt visible on unit with prompting  Pt making slow and steady progress on mh recovery needs

## 2021-01-14 NOTE — PROGRESS NOTES
Pt attended [facility] relapse prevention plan group  Pt was cooperative and self expressed  Pt signed  relapse plan and copy in chart  Warmline and crisis phone numbers provided  Pt noted that signs upon admission were anxiety, depression, losing sleep and afraid to be alone  Pt attends groups and visible  01/14/21 1000   Activity/Group Checklist   Group Other (Comment)  (  relapse prevention plan)   Attendance Attended   Attendance Duration (min) 16-30   Interactions Interacted appropriately   Affect/Mood Appropriate   Goals Achieved Identified feelings; Discussed coping strategies; Able to listen to others; Able to engage in interactions; Able to reflect/comment on own behavior

## 2021-01-14 NOTE — NURSING NOTE
Awake and visible on the unit  Pt reported that she didn't sleep well last night and was feeling tired today  When asked her if she was feeling depressed or anxious,stated " I don't know"  Pt denies any SI/HI/AH/VH  Med compliant  No distress noted  Will continue to monitor

## 2021-01-14 NOTE — TREATMENT PLAN
TREATMENT PLAN REVIEW - Yvonne Holden J Stump 78 y o  1940 female MRN: [id number]    [address] [address] [address] [facility] Room / Bed: Dioni [facility]12 Encounter: [id number]          Admit Date/Time:  12/19/2020  5:54 PM    Treatment Team: Attending Provider: Ina Simmons MD; : Trudi Morales; Care Coordinator: Bard Moore; Nurse Practitioner: MARC Slade; Patient Care Assistant: Mariama Rivera; Medical Student: Valerie Cantu; Patient Care Assistant: Krzysztof Abel; Registered Nurse: Srikanth Davis RN; Patient Care Assistant: Alessandra Knight;  Occupational Therapy Assistant: Fer Park; Nurse Manager: Christelle Bond RN    Diagnosis: Principal Problem:    Depression with suicidal ideation  Active Problems:    COVID-19 virus infection    Suicidal ideation    History of fall    History of alcohol abuse    Major neurocognitive disorder ([facility] [address] )    Anxiety    Anemia of chronic disease    Irritable bowel syndrome with diarrhea      Patient Strengths/Assets: ability for insight, cooperative, communication skills, compliant with medication    Patient Barriers/Limitations: difficulty adapting    Short Term Goals: decrease in depressive symptoms, decrease in anxiety symptoms, decrease in suicidal thoughts    Long Term Goals: improvement in depression, improvement in anxiety, free of suicidal thoughts    Progress Towards Goals: starting psychiatric medications as prescribed    Recommended Treatment: medication management, patient medication education, group therapy, milieu therapy, continued Behavioral Health psychiatric evaluation/assessment process    Treatment Frequency: daily medication monitoring, group and milieu therapy daily, monitoring through interdisciplinary rounds, monitoring through weekly patient care conferences    Expected Discharge Date:  7 days    Discharge Plan: return to previous living arrangement    Treatment Plan Created/Updated By: Elvin Alba MD

## 2021-01-14 NOTE — NURSING NOTE
Patient ambulates in the hallway with steady gait  She states she "recently lost her  to Covid"  She is calm and cooperative Affect somewhat flat  When waking her up for HS meds she was startled and was calling mino herzog and she took pills out of the cup and placed on her abdomen  She then became acclimated to time/place   She denies SI

## 2021-01-15 PROCEDURE — 99231 SBSQ HOSP IP/OBS SF/LOW 25: CPT | Performed by: NURSE PRACTITIONER

## 2021-01-15 RX ADMIN — DICYCLOMINE HYDROCHLORIDE 20 MG: 20 TABLET ORAL at 17:09

## 2021-01-15 RX ADMIN — DICYCLOMINE HYDROCHLORIDE 20 MG: 20 TABLET ORAL at 06:11

## 2021-01-15 RX ADMIN — Medication 1 TABLET: at 08:16

## 2021-01-15 RX ADMIN — DONEPEZIL HYDROCHLORIDE 10 MG: 10 TABLET, FILM COATED ORAL at 21:33

## 2021-01-15 RX ADMIN — FOLIC ACID 1 MG: 1 TABLET ORAL at 08:16

## 2021-01-15 RX ADMIN — Medication 2000 UNITS: at 08:16

## 2021-01-15 RX ADMIN — THIAMINE HCL TAB 100 MG 100 MG: 100 TAB at 08:16

## 2021-01-15 RX ADMIN — DICYCLOMINE HYDROCHLORIDE 20 MG: 20 TABLET ORAL at 11:36

## 2021-01-15 RX ADMIN — NYSTATIN: 100000 CREAM TOPICAL at 08:17

## 2021-01-15 RX ADMIN — DICYCLOMINE HYDROCHLORIDE 20 MG: 20 TABLET ORAL at 21:33

## 2021-01-15 RX ADMIN — PANTOPRAZOLE SODIUM 20 MG: 20 TABLET, DELAYED RELEASE ORAL at 06:10

## 2021-01-15 RX ADMIN — APIXABAN 2.5 MG: 2.5 TABLET, FILM COATED ORAL at 17:09

## 2021-01-15 RX ADMIN — MIRTAZAPINE 45 MG: 30 TABLET, FILM COATED ORAL at 21:33

## 2021-01-15 RX ADMIN — APIXABAN 2.5 MG: 2.5 TABLET, FILM COATED ORAL at 08:16

## 2021-01-15 NOTE — PROGRESS NOTES
Progress Note - Behavioral Health   Liane Ric Jerome 78 y o  female MRN: [id number]  Unit/Bed#: [facility] Romero 022-45 Encounter: [id number]    The patient was seen for continuing care and reviewed with treatment team   Staff notes the patient has been pleasant and forgetful at times  She attended groups yesterday and was interacting appropriately  Disposition is pending  She brightens on approach and says her mood is okay  She denies depression and anxiety currently  Her appetite is good and she is sleeping well at night  She denies SI currently      Severe recurrent major depression without psychotic features ([facility] [address] )    Vital signs in last 24 hours:  Temp:  [96 9 °F (36 1 °C)-98 5 °F (36 9 °C)] 96 9 °F (36 1 °C)  HR:  [76-91] 91  Resp:  [16] 16  BP: (137-150)/(63-74) 138/63    Mental Status Evaluation:    Appearance Adequate hygiene and grooming and Good eye contact   Behavior cooperative   Mood euthymic   Speech Sparse   Affect blunted   Thought Processes Goal directed and coherent   Thought Content Does not verbalize delusional material   Perceptual Disturbances Denies hallucinations and does not appear to be responding to internal stimuli   Risk Potential Suicidal/Homicidal Ideation - No evidence of suicidal or homicidal ideation and Patient does not verbalize suicidal or homicidal ideation  Risk of Violence - No evidence of risk for violence found on assessment  Risk of Self Mutilation - No evidence of risk for self mutilation found on assessment   Sensorium oriented to person and place   Cognition/Memory short term memory impaired   Consciousness alert and awake   Attention/Concentration attention span and concentration appear shorter than expected for age   Insight limited   Judgement limited   Muscle Strength and Gait/Station normal muscle strength and normal muscle tone, normal gait/station and normal balance   Motor Activity no abnormal movements       Progress Toward Goals:  Placement pending      Recommended Treatment: Continue with pharmacotherapy, group therapy, milieu therapy and occupational therapy    The patient will be maintained on the following medications:  Current Facility-Administered Medications   Medication Dose Route Frequency Provider Last Rate    acetaminophen  650 mg Oral Q6H PRN Jacobo Ortega MD      apixaban  2 5 mg Oral BID Gary Yen MD      bismuth subsalicylate  135 mg Oral Q6H PRN Jermaine Barraza MD      cholecalciferol  2,000 Units Oral Daily Jacobo Ortega MD      dicyclomine  20 mg Oral 4x Daily (AC & HS) Jermaine Barraza MD      donepezil  10 mg Oral HS Jacobo Ortega MD      folic acid  1 mg Oral Daily Gary Yen MD      haloperidol  0 5 mg Oral Q6H PRN Jacobo Ortega MD      haloperidol  1 mg Oral Q8H PRN Jacobo Ortega MD      haloperidol lactate  2 mg Intramuscular Q6H PRN Jacobo Ortega MD      hydrOXYzine HCL  25 mg Oral Q6H PRN Jacobo Ortega MD      loperamide  2 mg Oral 4x Daily PRN Evelia Faulkner MD      LORazepam  1 mg Intramuscular Q6H PRN Jacobo Ortega MD      LORazepam  1 mg Oral Q6H PRN Jacobo Ortega MD      mirtazapine  45 mg Oral HS Margarita Cunningham MD      multivitamin-minerals  1 tablet Oral Daily Jacobo Ortega MD      nicotine polacrilex  4 mg Oral Q2H PRN Jacobo Ortega MD      nystatin   Topical BID Jacobo Ortega MD      pantoprazole  20 mg Oral Early Morning Jacobo Ortega MD      thiamine  100 mg Oral Daily Gary Yen MD      traZODone  50 mg Oral HS PRN Jacobo Ortega MD         Severe recurrent major depression without psychotic features ([facility] [address] )

## 2021-01-15 NOTE — PLAN OF CARE
Problem: Risk for Self Injury/Neglect  Goal: Treatment Goal: Remain safe during length of stay, learn and adopt new coping skills, and be free of self-injurious ideation, impulses and acts at the time of discharge  Outcome: Progressing     Problem: Depression  Goal: Treatment Goal: Demonstrate behavioral control of depressive symptoms, verbalize feelings of improved mood/affect, and adopt new coping skills prior to discharge  Outcome: Progressing  Goal: Verbalize thoughts and feelings  Description: Interventions:  - Assess and re-assess patient's level of risk   - Engage patient in 1:1 interactions, daily, for a minimum of 15 minutes   - Encourage patient to express feelings, fears, frustrations, hopes   Outcome: Progressing  Goal: Refrain from harming self  Description: Interventions:  - Monitor patient closely, per order   - Supervise medication ingestion, monitor effects and side effects   Outcome: Progressing  Goal: Refrain from isolation  Description: Interventions:  - Develop a trusting relationship   - Encourage socialization   Outcome: Progressing  Goal: Refrain from self-neglect  Outcome: Progressing  Goal: Attend and participate in unit activities, including therapeutic, recreational, and educational groups  Description: Interventions:  - Provide therapeutic and educational activities daily, encourage attendance and participation, and document same in the medical record   Outcome: Progressing  Goal: Complete daily ADLs, including personal hygiene independently, as able  Description: Interventions:  - Observe, teach, and assist patient with ADLS  -  Monitor and promote a balance of rest/activity, with adequate nutrition and elimination   Outcome: Progressing     Problem: Anxiety  Goal: Anxiety is at manageable level  Description: Interventions:  - Assess and monitor patient's anxiety level     - Monitor for signs and symptoms (heart palpitations, chest pain, shortness of breath, headaches, nausea, feeling jumpy, restlessness, irritable, apprehensive)  - Collaborate with interdisciplinary team and initiate plan and interventions as ordered    - [place] patient to unit/surroundings  - Explain treatment plan  - Encourage participation in care  - Encourage verbalization of concerns/fears  - Identify coping mechanisms  - Assist in developing anxiety-reducing skills  - Administer/offer alternative therapies  - Limit or eliminate stimulants  Outcome: Progressing     Problem: DISCHARGE PLANNING  Goal: Discharge to home or other facility with appropriate resources  Description: INTERVENTIONS:  - Identify barriers to discharge w/patient and caregiver  - Arrange for needed discharge resources and transportation as appropriate  - Identify discharge learning needs (meds, wound care, etc )  - Refer to Case Management Department for coordinating discharge planning if the patient needs post-hospital services based on physician/advanced practitioner order or complex needs related to functional status, cognitive ability, or social support system  Outcome: Progressing     Problem: Ineffective Coping  Goal: Participates in unit activities  Description: Interventions:  - Provide therapeutic environment   - Provide required programming   - Redirect inappropriate behaviors   Outcome: Progressing

## 2021-01-15 NOTE — CASE MANAGEMENT
This writer left message for patient's daughter Lorene Alexander, requesting a return call to discuss discharge planning for the patient  Lorene Alexander ca;;ed this writer to discuss she has contacted [address] [place] [address] which is [address] [place] [address] located in patient's town  Lorene Alexander reports they have availability and they request 1 week notice prior to admission  This writer discussed a new referral for [address] will be submitted to the [facility], since patient's OT evaluation was completed  This writer discussed if patient is NCFE a referral can be submitted to [address] [place] [address] [address]  [facility]  This writer explained that patient is ready for discharge, but placement must be established  Debby and this writer agreed if placement is not available to Nursing facility or patient's referal is denied by  Aging, the family will place patient at [address] [place] [address]  This writer will complete new NCFE referral and submit to the [place] on 1/19/21

## 2021-01-15 NOTE — PROGRESS NOTES
01/15/21 1400   Activity/Group Checklist   Group Other (Comment)  (Community Awareness)   Attendance Attended   Attendance Duration (min) 16-30   Interactions Interacted appropriately   Affect/Mood Blunted/flat   Goals Achieved Able to listen to others; Able to engage in interactions     Pt arrived a few minutes late to community awareness group  Pt participated in group activity when prompted but did not engage in topic discussion

## 2021-01-15 NOTE — PROGRESS NOTES
Pt attended 3/3 groups  Pt visible on unit but limited interactions  Pt focused on uncertainty of d/c disposition   01/15/21 1000   Activity/Group Checklist   Group Other (Comment)  ( recovery)   Attendance Attended   Attendance Duration (min) 16-30   Interactions Interacted appropriately   Affect/Mood Blunted/flat   Goals Achieved Identified feelings; Discussed coping strategies; Able to listen to others; Able to engage in interactions

## 2021-01-15 NOTE — NURSING NOTE
Pt reported good sleep,brightens up on approach this morning  Pt denies any depression or anxiety,denies any SI/HI/AH/VH  Med compliant  Pt was assisted with her shower this morning  No distress noted  Will continue to monitor and provide support

## 2021-01-15 NOTE — NURSING NOTE
Pt is visible on unit and social with peers  Pt is calm and cooperative with care  Pt had 25% at dinner time  Pt denies s/s including SI/HI/AH/VH  No any other symptoms reported , will continue to monitor

## 2021-01-15 NOTE — PROGRESS NOTES
01/15/21 0835   Team Meeting   Meeting Type Daily Rounds   Team Members Present   Team Members Present Physician;Nurse;;   Physician Team Member Sheryl Verde   Nursing Team Member [id number] Management Team Member [facility]   Social Work Team Member Danitza Lange   Patient/Family Present   Patient Present No   Patient's Family Present No     Patient denies depression and anxiety  She is compliant with medications  She is forgetful but pleasant  Patient is ready for discharge  This writer will coordinate with children for discharge

## 2021-01-16 PROCEDURE — 99232 SBSQ HOSP IP/OBS MODERATE 35: CPT | Performed by: PHYSICIAN ASSISTANT

## 2021-01-16 RX ADMIN — BISMUTH SUBSALICYLATE 524 MG: 262 LIQUID ORAL at 15:47

## 2021-01-16 RX ADMIN — APIXABAN 2.5 MG: 2.5 TABLET, FILM COATED ORAL at 08:41

## 2021-01-16 RX ADMIN — MIRTAZAPINE 45 MG: 30 TABLET, FILM COATED ORAL at 21:03

## 2021-01-16 RX ADMIN — DICYCLOMINE HYDROCHLORIDE 20 MG: 20 TABLET ORAL at 11:18

## 2021-01-16 RX ADMIN — DONEPEZIL HYDROCHLORIDE 10 MG: 10 TABLET, FILM COATED ORAL at 21:03

## 2021-01-16 RX ADMIN — PANTOPRAZOLE SODIUM 20 MG: 20 TABLET, DELAYED RELEASE ORAL at 06:10

## 2021-01-16 RX ADMIN — FOLIC ACID 1 MG: 1 TABLET ORAL at 08:41

## 2021-01-16 RX ADMIN — Medication 2000 UNITS: at 08:41

## 2021-01-16 RX ADMIN — APIXABAN 2.5 MG: 2.5 TABLET, FILM COATED ORAL at 17:03

## 2021-01-16 RX ADMIN — DICYCLOMINE HYDROCHLORIDE 20 MG: 20 TABLET ORAL at 15:46

## 2021-01-16 RX ADMIN — DICYCLOMINE HYDROCHLORIDE 20 MG: 20 TABLET ORAL at 21:03

## 2021-01-16 RX ADMIN — Medication 1 TABLET: at 08:41

## 2021-01-16 RX ADMIN — THIAMINE HCL TAB 100 MG 100 MG: 100 TAB at 08:41

## 2021-01-16 RX ADMIN — DICYCLOMINE HYDROCHLORIDE 20 MG: 20 TABLET ORAL at 06:09

## 2021-01-16 NOTE — NURSING NOTE
Patient present on the unit and social with peers  Patient denies all symptoms  Patient is less somatic today  Medication complaint and cooperative with care  Appetite is fair

## 2021-01-16 NOTE — PLAN OF CARE
Problem: Risk for Self Injury/Neglect  Goal: Treatment Goal: Remain safe during length of stay, learn and adopt new coping skills, and be free of self-injurious ideation, impulses and acts at the time of discharge  Outcome: Progressing     Problem: Depression  Goal: Treatment Goal: Demonstrate behavioral control of depressive symptoms, verbalize feelings of improved mood/affect, and adopt new coping skills prior to discharge  Outcome: Progressing  Goal: Verbalize thoughts and feelings  Description: Interventions:  - Assess and re-assess patient's level of risk   - Engage patient in 1:1 interactions, daily, for a minimum of 15 minutes   - Encourage patient to express feelings, fears, frustrations, hopes   Outcome: Progressing  Goal: Refrain from harming self  Description: Interventions:  - Monitor patient closely, per order   - Supervise medication ingestion, monitor effects and side effects   Outcome: Progressing  Goal: Refrain from isolation  Description: Interventions:  - Develop a trusting relationship   - Encourage socialization   Outcome: Progressing  Goal: Refrain from self-neglect  Outcome: Progressing  Goal: Attend and participate in unit activities, including therapeutic, recreational, and educational groups  Description: Interventions:  - Provide therapeutic and educational activities daily, encourage attendance and participation, and document same in the medical record   Outcome: Progressing  Goal: Complete daily ADLs, including personal hygiene independently, as able  Description: Interventions:  - Observe, teach, and assist patient with ADLS  -  Monitor and promote a balance of rest/activity, with adequate nutrition and elimination   Outcome: Progressing     Problem: Anxiety  Goal: Anxiety is at manageable level  Description: Interventions:  - Assess and monitor patient's anxiety level     - Monitor for signs and symptoms (heart palpitations, chest pain, shortness of breath, headaches, nausea, feeling jumpy, restlessness, irritable, apprehensive)  - Collaborate with interdisciplinary team and initiate plan and interventions as ordered    - [place] patient to unit/surroundings  - Explain treatment plan  - Encourage participation in care  - Encourage verbalization of concerns/fears  - Identify coping mechanisms  - Assist in developing anxiety-reducing skills  - Administer/offer alternative therapies  - Limit or eliminate stimulants  Outcome: Progressing     Problem: Ineffective Coping  Goal: Participates in unit activities  Description: Interventions:  - Provide therapeutic environment   - Provide required programming   - Redirect inappropriate behaviors   Outcome: Progressing

## 2021-01-16 NOTE — PROGRESS NOTES
Progress Note - [address] J Stump 78 y o  female MRN: [id number]  Unit/Bed#: Yves Headings 001-11 Encounter: [id number]    Patient seen, chart reviewed and discussed with staff  Nursing staff notes patient overall has been compliant with medications and treatment plan  No aggressive or agitated behavior  Less somatic preoccupations  Patient is calm and cooperative on approach  Tells me she is doing okay  Reports that her anxiety and depression are up and down  She denies any suicidal or homicidal ideation  States that she feels depressed when she thinks about her living situation  Reports that she does not want to live alone but is fearful about going to a facility  She continues to grieve the recent death of her spouse  She is forgetful at times but engages easily in conversation  Denies any adverse effects with her medications  No physical complaints of pain or discomfort today  Sleeping and eating adequately      Behavior over the last 24 hours:  unchanged  Sleep: normal  Appetite: normal  Medication side effects: No  ROS: no complaints  /71 (BP Location: Right arm)   Pulse 86   Temp (!) 97 1 °F (36 2 °C) (Temporal)   Resp 16   Ht 5' 6" (1 676 m)   Wt 54 7 kg (120 lb 9 5 oz)   LMP  (LMP Unknown)   SpO2 95%   BMI 19 46 kg/m²     Medications:   Current Facility-Administered Medications   Medication Dose Route Frequency    acetaminophen (TYLENOL) tablet 650 mg  650 mg Oral Q6H PRN    apixaban (ELIQUIS) tablet 2 5 mg  2 5 mg Oral BID    bismuth subsalicylate (PEPTO BISMOL) oral suspension 524 mg  524 mg Oral Q6H PRN    cholecalciferol (VITAMIN D3) tablet 2,000 Units  2,000 Units Oral Daily    dicyclomine (BENTYL) tablet 20 mg  20 mg Oral 4x Daily (AC & HS)    donepezil (ARICEPT) tablet 10 mg  10 mg Oral HS    folic acid (FOLVITE) tablet 1 mg  1 mg Oral Daily    haloperidol (HALDOL) tablet 0 5 mg  0 5 mg Oral Q6H PRN    haloperidol (HALDOL) tablet 1 mg  1 mg Oral Q8H PRN    haloperidol lactate (HALDOL) injection 2 mg  2 mg Intramuscular Q6H PRN    hydrOXYzine HCL (ATARAX) tablet 25 mg  25 mg Oral Q6H PRN    loperamide (IMODIUM) capsule 2 mg  2 mg Oral 4x Daily PRN    LORazepam (ATIVAN) injection 1 mg  1 mg Intramuscular Q6H PRN    LORazepam (ATIVAN) tablet 1 mg  1 mg Oral Q6H PRN    mirtazapine (REMERON) tablet 45 mg  45 mg Oral HS    multivitamin-minerals (CENTRUM) tablet 1 tablet  1 tablet Oral Daily    nicotine polacrilex (NICORETTE) gum 4 mg  4 mg Oral Q2H PRN    pantoprazole (PROTONIX) EC tablet 20 mg  20 mg Oral Early Morning    thiamine (VITAMIN B1) tablet 100 mg  100 mg Oral Daily    traZODone (DESYREL) tablet 50 mg  50 mg Oral HS PRN       Labs:   Admission on 12/19/2020   Component Date Value Ref Range Status    Ventricular Rate 12/23/2020 104  BPM Final    Atrial Rate 12/23/2020 104  BPM Final    MA Interval 12/23/2020 152  ms Final    QRSD Interval 12/23/2020 80  ms Final    QT Interval 12/23/2020 334  ms Final    QTC Interval 12/23/2020 439  ms Final    P Axis 12/23/2020 36  degrees Final    QRS Axis 12/23/2020 -2  degrees Final    T Wave Axis 12/23/2020 33  degrees Final       Mental Status Evaluation:  Appearance:  age appropriate and Adequate hygiene   Behavior:  Calm, cooperative, polite   Speech:  normal pitch and normal volume   Mood:  anxious and dysthymic   Affect:  mood-congruent   Thought Process:  goal directed   Thought Content:  No delusions voiced   Perceptual Disturbances: Denies auditory or visual hallucinations   Risk Potential: Denies suicidal or homicidal ideation, no aggressive behavior   Sensorium:  person and place   Cognition:  recent memory mildly impaired, long-term memory fair   Consciousness:  alert and awake    Attention: attention span appeared shorter than expected for age   Insight:  limited   Judgment: limited   Gait/Station: normal gait/station   Motor Activity: no abnormal movements     Progress Toward Goals: Approaching baseline    Assessment/Plan   Principal Problem:    Depression with suicidal ideation  Active Problems:    COVID-19 virus infection    Suicidal ideation    History of fall    History of alcohol abuse    Major neurocognitive disorder (HCC)    Anxiety    Anemia of chronic disease    Irritable bowel syndrome with diarrhea      Recommended Treatment:  Discharge placement is pending  Continue with medications as follows  Aricept 10 mg q h s  Mirtazapine 45 mg q h s  Continue with group therapy, milieu therapy and occupational therapy  Risks, benefits and possible side effects of Medications:   Risks, benefits, and possible side effects of medications explained to patient and patient verbalizes understanding  Counseling / Coordination of Care  Total floor / unit time spent today 25 minutes  Greater than 50% of total time was spent with the patient and / or family counseling and / or coordination of care   A description of the counseling / coordination of care:   Medication management, chart review, patient interview

## 2021-01-17 PROCEDURE — 99232 SBSQ HOSP IP/OBS MODERATE 35: CPT | Performed by: PHYSICIAN ASSISTANT

## 2021-01-17 RX ADMIN — DICYCLOMINE HYDROCHLORIDE 20 MG: 20 TABLET ORAL at 11:47

## 2021-01-17 RX ADMIN — DONEPEZIL HYDROCHLORIDE 10 MG: 10 TABLET, FILM COATED ORAL at 21:14

## 2021-01-17 RX ADMIN — DICYCLOMINE HYDROCHLORIDE 20 MG: 20 TABLET ORAL at 17:18

## 2021-01-17 RX ADMIN — Medication 1 TABLET: at 08:05

## 2021-01-17 RX ADMIN — BISMUTH SUBSALICYLATE 524 MG: 262 LIQUID ORAL at 14:11

## 2021-01-17 RX ADMIN — DICYCLOMINE HYDROCHLORIDE 20 MG: 20 TABLET ORAL at 21:14

## 2021-01-17 RX ADMIN — MIRTAZAPINE 45 MG: 30 TABLET, FILM COATED ORAL at 21:14

## 2021-01-17 RX ADMIN — FOLIC ACID 1 MG: 1 TABLET ORAL at 08:05

## 2021-01-17 RX ADMIN — PANTOPRAZOLE SODIUM 20 MG: 20 TABLET, DELAYED RELEASE ORAL at 06:11

## 2021-01-17 RX ADMIN — THIAMINE HCL TAB 100 MG 100 MG: 100 TAB at 08:05

## 2021-01-17 RX ADMIN — DICYCLOMINE HYDROCHLORIDE 20 MG: 20 TABLET ORAL at 06:11

## 2021-01-17 RX ADMIN — LOPERAMIDE HYDROCHLORIDE 2 MG: 2 CAPSULE ORAL at 17:25

## 2021-01-17 RX ADMIN — APIXABAN 2.5 MG: 2.5 TABLET, FILM COATED ORAL at 17:18

## 2021-01-17 RX ADMIN — Medication 2000 UNITS: at 08:05

## 2021-01-17 RX ADMIN — APIXABAN 2.5 MG: 2.5 TABLET, FILM COATED ORAL at 08:05

## 2021-01-17 NOTE — PLAN OF CARE
Problem: Risk for Self Injury/Neglect  Goal: Treatment Goal: Remain safe during length of stay, learn and adopt new coping skills, and be free of self-injurious ideation, impulses and acts at the time of discharge  Outcome: Progressing     Problem: Depression  Goal: Treatment Goal: Demonstrate behavioral control of depressive symptoms, verbalize feelings of improved mood/affect, and adopt new coping skills prior to discharge  Outcome: Progressing  Goal: Verbalize thoughts and feelings  Description: Interventions:  - Assess and re-assess patient's level of risk   - Engage patient in 1:1 interactions, daily, for a minimum of 15 minutes   - Encourage patient to express feelings, fears, frustrations, hopes   Outcome: Progressing  Goal: Refrain from harming self  Description: Interventions:  - Monitor patient closely, per order   - Supervise medication ingestion, monitor effects and side effects   Outcome: Progressing  Goal: Refrain from isolation  Description: Interventions:  - Develop a trusting relationship   - Encourage socialization   Outcome: Progressing  Goal: Refrain from self-neglect  Outcome: Progressing  Goal: Attend and participate in unit activities, including therapeutic, recreational, and educational groups  Description: Interventions:  - Provide therapeutic and educational activities daily, encourage attendance and participation, and document same in the medical record   Outcome: Progressing  Goal: Complete daily ADLs, including personal hygiene independently, as able  Description: Interventions:  - Observe, teach, and assist patient with ADLS  -  Monitor and promote a balance of rest/activity, with adequate nutrition and elimination   Outcome: Progressing     Problem: Anxiety  Goal: Anxiety is at manageable level  Description: Interventions:  - Assess and monitor patient's anxiety level     - Monitor for signs and symptoms (heart palpitations, chest pain, shortness of breath, headaches, nausea, feeling jumpy, restlessness, irritable, apprehensive)  - Collaborate with interdisciplinary team and initiate plan and interventions as ordered    - [place] patient to unit/surroundings  - Explain treatment plan  - Encourage participation in care  - Encourage verbalization of concerns/fears  - Identify coping mechanisms  - Assist in developing anxiety-reducing skills  - Administer/offer alternative therapies  - Limit or eliminate stimulants  Outcome: Progressing     Problem: Ineffective Coping  Goal: Participates in unit activities  Description: Interventions:  - Provide therapeutic environment   - Provide required programming   - Redirect inappropriate behaviors   Outcome: Progressing

## 2021-01-17 NOTE — NURSING NOTE
Patient present on the unit and social with peers  Patient denies all symptoms  Patient is less somatic today  Medication complaint and cooperative with care  Appetite is fair    Patient did C/O indigestion and asked for Pepto X 1

## 2021-01-17 NOTE — PROGRESS NOTES
Progress Note - [address] J Stump 78 y o  female MRN: [id number]  Unit/Bed#: Jan Gains 838-01 Encounter: [id number]    Pt seen, chart reviewed and discussed with staff  Nursing staff reports pt compliant with medications and treatment plan  No significant change over the past 24 hours  Slept well and good po  Pt pleasant on approach  Reports decreased depression and no anxiety today  Denies SI or HI  Visible in milieu and attending groups  No hallucinations noted  Reports pain in feet due to toenails and staff reports nails are long  No other physical concerns      Behavior over the last 24 hours:  unchanged  Sleep: normal  Appetite: normal  Medication side effects: No  ROS: toenail pain  /65 (BP Location: Right arm)   Pulse 99   Temp 97 8 °F (36 6 °C) (Temporal)   Resp 16   Ht 5' 6" (1 676 m)   Wt 54 7 kg (120 lb 9 5 oz)   LMP  (LMP Unknown)   SpO2 96%   BMI 19 46 kg/m²     Medications:   Current Facility-Administered Medications   Medication Dose Route Frequency    acetaminophen (TYLENOL) tablet 650 mg  650 mg Oral Q6H PRN    apixaban (ELIQUIS) tablet 2 5 mg  2 5 mg Oral BID    bismuth subsalicylate (PEPTO BISMOL) oral suspension 524 mg  524 mg Oral Q6H PRN    cholecalciferol (VITAMIN D3) tablet 2,000 Units  2,000 Units Oral Daily    dicyclomine (BENTYL) tablet 20 mg  20 mg Oral 4x Daily (AC & HS)    donepezil (ARICEPT) tablet 10 mg  10 mg Oral HS    folic acid (FOLVITE) tablet 1 mg  1 mg Oral Daily    haloperidol (HALDOL) tablet 0 5 mg  0 5 mg Oral Q6H PRN    haloperidol (HALDOL) tablet 1 mg  1 mg Oral Q8H PRN    haloperidol lactate (HALDOL) injection 2 mg  2 mg Intramuscular Q6H PRN    hydrOXYzine HCL (ATARAX) tablet 25 mg  25 mg Oral Q6H PRN    loperamide (IMODIUM) capsule 2 mg  2 mg Oral 4x Daily PRN    LORazepam (ATIVAN) injection 1 mg  1 mg Intramuscular Q6H PRN    LORazepam (ATIVAN) tablet 1 mg  1 mg Oral Q6H PRN    mirtazapine (REMERON) tablet 45 mg  45 mg Oral HS    multivitamin-minerals (CENTRUM) tablet 1 tablet  1 tablet Oral Daily    nicotine polacrilex (NICORETTE) gum 4 mg  4 mg Oral Q2H PRN    pantoprazole (PROTONIX) EC tablet 20 mg  20 mg Oral Early Morning    thiamine (VITAMIN B1) tablet 100 mg  100 mg Oral Daily    traZODone (DESYREL) tablet 50 mg  50 mg Oral HS PRN       Labs:   Admission on 12/19/2020   Component Date Value Ref Range Status    Ventricular Rate 12/23/2020 104  BPM Final    Atrial Rate 12/23/2020 104  BPM Final    MD Interval 12/23/2020 152  ms Final    QRSD Interval 12/23/2020 80  ms Final    QT Interval 12/23/2020 334  ms Final    QTC Interval 12/23/2020 439  ms Final    P Axis 12/23/2020 36  degrees Final    QRS Axis 12/23/2020 -2  degrees Final    T Wave Axis 12/23/2020 33  degrees Final       Mental Status Evaluation:  Appearance:  age appropriate and casually dressed   Behavior:  calm, cooperative, good eye contact   Speech:  normal pitch and normal volume   Mood:  less depressed, less anxious   Affect:  mood-congruent   Thought Process:  goal directed   Thought Content:  no delusions   Perceptual Disturbances: None   Risk Potential: Suicidal Ideations none, Homicidal Ideations none and Potential for Aggression No   Sensorium:  person and place   Cognition:  recent memory impaired   Consciousness:  alert and awake    Attention: attention span appeared shorter than expected for age   Insight:  limited   Judgment: limited   Gait/Station: normal gait/station   Motor Activity: no abnormal movements     Progress Toward Goals:   Approaching her baseline, placement pending    Assessment/Plan   Principal Problem:    Depression with suicidal ideation  Active Problems:    COVID-19 virus infection    Suicidal ideation    History of fall    History of alcohol abuse    Major neurocognitive disorder (HCC)    Anxiety    Anemia of chronic disease    Irritable bowel syndrome with diarrhea      Recommended Treatment:  Consult Podiatry regarding toenails  Continue with Aricept 10 mg q h s  Mirtazapine 45 mg q h s  Discharge placement is pending     Continue with group therapy, milieu therapy and occupational therapy  Risks, benefits and possible side effects of Medications:   Risks, benefits, and possible side effects of medications explained to patient and patient verbalizes understanding  Counseling / Coordination of Care  Total floor / unit time spent today 25 minutes  Greater than 50% of total time was spent with the patient and / or family counseling and / or coordination of care   A description of the counseling / coordination of care:   Medication management, chart review, patient interview

## 2021-01-18 PROCEDURE — 99232 SBSQ HOSP IP/OBS MODERATE 35: CPT | Performed by: NURSE PRACTITIONER

## 2021-01-18 RX ADMIN — FOLIC ACID 1 MG: 1 TABLET ORAL at 08:22

## 2021-01-18 RX ADMIN — APIXABAN 2.5 MG: 2.5 TABLET, FILM COATED ORAL at 17:32

## 2021-01-18 RX ADMIN — APIXABAN 2.5 MG: 2.5 TABLET, FILM COATED ORAL at 08:22

## 2021-01-18 RX ADMIN — Medication 1 TABLET: at 08:22

## 2021-01-18 RX ADMIN — DICYCLOMINE HYDROCHLORIDE 20 MG: 20 TABLET ORAL at 21:31

## 2021-01-18 RX ADMIN — DONEPEZIL HYDROCHLORIDE 10 MG: 10 TABLET, FILM COATED ORAL at 21:31

## 2021-01-18 RX ADMIN — DICYCLOMINE HYDROCHLORIDE 20 MG: 20 TABLET ORAL at 11:26

## 2021-01-18 RX ADMIN — DICYCLOMINE HYDROCHLORIDE 20 MG: 20 TABLET ORAL at 15:53

## 2021-01-18 RX ADMIN — PANTOPRAZOLE SODIUM 20 MG: 20 TABLET, DELAYED RELEASE ORAL at 05:11

## 2021-01-18 RX ADMIN — Medication 2000 UNITS: at 08:22

## 2021-01-18 RX ADMIN — MIRTAZAPINE 45 MG: 30 TABLET, FILM COATED ORAL at 21:31

## 2021-01-18 RX ADMIN — THIAMINE HCL TAB 100 MG 100 MG: 100 TAB at 08:22

## 2021-01-18 RX ADMIN — DICYCLOMINE HYDROCHLORIDE 20 MG: 20 TABLET ORAL at 05:12

## 2021-01-18 NOTE — PROGRESS NOTES
01/18/21 1226   Team Meeting   Meeting Type Daily Rounds   Initial Conference Date 01/18/21   Next Conference Date 01/19/21   Team Members Present   Team Members Present Physician;Nurse;;; Occupational Therapist   Physician Team Member Carolina   Nursing Team Member [address] [facility] Management Team Member [address] [facility]   Social Work Team Member EUSEBIO   OT Team Member Zion   Patient/Family Present   Patient Present No   Patient's Family Present No   Visible, med compliant, sleeps, denies s/s

## 2021-01-18 NOTE — PROGRESS NOTES
Pt attended groups  Pt was able to self express  Pt respectful of peers  Pt shared a story from paper in am and was inspired by a boy who received a heart transplant  Pt stated she enjoys reading positive articles in the paper and not politics  Provided a calendar for patient  01/18/21 1000   Activity/Group Checklist   Group Other (Comment)  ( open discussion)   Attendance Attended   Attendance Duration (min) 31-45   Interactions Interacted appropriately   Affect/Mood Appropriate   Goals Achieved Identified feelings; Discussed coping strategies; Increased hopefulness; Able to listen to others; Able to engage in interactions

## 2021-01-18 NOTE — CONSULTS
Consult Routine Foot Care- Podiatry   Kellen Caceres Stump 78 y o  female MRN: [id number]  Unit/Bed#: Pedro Garcia 469-36 Encounter: [id number]    Assessment/Plan     Assessment:  1  Onychomycosis x 10  2  PVD     Plan:  - pt eval and managed    need 1 out of 2  - Number and complexity of problems addressed:   1 stable chronic illness   as shown    - Amount/complexity of data reviewed and analyzed: 1 of 2 categories needed    Category 1: prior patient notes were analyzed today before evaluating and managing patient  All PMH was discussed with pt today  - Risk of complications: minimal risk of morbidity from additional testing or treatment involved with this patient    - discussed anatomy, condition, treatment plan and options  They were instructed on proper foot care  The patient was seen today for greater than total of  30-44 minutes     This is total time spent today involving both face-to-face time and non face-to-face time  This time spent includes  reviewing their past medical history  , performing a medically appropriate examination and evaluation of the patient, counseling and educating the patient,  , documenting all findings in EMR, and independently interpreting results and communicating results with  patient      and discussing their condition and treatment options, risks, and potential complications  I have discussed the findings of this examination with the patient  The discussion included a complete verbal explanation of the examination results, diagnosis and planned treatment(s)  A schedule for future care needs was explained  The patient has verbalized the understanding of these instructions at this time  If any questions should arise after returning home I have encouraged the patient to feel free to call the office  The expected prognosis for the area of chief concern was reviewed and discussed with the patient  Questions about this were discussed   If the patient has concerns they do not understand, they were encouraged to contact me for further explanations  I explained to patient the importance of periodic foot evaluations to minimize complications from peripheral arterial disease  I recommend primary foot care periodically to help reduce the potential complications due to dystrophic or hypertrophic toenails and hyperkeratotic lesions  Hypertrophic toe nails and lesions were treated as discussed in procedures  - Podiatry signing off, thank you for the consult  History of Present Illness     HPI:  Juan Srivastava is a 78 y o  female who presents with painful, elongated toenails  They state that they see no Podiatrist outpatient  They have difficulty applying their socks and shoes due to the elongation of the nails  The pressure within their shoe gear is painful and they have been unable to cut their nails adequately  Consults  Review of Systems   Constitutional: Negative  HENT: Negative  Eyes: Negative  Respiratory: Negative  Cardiovascular: Negative  Gastrointestinal: Negative  Musculoskeletal: Negative   Skin: elongated thickened toenails   Neurological: Negative  Historical Information   Past Medical History:   Diagnosis Date    Hypertension     IBS (irritable bowel syndrome)      No past surgical history on file  Social History   Social History     Substance and Sexual Activity   Alcohol Use Yes    Alcohol/week: 21 0 standard drinks    Types: 21 Cans of beer per week    Frequency: Monthly or less    Drinks per session: 1 or 2    Comment: states 3 beers/night     Social History     Substance and Sexual Activity   Drug Use No     Social History     Tobacco Use   Smoking Status Former Smoker   Smokeless Tobacco Never Used     Family History: No family history on file      Meds/Allergies   Medications Prior to Admission   Medication    apixaban (ELIQUIS) 2 5 mg    dilTIAZem HCl (CARDIZEM PO)    donepezil (ARICEPT) 10 mg tablet    loperamide (IMODIUM) 2 mg capsule    Multiple Vitamin (MULTIVITAMIN) tablet    omeprazole (PriLOSEC) 10 mg delayed release capsule    TiZANidine (ZANAFLEX) 4 MG capsule     Allergies   Allergen Reactions    Penicillins      Does not know reaction       Objective   First Vitals:   Blood Pressure: 137/85 (12/19/20 1802)  Pulse: 102 (12/19/20 1802)  Temperature: 98 4 °F (36 9 °C) (12/19/20 1802)  Temp Source: Tympanic (12/19/20 1802)  Respirations: 16 (12/19/20 1802)  Height: 5' 6" (167 6 cm) (12/19/20 1802)  Weight - Scale: 54 4 kg (120 lb) (12/19/20 1802)  SpO2: 93 % (12/19/20 1802)    Current Vitals:   Blood Pressure: 145/58 (01/18/21 0658)  Pulse: 93 (01/18/21 0658)  Temperature: 97 6 °F (36 4 °C) (01/18/21 0658)  Temp Source: Temporal (01/18/21 2508)  Respirations: 16 (01/18/21 0658)  Height: 5' 6" (167 6 cm) (12/19/20 1802)  Weight - Scale: 54 7 kg (120 lb 9 5 oz) (01/12/21 0700)  SpO2: 95 % (01/18/21 0658)    /58 (BP Location: Right arm)   Pulse 93   Temp 97 6 °F (36 4 °C) (Temporal)   Resp 16   Ht 5' 6" (1 676 m)   Wt 54 7 kg (120 lb 9 5 oz)   LMP  (LMP Unknown)   SpO2 95%   BMI 19 46 kg/m²     General Appearance:    Alert, cooperative, no distress   Head:    Normocephalic, without obvious abnormality, atraumatic   Eyes:    PERRL, conjunctiva/corneas clear, EOM's intact            Nose:   Moist mucous membranes, no drainage or sinus tenderness   Throat:   No tenderness, no exudates   Neck:   Supple, symmetrical, trachea midline, no JVD   Back:     Symmetric, no CVA tenderness   Lungs:     Clear to auscultation bilaterally, respirations unlabored   Chest wall:    No tenderness or deformity   Heart:    Regular rate and rhythm, S1 and S2 normal, no murmur, rub   or gallop   Abdomen:     Soft, non-tender, bowel sounds active all four quadrants,     no masses, no organomegaly     Extremities:   MMT is 4/5 to all compartments of the LE, +2/4 edema B/L, Digital ROM is intact,    Pulses:   R DP is +1/4, R PT is +1/4, L DP is +1/4, L PT is +1/4, CFT< 3sec to all digits  Thin/shiny skin noted to the B/L LE, pigmentary changes to B/L LE  Nail thickening b/l  Absent digital hair growth b/l   Skin:   Nails are yellow discolored, thickened, elongated, with notable subungual debris and > 2 mm thicknessnoted to toenails 1-5 B/L  No open Lesions  Neurologic:   CNII-XII intact  Normal strength, sensation and reflexes       Throughout  Gross sensation is intact  Protective sensation is diminished       Lab Results:   Admission on 12/19/2020   Component Date Value    Ventricular Rate 12/23/2020 104     Atrial Rate 12/23/2020 104     AL Interval 12/23/2020 152     QRSD Interval 12/23/2020 80     QT Interval 12/23/2020 334     QTC Interval 12/23/2020 439     P Axis 12/23/2020 36     QRS Axis 12/23/2020 -2     T Wave Axis 12/23/2020 33      Imaging: I have personally reviewed pertinent films in PACS  EKG, Pathology, and Other Studies: I have personally reviewed pertinent reports        Code Status: Level 1 - Full Code  Advance Directive and Living Will:      Power of :    POLST:

## 2021-01-18 NOTE — PLAN OF CARE
Problem: Risk for Self Injury/Neglect  Goal: Treatment Goal: Remain safe during length of stay, learn and adopt new coping skills, and be free of self-injurious ideation, impulses and acts at the time of discharge  Outcome: Progressing     Problem: Depression  Goal: Treatment Goal: Demonstrate behavioral control of depressive symptoms, verbalize feelings of improved mood/affect, and adopt new coping skills prior to discharge  Outcome: Progressing  Goal: Verbalize thoughts and feelings  Description: Interventions:  - Assess and re-assess patient's level of risk   - Engage patient in 1:1 interactions, daily, for a minimum of 15 minutes   - Encourage patient to express feelings, fears, frustrations, hopes   Outcome: Progressing  Goal: Refrain from harming self  Description: Interventions:  - Monitor patient closely, per order   - Supervise medication ingestion, monitor effects and side effects   Outcome: Progressing  Goal: Refrain from isolation  Description: Interventions:  - Develop a trusting relationship   - Encourage socialization   Outcome: Progressing  Goal: Refrain from self-neglect  Outcome: Progressing  Goal: Attend and participate in unit activities, including therapeutic, recreational, and educational groups  Description: Interventions:  - Provide therapeutic and educational activities daily, encourage attendance and participation, and document same in the medical record   Outcome: Progressing  Goal: Complete daily ADLs, including personal hygiene independently, as able  Description: Interventions:  - Observe, teach, and assist patient with ADLS  -  Monitor and promote a balance of rest/activity, with adequate nutrition and elimination   Outcome: Progressing     Problem: Anxiety  Goal: Anxiety is at manageable level  Description: Interventions:  - Assess and monitor patient's anxiety level     - Monitor for signs and symptoms (heart palpitations, chest pain, shortness of breath, headaches, nausea, feeling jumpy, restlessness, irritable, apprehensive)  - Collaborate with interdisciplinary team and initiate plan and interventions as ordered    - Stem patient to unit/surroundings  - Explain treatment plan  - Encourage participation in care  - Encourage verbalization of concerns/fears  - Identify coping mechanisms  - Assist in developing anxiety-reducing skills  - Administer/offer alternative therapies  - Limit or eliminate stimulants  Outcome: Progressing     Problem: Ineffective Coping  Goal: Participates in unit activities  Description: Interventions:  - Provide therapeutic environment   - Provide required programming   - Redirect inappropriate behaviors   Outcome: Progressing

## 2021-01-18 NOTE — PROGRESS NOTES
Progress Note - Luz Elena BURDEN  38  Stump 78 y o  female MRN: [id number]  Unit/Bed#: Rachel Roberts 311-20 Encounter: [id number]    The patient was seen for continuing care and reviewed with treatment team  Staff notes the patient has been less somatically preoccupied, less depressed, less anxious and more visible and social  She is stable for discharge but placement is pending  She is pleasant and brightens on approach  She denies depression currently and rates her anxiety as 1/4 with 4 being the worst   She does feel sad about not being able to go home with her children after discharge and was talking about how things were much different back when she grew up  She states she is still having frequent awakenings at night but is easy to fall back asleep  Appetite has been good  Denies medication side effects  Denies SI      Severe recurrent major depression without psychotic features ([facility] [address] )    Vital signs in last 24 hours:  Temp:  [97 2 °F (36 2 °C)-97 6 °F (36 4 °C)] 97 6 °F (36 4 °C)  HR:  [86-93] 93  Resp:  [16] 16  BP: (145-152)/(58-79) 145/58    Mental Status Evaluation:    Appearance Adequate hygiene and grooming and Good eye contact   Behavior calm and cooperative   Mood improving   Speech Normal rate and volume   Affect appropriate   Thought Processes Goal directed and coherent   Thought Content Does not verbalize delusional material   Perceptual Disturbances Denies hallucinations and does not appear to be responding to internal stimuli   Risk Potential Suicidal/Homicidal Ideation - No evidence of suicidal or homicidal ideation and Patient does not verbalize suicidal or homicidal ideation  Risk of Violence - No evidence of risk for violence found on assessment  Risk of Self Mutilation - No evidence of risk for self mutilation found on assessment   Sensorium oriented to person and place   Cognition/Memory short term memory impaired   Consciousness alert and awake   Attention/Concentration attention span and concentration appear shorter than expected for age   Insight limited   Judgement limited   Muscle Strength and Gait/Station normal muscle strength and normal muscle tone, normal gait/station and normal balance   Motor Activity no abnormal movements       Progress Toward Goals:  Stable for placement      Recommended Treatment: Continue with pharmacotherapy, group therapy, milieu therapy and occupational therapy    The patient will be maintained on the following medications:  Current Facility-Administered Medications   Medication Dose Route Frequency Provider Last Rate    acetaminophen  650 mg Oral Q6H PRN Radha Schaefer MD      apixaban  2 5 mg Oral BID Allison Tsai MD      bismuth subsalicylate  137 mg Oral Q6H PRN Jayashree Mittal MD      cholecalciferol  2,000 Units Oral Daily Radha Schaefer MD      dicyclomine  20 mg Oral 4x Daily (AC & HS) Jayashree Mittal MD      donepezil  10 mg Oral HS Radha Schaefer MD      folic acid  1 mg Oral Daily Allison Tsai MD      haloperidol  0 5 mg Oral Q6H PRN Radha Schaefer MD      haloperidol  1 mg Oral Q8H PRN Radha Schaefer MD      haloperidol lactate  2 mg Intramuscular Q6H PRN Radha Schaefer MD      hydrOXYzine HCL  25 mg Oral Q6H PRN Radha Schaefer MD      loperamide  2 mg Oral 4x Daily PRN Henny Serrano MD      LORazepam  1 mg Intramuscular Q6H PRN Radha Schaefer MD      LORazepam  1 mg Oral Q6H PRN Radha Schaefer MD      mirtazapine  45 mg Oral HS Oma Bryan MD      multivitamin-minerals  1 tablet Oral Daily Radha Schaefer MD      nicotine polacrilex  4 mg Oral Q2H PRN Radha Schaefer MD      pantoprazole  20 mg Oral Early Morning Radha Schaefer MD      thiamine  100 mg Oral Daily Allison Tsai MD      traZODone  50 mg Oral HS PRN Radha Schaefer MD         Severe recurrent major depression without psychotic features ([facility] [address] )

## 2021-01-19 PROCEDURE — 99232 SBSQ HOSP IP/OBS MODERATE 35: CPT | Performed by: NURSE PRACTITIONER

## 2021-01-19 RX ADMIN — PANTOPRAZOLE SODIUM 20 MG: 20 TABLET, DELAYED RELEASE ORAL at 06:04

## 2021-01-19 RX ADMIN — THIAMINE HCL TAB 100 MG 100 MG: 100 TAB at 08:45

## 2021-01-19 RX ADMIN — APIXABAN 2.5 MG: 2.5 TABLET, FILM COATED ORAL at 08:45

## 2021-01-19 RX ADMIN — DICYCLOMINE HYDROCHLORIDE 20 MG: 20 TABLET ORAL at 21:05

## 2021-01-19 RX ADMIN — Medication 2000 UNITS: at 08:45

## 2021-01-19 RX ADMIN — FOLIC ACID 1 MG: 1 TABLET ORAL at 08:45

## 2021-01-19 RX ADMIN — MIRTAZAPINE 45 MG: 30 TABLET, FILM COATED ORAL at 21:05

## 2021-01-19 RX ADMIN — APIXABAN 2.5 MG: 2.5 TABLET, FILM COATED ORAL at 17:01

## 2021-01-19 RX ADMIN — DICYCLOMINE HYDROCHLORIDE 20 MG: 20 TABLET ORAL at 17:01

## 2021-01-19 RX ADMIN — Medication 1 TABLET: at 08:45

## 2021-01-19 RX ADMIN — DICYCLOMINE HYDROCHLORIDE 20 MG: 20 TABLET ORAL at 11:49

## 2021-01-19 RX ADMIN — DONEPEZIL HYDROCHLORIDE 10 MG: 10 TABLET, FILM COATED ORAL at 21:05

## 2021-01-19 RX ADMIN — DICYCLOMINE HYDROCHLORIDE 20 MG: 20 TABLET ORAL at 06:04

## 2021-01-19 NOTE — PROGRESS NOTES
Pt attended 1/3 groups  Pt making slow and steady progress towards mh recovery goals         01/19/21 1330   Activity/Group Checklist   Group Other (Comment)  (positive coping)   Attendance Did not attend

## 2021-01-19 NOTE — CASE MANAGEMENT
This writer completed updated MA [facility] and PASSR  This writer faxed to [address] on Aging  This writer will follow up with Aging to determine if patient is NCFE

## 2021-01-19 NOTE — PLAN OF CARE
Problem: Depression  Goal: Treatment Goal: Demonstrate behavioral control of depressive symptoms, verbalize feelings of improved mood/affect, and adopt new coping skills prior to discharge  Outcome: Progressing  Goal: Verbalize thoughts and feelings  Description: Interventions:  - Assess and re-assess patient's level of risk   - Engage patient in 1:1 interactions, daily, for a minimum of 15 minutes   - Encourage patient to express feelings, fears, frustrations, hopes   Outcome: Progressing  Goal: Refrain from harming self  Description: Interventions:  - Monitor patient closely, per order   - Supervise medication ingestion, monitor effects and side effects   Outcome: Progressing  Goal: Refrain from isolation  Description: Interventions:  - Develop a trusting relationship   - Encourage socialization   Outcome: Progressing  Goal: Refrain from self-neglect  Outcome: Progressing  Goal: Attend and participate in unit activities, including therapeutic, recreational, and educational groups  Description: Interventions:  - Provide therapeutic and educational activities daily, encourage attendance and participation, and document same in the medical record   Outcome: Progressing  Goal: Complete daily ADLs, including personal hygiene independently, as able  Description: Interventions:  - Observe, teach, and assist patient with ADLS  -  Monitor and promote a balance of rest/activity, with adequate nutrition and elimination   Outcome: Progressing     Problem: Anxiety  Goal: Anxiety is at manageable level  Description: Interventions:  - Assess and monitor patient's anxiety level  - Monitor for signs and symptoms (heart palpitations, chest pain, shortness of breath, headaches, nausea, feeling jumpy, restlessness, irritable, apprehensive)  - Collaborate with interdisciplinary team and initiate plan and interventions as ordered    - [place] patient to unit/surroundings  - Explain treatment plan  - Encourage participation in care  - Encourage verbalization of concerns/fears  - Identify coping mechanisms  - Assist in developing anxiety-reducing skills  - Administer/offer alternative therapies  - Limit or eliminate stimulants  Outcome: Progressing     Problem: DISCHARGE PLANNING  Goal: Discharge to home or other facility with appropriate resources  Description: INTERVENTIONS:  - Identify barriers to discharge w/patient and caregiver  - Arrange for needed discharge resources and transportation as appropriate  - Identify discharge learning needs (meds, wound care, etc )  - Refer to Case Management Department for coordinating discharge planning if the patient needs post-hospital services based on physician/advanced practitioner order or complex needs related to functional status, cognitive ability, or social support system  Outcome: Progressing

## 2021-01-19 NOTE — PROGRESS NOTES
01/19/21 0908   Team Meeting   Meeting Type Daily Rounds   Team Members Present   Team Members Present Physician;Nurse;;   Physician Team Member [id number]   Nursing Team Member [address] [facility] Management Team Member [facility]   Social Work Team Member Reena Lopez   Patient/Family Present   Patient Present No   Patient's Family Present No     Patient is pleasant cooperative, she takes her medications  Housing options available if not NCFE  Family requires 1 week notice prior to discharge

## 2021-01-19 NOTE — NURSING NOTE
Patient is visible and she had no somatic preoccupation this evening  She ambulated in the hallway then would return to bed   She had no complaints and she denied SI

## 2021-01-19 NOTE — PROGRESS NOTES
Progress Note - Behavioral Health   Marysol Jerome 78 y o  female MRN: [id number]  Unit/Bed#: [facility] Payment 813-63 Encounter: [id number]    The patient was seen for continuing care and reviewed with treatment team   Staff reports the patient has been visible and social   She has been interacting appropriately and able to share in the structured group setting  Placement is pending  She reports her mood is okay and denies depression currently  She does report some anxiety and rates it at 2/4 with 4 being the worst   Her anxiety is related to discharge and feeling upset that her children are not planning on caring for her at home and instead are placing her somewhere  Sleep and appetite are good  Denies side effects to medications  Denies SI right now and says she has not had these thoughts recently at all       Severe recurrent major depression without psychotic features ([facility] [address] )    Vital signs in last 24 hours:  Temp:  [97 6 °F (36 4 °C)-97 7 °F (36 5 °C)] 97 6 °F (36 4 °C)  HR:  [] 96  Resp:  [16] 16  BP: (105-142)/(67-71) 142/68    Mental Status Evaluation:    Appearance Adequate hygiene and grooming and Good eye contact   Behavior cooperative   Mood anxious   Speech Normal rate and volume   Affect blunted   Thought Processes Goal directed and coherent   Thought Content Does not verbalize delusional material   Perceptual Disturbances Denies hallucinations and does not appear to be responding to internal stimuli   Risk Potential Suicidal/Homicidal Ideation - No evidence of suicidal or homicidal ideation and Patient does not verbalize suicidal or homicidal ideation  Risk of Violence - No evidence of risk for violence found on assessment  Risk of Self Mutilation - No evidence of risk for self mutilation found on assessment   Sensorium oriented to person and place   Cognition/Memory short term memory impaired   Consciousness alert and awake   Attention/Concentration attention span and concentration are age appropriate   Insight limited   Judgement limited   Muscle Strength and Gait/Station normal muscle strength and normal muscle tone, normal gait/station and normal balance   Motor Activity no abnormal movements       Progress Toward Goals:  Placement pending      Recommended Treatment: Continue with pharmacotherapy, group therapy, milieu therapy and occupational therapy    The patient will be maintained on the following medications:  Current Facility-Administered Medications   Medication Dose Route Frequency Provider Last Rate    acetaminophen  650 mg Oral Q6H PRN Maria M Brashear, MD      apixaban  2 5 mg Oral BID Vandana Her MD      bismuth subsalicylate  103 mg Oral Q6H PRN Fifi De León MD      cholecalciferol  2,000 Units Oral Daily Maria M Brashear, MD      dicyclomine  20 mg Oral 4x Daily (AC & HS) Fifi De León MD      donepezil  10 mg Oral HS Maria M Brashear, MD      folic acid  1 mg Oral Daily Vandana Her MD      haloperidol  0 5 mg Oral Q6H PRN Maria M Brashear, MD      haloperidol  1 mg Oral Q8H PRN Maria M Brashear, MD      haloperidol lactate  2 mg Intramuscular Q6H PRN Maria M Brashear, MD      hydrOXYzine HCL  25 mg Oral Q6H PRN Maria M Brashear, MD      loperamide  2 mg Oral 4x Daily PRN Ema Severs, MD      LORazepam  1 mg Intramuscular Q6H PRN Maria M Brashear, MD      LORazepam  1 mg Oral Q6H PRN Maria M Brashear, MD      mirtazapine  45 mg Oral HS Panda Tovar MD      multivitamin-minerals  1 tablet Oral Daily Maria M Brashear, MD      nicotine polacrilex  4 mg Oral Q2H PRN Maria M Brashear, MD      pantoprazole  20 mg Oral Early Morning Maria M Brashear, MD      thiamine  100 mg Oral Daily Vandana Her MD      traZODone  50 mg Oral HS PRN Maria M Brashear, MD         Severe recurrent major depression without psychotic features ([facility] [address] )

## 2021-01-19 NOTE — PLAN OF CARE
Problem: Depression  Goal: Refrain from isolation  Description: Interventions:  - Develop a trusting relationship   - Encourage socialization   Outcome: Progressing  Goal: Attend and participate in unit activities, including therapeutic, recreational, and educational groups  Description: Interventions:  - Provide therapeutic and educational activities daily, encourage attendance and participation, and document same in the medical record   Outcome: Progressing

## 2021-01-20 PROCEDURE — 99232 SBSQ HOSP IP/OBS MODERATE 35: CPT | Performed by: NURSE PRACTITIONER

## 2021-01-20 RX ADMIN — DICYCLOMINE HYDROCHLORIDE 20 MG: 20 TABLET ORAL at 12:28

## 2021-01-20 RX ADMIN — THIAMINE HCL TAB 100 MG 100 MG: 100 TAB at 08:50

## 2021-01-20 RX ADMIN — APIXABAN 2.5 MG: 2.5 TABLET, FILM COATED ORAL at 08:50

## 2021-01-20 RX ADMIN — Medication 1 TABLET: at 08:50

## 2021-01-20 RX ADMIN — FOLIC ACID 1 MG: 1 TABLET ORAL at 08:54

## 2021-01-20 RX ADMIN — DICYCLOMINE HYDROCHLORIDE 20 MG: 20 TABLET ORAL at 17:16

## 2021-01-20 RX ADMIN — PANTOPRAZOLE SODIUM 20 MG: 20 TABLET, DELAYED RELEASE ORAL at 06:08

## 2021-01-20 RX ADMIN — MIRTAZAPINE 45 MG: 30 TABLET, FILM COATED ORAL at 21:34

## 2021-01-20 RX ADMIN — Medication 2000 UNITS: at 08:50

## 2021-01-20 RX ADMIN — DONEPEZIL HYDROCHLORIDE 10 MG: 10 TABLET, FILM COATED ORAL at 21:34

## 2021-01-20 RX ADMIN — DICYCLOMINE HYDROCHLORIDE 20 MG: 20 TABLET ORAL at 06:08

## 2021-01-20 RX ADMIN — APIXABAN 2.5 MG: 2.5 TABLET, FILM COATED ORAL at 17:16

## 2021-01-20 RX ADMIN — DICYCLOMINE HYDROCHLORIDE 20 MG: 20 TABLET ORAL at 21:34

## 2021-01-20 NOTE — PROGRESS NOTES
Pt attended 3/3 groups  Pt able to self express and provide positive communication  Pt demonstrated positive coping skills and easily engaged  Pt spoke about her grandchildren, favorite color and favorite season  01/20/21 1330   Activity/Group Checklist   Group Other (Comment)  (positive reflection and communication)   Attendance Attended   Attendance Duration (min) 31-45   Interactions Interacted appropriately   Affect/Mood Appropriate   Goals Achieved Identified feelings; Discussed self-esteem issues; Increased hopefulness; Displayed empathy;Able to listen to others; Able to engage in interactions; Able to reflect/comment on own behavior;Able to manage/cope with feelings;Verbalized increased hopefulness; Able to self-disclose; Able to recieve feedback

## 2021-01-20 NOTE — NURSING NOTE
Patient was visible watching tv  She denied any needs  Patient is blunt in conversation  No signs or symptoms of depression or SI  Compliant with medications and mask  VSS  Patient is in bed appears to be sleeping  Monitored on q minute checks

## 2021-01-20 NOTE — CASE MANAGEMENT
This writer spoke with Lauro Nino with  [facility] to discuss updated MA51  Lauro Nino discussed the denial of the previous referral and discussed if patient can return home  This writer discussed patient's mental health   This writer discussed that patient cannot cope with living alone due to her mental health  Lauro Nino reports she will complete assessment today and if patient is not NFE, the case will be closed for further review

## 2021-01-20 NOTE — PLAN OF CARE
Problem: Depression  Goal: Verbalize thoughts and feelings  Description: Interventions:  - Assess and re-assess patient's level of risk   - Engage patient in 1:1 interactions, daily, for a minimum of 15 minutes   - Encourage patient to express feelings, fears, frustrations, hopes   Outcome: Progressing  Goal: Attend and participate in unit activities, including therapeutic, recreational, and educational groups  Description: Interventions:  - Provide therapeutic and educational activities daily, encourage attendance and participation, and document same in the medical record   Outcome: Progressing     Problem: Anxiety  Goal: Anxiety is at manageable level  Description: Interventions:  - Assess and monitor patient's anxiety level  - Monitor for signs and symptoms (heart palpitations, chest pain, shortness of breath, headaches, nausea, feeling jumpy, restlessness, irritable, apprehensive)  - Collaborate with interdisciplinary team and initiate plan and interventions as ordered    - [place] patient to unit/surroundings  - Explain treatment plan  - Encourage participation in care  - Encourage verbalization of concerns/fears  - Identify coping mechanisms  - Assist in developing anxiety-reducing skills  - Administer/offer alternative therapies  - Limit or eliminate stimulants  Outcome: Progressing

## 2021-01-20 NOTE — PROGRESS NOTES
Progress Note - Behavioral Health   Liane Ric Jerome 78 y o  female MRN: [id number]  Unit/Bed#: Beba Romero 888-13 Encounter: [id number]    The patient was seen for continuing care and reviewed with treatment team   Staff reports the patient has been visible and attending some groups  Placement is pending  On approach she says she is depressed at times and feeling a little anxious related to not knowing where she is going after discharge  She continues to feel sad because she thought her children would take her home and states but no one wants me"  She states she is no longer having suicidal thoughts    Appetite and sleep have been normal     Severe recurrent major depression without psychotic features ([facility])    Vital signs in last 24 hours:  Temp:  [97 4 °F (36 3 °C)-97 6 °F (36 4 °C)] 97 5 °F (36 4 °C)  HR:  [89-96] 96  Resp:  [16] 16  BP: (136-149)/(62-73) 149/73    Mental Status Evaluation:    Appearance Adequate hygiene and grooming and Good eye contact   Behavior calm and cooperative   Mood dysphoric   Speech Normal rate and volume   Affect blunted   Thought Processes Goal directed and coherent   Thought Content Does not verbalize delusional material   Perceptual Disturbances Denies hallucinations and does not appear to be responding to internal stimuli   Risk Potential Suicidal/Homicidal Ideation - No evidence of suicidal or homicidal ideation and Patient does not verbalize suicidal or homicidal ideation  Risk of Violence - No evidence of risk for violence found on assessment  Risk of Self Mutilation - No evidence of risk for self mutilation found on assessment   Sensorium oriented to person and place   Cognition/Memory short term memory impaired   Consciousness alert and awake   Attention/Concentration attention span and concentration appear shorter than expected for age   Insight limited   Judgement limited   Muscle Strength and Gait/Station normal muscle strength and normal muscle tone, normal gait/station and normal balance   Motor Activity no abnormal movements       Progress Toward Goals:  Placement pending      Recommended Treatment: Continue with pharmacotherapy, group therapy, milieu therapy and occupational therapy    The patient will be maintained on the following medications:  Current Facility-Administered Medications   Medication Dose Route Frequency Provider Last Rate    acetaminophen  650 mg Oral Q6H PRN Soto Kumar MD      apixaban  2 5 mg Oral BID Beto Singh MD      bismuth subsalicylate  388 mg Oral Q6H PRN Le Liang MD      cholecalciferol  2,000 Units Oral Daily Soto Kumar MD      dicyclomine  20 mg Oral 4x Daily (AC & HS) Le Liang MD      donepezil  10 mg Oral HS Soto Kumar MD      folic acid  1 mg Oral Daily Beto Singh MD      haloperidol  0 5 mg Oral Q6H PRN Soto Kumar MD      haloperidol  1 mg Oral Q8H PRN Soto Kumar MD      haloperidol lactate  2 mg Intramuscular Q6H PRN Soto Kumar MD      hydrOXYzine HCL  25 mg Oral Q6H PRN Soto Kumar MD      loperamide  2 mg Oral 4x Daily PRN Jazmín Hameed MD      LORazepam  1 mg Intramuscular Q6H PRN Soto Kumar MD      LORazepam  1 mg Oral Q6H PRN Soto Kumar MD      mirtazapine  45 mg Oral HS Martha Kent MD      multivitamin-minerals  1 tablet Oral Daily Soto Kumar MD      nicotine polacrilex  4 mg Oral Q2H PRN Soto Kumar MD      pantoprazole  20 mg Oral Early Morning Soto Kumar MD      thiamine  100 mg Oral Daily Beto Singh MD      traZODone  50 mg Oral HS PRN Soto Kumar MD         Severe recurrent major depression without psychotic features ([facility] [address] )

## 2021-01-20 NOTE — PROGRESS NOTES
01/20/21 0946   Team Meeting   Meeting Type Daily Rounds   Team Members Present   Team Members Present Physician;Nurse;;   Physician Team Member [facility]   Nursing Team Member [id number] Management Team Member Michael   Social Work Team Member EUSEBIO   Patient/Family Present   Patient Present No   Patient's Family Present No     Patient is appropriate, confused at times   Waiting on decision from [facility] on Updated referral

## 2021-01-20 NOTE — NURSING NOTE
Patient visible on the unit  Denies depression/AH/VH/SI  Anxiety 2/4 due to her ongoing discharge process  Med and meal compliant  Will continue to monitor

## 2021-01-21 PROCEDURE — 99231 SBSQ HOSP IP/OBS SF/LOW 25: CPT | Performed by: NURSE PRACTITIONER

## 2021-01-21 RX ADMIN — DICYCLOMINE HYDROCHLORIDE 20 MG: 20 TABLET ORAL at 17:21

## 2021-01-21 RX ADMIN — FOLIC ACID 1 MG: 1 TABLET ORAL at 08:29

## 2021-01-21 RX ADMIN — Medication 2000 UNITS: at 08:29

## 2021-01-21 RX ADMIN — DICYCLOMINE HYDROCHLORIDE 20 MG: 20 TABLET ORAL at 06:01

## 2021-01-21 RX ADMIN — PANTOPRAZOLE SODIUM 20 MG: 20 TABLET, DELAYED RELEASE ORAL at 06:01

## 2021-01-21 RX ADMIN — THIAMINE HCL TAB 100 MG 100 MG: 100 TAB at 08:29

## 2021-01-21 RX ADMIN — MIRTAZAPINE 45 MG: 30 TABLET, FILM COATED ORAL at 21:35

## 2021-01-21 RX ADMIN — APIXABAN 2.5 MG: 2.5 TABLET, FILM COATED ORAL at 17:20

## 2021-01-21 RX ADMIN — APIXABAN 2.5 MG: 2.5 TABLET, FILM COATED ORAL at 08:29

## 2021-01-21 RX ADMIN — DICYCLOMINE HYDROCHLORIDE 20 MG: 20 TABLET ORAL at 21:35

## 2021-01-21 RX ADMIN — DONEPEZIL HYDROCHLORIDE 10 MG: 10 TABLET, FILM COATED ORAL at 21:35

## 2021-01-21 RX ADMIN — Medication 1 TABLET: at 08:29

## 2021-01-21 RX ADMIN — DICYCLOMINE HYDROCHLORIDE 20 MG: 20 TABLET ORAL at 12:15

## 2021-01-21 NOTE — PLAN OF CARE
Problem: Depression  Goal: Verbalize thoughts and feelings  Description: Interventions:  - Assess and re-assess patient's level of risk   - Engage patient in 1:1 interactions, daily, for a minimum of 15 minutes   - Encourage patient to express feelings, fears, frustrations, hopes   Outcome: Progressing  Goal: Refrain from harming self  Description: Interventions:  - Monitor patient closely, per order   - Supervise medication ingestion, monitor effects and side effects   Outcome: Progressing  Goal: Refrain from isolation  Description: Interventions:  - Develop a trusting relationship   - Encourage socialization   Outcome: Progressing  Goal: Attend and participate in unit activities, including therapeutic, recreational, and educational groups  Description: Interventions:  - Provide therapeutic and educational activities daily, encourage attendance and participation, and document same in the medical record   Outcome: Progressing

## 2021-01-21 NOTE — NURSING NOTE
Patient visible on the unit  Pleasant cooperative  Social with selective peers   Denies depression/anxiety/suicidal ideations/AH/VH  States '' I don't know when I can leave from this place , my kids don't want me either ''   Support provided   Will continue to monitor

## 2021-01-21 NOTE — NURSING NOTE
Patient visible on the unit, social with select peers and staff when approached  Patient cooperative with assessment questions, denies depression and anxiety, SI/HI AH/VH  Patient states "I'm just waiting to go"  Patient wanders the unit, smiles at peers in passing  Patient medication and meal compliant, appetite good  VSS  Will continue to monitor frequently and provide support as needed

## 2021-01-21 NOTE — PLAN OF CARE
Problem: Risk for Self Injury/Neglect  Goal: Treatment Goal: Remain safe during length of stay, learn and adopt new coping skills, and be free of self-injurious ideation, impulses and acts at the time of discharge  Outcome: Progressing     Problem: Depression  Goal: Treatment Goal: Demonstrate behavioral control of depressive symptoms, verbalize feelings of improved mood/affect, and adopt new coping skills prior to discharge  Outcome: Progressing  Goal: Verbalize thoughts and feelings  Description: Interventions:  - Assess and re-assess patient's level of risk   - Engage patient in 1:1 interactions, daily, for a minimum of 15 minutes   - Encourage patient to express feelings, fears, frustrations, hopes   Outcome: Progressing  Goal: Refrain from harming self  Description: Interventions:  - Monitor patient closely, per order   - Supervise medication ingestion, monitor effects and side effects   Outcome: Progressing  Goal: Refrain from isolation  Description: Interventions:  - Develop a trusting relationship   - Encourage socialization   Outcome: Progressing  Goal: Refrain from self-neglect  Outcome: Progressing  Goal: Attend and participate in unit activities, including therapeutic, recreational, and educational groups  Description: Interventions:  - Provide therapeutic and educational activities daily, encourage attendance and participation, and document same in the medical record   Outcome: Progressing  Goal: Complete daily ADLs, including personal hygiene independently, as able  Description: Interventions:  - Observe, teach, and assist patient with ADLS  -  Monitor and promote a balance of rest/activity, with adequate nutrition and elimination   Outcome: Progressing     Problem: Anxiety  Goal: Anxiety is at manageable level  Description: Interventions:  - Assess and monitor patient's anxiety level     - Monitor for signs and symptoms (heart palpitations, chest pain, shortness of breath, headaches, nausea, feeling jumpy, restlessness, irritable, apprehensive)  - Collaborate with interdisciplinary team and initiate plan and interventions as ordered    - [place] patient to unit/surroundings  - Explain treatment plan  - Encourage participation in care  - Encourage verbalization of concerns/fears  - Identify coping mechanisms  - Assist in developing anxiety-reducing skills  - Administer/offer alternative therapies  - Limit or eliminate stimulants  Outcome: Progressing     Problem: DISCHARGE PLANNING  Goal: Discharge to home or other facility with appropriate resources  Description: INTERVENTIONS:  - Identify barriers to discharge w/patient and caregiver  - Arrange for needed discharge resources and transportation as appropriate  - Identify discharge learning needs (meds, wound care, etc )  - Refer to Case Management Department for coordinating discharge planning if the patient needs post-hospital services based on physician/advanced practitioner order or complex needs related to functional status, cognitive ability, or social support system  Outcome: Progressing     Problem: Ineffective Coping  Goal: Participates in unit activities  Description: Interventions:  - Provide therapeutic environment   - Provide required programming   - Redirect inappropriate behaviors   Outcome: Progressing

## 2021-01-21 NOTE — PROGRESS NOTES
01/21/21 0944   Team Meeting   Meeting Type Daily Rounds   Team Members Present   Team Members Present Physician;Nurse;;   Physician Team Member [facility]   Nursing Team Member [id number] Management Team Member [facility]   Social Work Team Member Lytle Goldberg   Patient/Family Present   Patient Present No   Patient's Family Present No     Patient denies depression, SI and anxiety   Waiting for NCFE determination

## 2021-01-21 NOTE — PROGRESS NOTES
Pt attended all groups  Pt visible on unit and groups  Pt does express her thoughts spontaneously and impulsively at times even if it is not agreeable with peers  01/21/21 1330   Activity/Group Checklist   Group Other (Comment)  (positive reflection: leisure activities)   Attendance Attended   Attendance Duration (min) 31-45   Interactions Interacted appropriately   Affect/Mood Appropriate   Goals Achieved Identified feelings; Discussed coping strategies; Able to listen to others; Able to engage in interactions; Able to reflect/comment on own behavior

## 2021-01-21 NOTE — NURSING NOTE
Patient is pleasant and cooperative with the care and medications  Patient is visible on the unit and sociable with selected peers  Denies depression  And anxiety is 1/4  Denies SI,HI

## 2021-01-21 NOTE — PROGRESS NOTES
Progress Note - Behavioral Health   Liane Ric Jerome 78 y o  female MRN: [id number]  Unit/Bed#: Beba Romero 602-23 Encounter: [id number]    The patient was seen for continuing care and reviewed with treatment team   Staff reports the patient has been visible and social with staff and select peers  She has been attending groups and interacting appropriately  Placement is pending  She reports her mood is okay and rates her current depression as 2/10 with 10 being the worst and anxiety as 1/4 with 4 being the worst   States her appetite and sleep have been fine  Denies side effects to medications  Denies SI "not now"        Severe recurrent major depression without psychotic features ([facility] [address] )    Vital signs in last 24 hours:  Temp:  [96 9 °F (36 1 °C)-97 5 °F (36 4 °C)] 96 9 °F (36 1 °C)  HR:  [86-87] 87  Resp:  [16] 16  BP: (140-141)/(72-77) 140/77    Mental Status Evaluation:    Appearance Adequate hygiene and grooming and Good eye contact   Behavior calm and cooperative   Mood anxious, depressed and improving   Speech Normal rate and volume   Affect appropriate   Thought Processes Goal directed and coherent   Thought Content Does not verbalize delusional material   Perceptual Disturbances Denies hallucinations and does not appear to be responding to internal stimuli   Risk Potential Suicidal/Homicidal Ideation - No evidence of suicidal or homicidal ideation and Patient does not verbalize suicidal or homicidal ideation  Risk of Violence - No evidence of risk for violence found on assessment  Risk of Self Mutilation - No evidence of risk for self mutilation found on assessment   Sensorium oriented to person and place   Cognition/Memory short term memory impaired   Consciousness alert and awake   Attention/Concentration attention span and concentration appear shorter than expected for age   Insight limited   Judgement limited   Muscle Strength and Gait/Station normal muscle strength and normal muscle tone, normal gait/station and normal balance   Motor Activity no abnormal movements       Progress Toward Goals:  Placement pending      Recommended Treatment: Continue with pharmacotherapy, group therapy, milieu therapy and occupational therapy    The patient will be maintained on the following medications:  Current Facility-Administered Medications   Medication Dose Route Frequency Provider Last Rate    acetaminophen  650 mg Oral Q6H PRN Joe King MD      apixaban  2 5 mg Oral BID Krys Daugherty MD      bismuth subsalicylate  610 mg Oral Q6H PRN Janee Stephenson MD      cholecalciferol  2,000 Units Oral Daily Joe King MD      dicyclomine  20 mg Oral 4x Daily (AC & HS) Janee Stephenson MD      donepezil  10 mg Oral HS Joe King MD      folic acid  1 mg Oral Daily Krys Daugherty MD      haloperidol  0 5 mg Oral Q6H PRN Joe King MD      haloperidol  1 mg Oral Q8H PRN Joe King MD      haloperidol lactate  2 mg Intramuscular Q6H PRN Joe King MD      hydrOXYzine HCL  25 mg Oral Q6H PRN Joe King MD      loperamide  2 mg Oral 4x Daily PRN Sharif Griffith MD      LORazepam  1 mg Intramuscular Q6H PRN Joe King MD      LORazepam  1 mg Oral Q6H PRN Joe King MD      mirtazapine  45 mg Oral HS Vicente Whitley MD      multivitamin-minerals  1 tablet Oral Daily Joe King MD      nicotine polacrilex  4 mg Oral Q2H PRN Joe King MD      pantoprazole  20 mg Oral Early Morning Joe King MD      thiamine  100 mg Oral Daily Krys Daugherty MD      traZODone  50 mg Oral HS PRN Joe King MD         Severe recurrent major depression without psychotic features ([facility] [address] )

## 2021-01-21 NOTE — PLAN OF CARE
Problem: Risk for Self Injury/Neglect  Goal: Treatment Goal: Remain safe during length of stay, learn and adopt new coping skills, and be free of self-injurious ideation, impulses and acts at the time of discharge  Outcome: Progressing     Problem: Depression  Goal: Treatment Goal: Demonstrate behavioral control of depressive symptoms, verbalize feelings of improved mood/affect, and adopt new coping skills prior to discharge  Outcome: Progressing  Goal: Verbalize thoughts and feelings  Description: Interventions:  - Assess and re-assess patient's level of risk   - Engage patient in 1:1 interactions, daily, for a minimum of 15 minutes   - Encourage patient to express feelings, fears, frustrations, hopes   Outcome: Progressing  Goal: Refrain from harming self  Description: Interventions:  - Monitor patient closely, per order   - Supervise medication ingestion, monitor effects and side effects   Outcome: Progressing  Goal: Refrain from isolation  Description: Interventions:  - Develop a trusting relationship   - Encourage socialization   Outcome: Progressing  Goal: Refrain from self-neglect  Outcome: Progressing  Goal: Attend and participate in unit activities, including therapeutic, recreational, and educational groups  Description: Interventions:  - Provide therapeutic and educational activities daily, encourage attendance and participation, and document same in the medical record   Outcome: Progressing  Goal: Complete daily ADLs, including personal hygiene independently, as able  Description: Interventions:  - Observe, teach, and assist patient with ADLS  -  Monitor and promote a balance of rest/activity, with adequate nutrition and elimination   Outcome: Progressing     Problem: Anxiety  Goal: Anxiety is at manageable level  Description: Interventions:  - Assess and monitor patient's anxiety level     - Monitor for signs and symptoms (heart palpitations, chest pain, shortness of breath, headaches, nausea, feeling jumpy, restlessness, irritable, apprehensive)  - Collaborate with interdisciplinary team and initiate plan and interventions as ordered    - [place] patient to unit/surroundings  - Explain treatment plan  - Encourage participation in care  - Encourage verbalization of concerns/fears  - Identify coping mechanisms  - Assist in developing anxiety-reducing skills  - Administer/offer alternative therapies  - Limit or eliminate stimulants  Outcome: Progressing     Problem: Ineffective Coping  Goal: Participates in unit activities  Description: Interventions:  - Provide therapeutic environment   - Provide required programming   - Redirect inappropriate behaviors   Outcome: Progressing

## 2021-01-22 PROCEDURE — 99232 SBSQ HOSP IP/OBS MODERATE 35: CPT | Performed by: NURSE PRACTITIONER

## 2021-01-22 RX ADMIN — FOLIC ACID 1 MG: 1 TABLET ORAL at 08:51

## 2021-01-22 RX ADMIN — BISMUTH SUBSALICYLATE 524 MG: 262 LIQUID ORAL at 14:08

## 2021-01-22 RX ADMIN — PANTOPRAZOLE SODIUM 20 MG: 20 TABLET, DELAYED RELEASE ORAL at 06:15

## 2021-01-22 RX ADMIN — THIAMINE HCL TAB 100 MG 100 MG: 100 TAB at 08:52

## 2021-01-22 RX ADMIN — DICYCLOMINE HYDROCHLORIDE 20 MG: 20 TABLET ORAL at 06:15

## 2021-01-22 RX ADMIN — Medication 2000 UNITS: at 08:51

## 2021-01-22 RX ADMIN — MIRTAZAPINE 45 MG: 30 TABLET, FILM COATED ORAL at 21:25

## 2021-01-22 RX ADMIN — APIXABAN 2.5 MG: 2.5 TABLET, FILM COATED ORAL at 08:51

## 2021-01-22 RX ADMIN — DONEPEZIL HYDROCHLORIDE 10 MG: 10 TABLET, FILM COATED ORAL at 21:25

## 2021-01-22 RX ADMIN — Medication 1 TABLET: at 08:51

## 2021-01-22 RX ADMIN — DICYCLOMINE HYDROCHLORIDE 20 MG: 20 TABLET ORAL at 21:25

## 2021-01-22 RX ADMIN — APIXABAN 2.5 MG: 2.5 TABLET, FILM COATED ORAL at 17:14

## 2021-01-22 RX ADMIN — DICYCLOMINE HYDROCHLORIDE 20 MG: 20 TABLET ORAL at 16:14

## 2021-01-22 RX ADMIN — DICYCLOMINE HYDROCHLORIDE 20 MG: 20 TABLET ORAL at 11:47

## 2021-01-22 NOTE — PLAN OF CARE
Problem: Depression  Goal: Verbalize thoughts and feelings  Description: Interventions:  - Assess and re-assess patient's level of risk   - Engage patient in 1:1 interactions, daily, for a minimum of 15 minutes   - Encourage patient to express feelings, fears, frustrations, hopes   Outcome: Progressing  Goal: Refrain from isolation  Description: Interventions:  - Develop a trusting relationship   - Encourage socialization   Outcome: Progressing     Problem: Ineffective Coping  Goal: Participates in unit activities  Description: Interventions:  - Provide therapeutic environment   - Provide required programming   - Redirect inappropriate behaviors   Outcome: Progressing

## 2021-01-22 NOTE — NURSING NOTE
Pt irritable because she had to wait a few minutes to receive her Pepto Bismal   Pt making snide comments to other patients about nursing staff taking too long with medications  RN explained  that she was busy with another patient, but this was not acceptable to pt  Pt then asked for pepto bismal again less than two hours later and was angry when RN told her no  Will continue to monitor

## 2021-01-22 NOTE — NURSING NOTE
Pt visible on unit, social with select peers  Pt appears quiet and withdrawn  Med and meal compliant  Denies all signs and symptoms  Will continue to monitor

## 2021-01-22 NOTE — CASE MANAGEMENT
This writer received notification from [facility] [place] [facility], patient is NCFE  This writer called patient's daughter Moe Disla and informed her of decision from [facility]  Moe Disla requested to speak with her siblings to decide if patient will be referred to [address] [place] [address] or [facility]  This writer discussed, the referral to [facility] is contingent on bed availability  This writer discussed if [facility] does not have a bed bed available patient will have to be referred to [facility] because the patient's cleared for discharge  Moe Disla will discuss with her siblings and follow up with decision on Monday

## 2021-01-22 NOTE — PROGRESS NOTES
Progress Note - Behavioral Health   Destiney Galindo Stump 78 y o  female MRN: [id number]  Unit/Bed#: Neymar Don 599-27 Encounter: [id number]    The patient was seen for continuing care and reviewed with treatment team    Staff notes the patient has been visible and social with select peers  She has been attending groups  She is sleeping through the night  Placement is pending  She is pleasant on approach  She denies depression and anxiety  She denies issues with sleep or appetite  Denies side effects to medications  Denies SI         Severe recurrent major depression without psychotic features ([facility] )    Vital signs in last 24 hours:  Temp:  [97 5 °F (36 4 °C)-97 8 °F (36 6 °C)] 97 6 °F (36 4 °C)  HR:  [87-94] 87  Resp:  [16-18] 18  BP: (125-134)/(59-77) 133/77    Mental Status Evaluation:    Appearance Adequate hygiene and grooming and Good eye contact   Behavior calm and cooperative   Mood euthymic   Speech Normal rate and volume   Affect appropriate   Thought Processes Goal directed and coherent   Thought Content Does not verbalize delusional material   Perceptual Disturbances Denies hallucinations and does not appear to be responding to internal stimuli   Risk Potential Suicidal/Homicidal Ideation - No evidence of suicidal or homicidal ideation and Patient does not verbalize suicidal or homicidal ideation  Risk of Violence - No evidence of risk for violence found on assessment  Risk of Self Mutilation - No evidence of risk for self mutilation found on assessment   Sensorium oriented to person and place   Cognition/Memory short term memory impaired   Consciousness alert and awake   Attention/Concentration attention span and concentration appear shorter than expected for age   Insight limited   Judgement limited   Muscle Strength and Gait/Station normal muscle strength and normal muscle tone, normal gait/station and normal balance   Motor Activity no abnormal movements       Progress Toward Goals:  Placement pending      Recommended Treatment: Continue with pharmacotherapy, group therapy, milieu therapy and occupational therapy    The patient will be maintained on the following medications:  Current Facility-Administered Medications   Medication Dose Route Frequency Provider Last Rate    acetaminophen  650 mg Oral Q6H PRN Amaury Dumont, MD      apixaban  2 5 mg Oral BID Michelet Tran MD      bismuth subsalicylate  784 mg Oral Q6H PRN Raphael Pinto MD      cholecalciferol  2,000 Units Oral Daily Amaury Dumont, MD      dicyclomine  20 mg Oral 4x Daily (AC & HS) Raphael Pinto MD      donepezil  10 mg Oral HS Amaury Dumont, MD      folic acid  1 mg Oral Daily Michelet Tran MD      haloperidol  0 5 mg Oral Q6H PRN Amaury Matter, MD      haloperidol  1 mg Oral Q8H PRN Amaury Matter, MD      haloperidol lactate  2 mg Intramuscular Q6H PRN Amaury Matter, MD      hydrOXYzine HCL  25 mg Oral Q6H PRN Amaury Matter, MD      loperamide  2 mg Oral 4x Daily PRN Phyllis Srinivasan MD      LORazepam  1 mg Intramuscular Q6H PRN Amaury Matter, MD      LORazepam  1 mg Oral Q6H PRN Amaury Dumont, MD      mirtazapine  45 mg Oral HS Elvie Narayanan MD      multivitamin-minerals  1 tablet Oral Daily Amaury Dumont, MD      nicotine polacrilex  4 mg Oral Q2H PRN Amaury Matter, MD      pantoprazole  20 mg Oral Early Morning Amaury Dumont, MD      thiamine  100 mg Oral Daily Michelet Tran MD      traZODone  50 mg Oral HS PRN Amaury Dumont, MD         Severe recurrent major depression without psychotic features ([facility] [address] )

## 2021-01-23 RX ADMIN — FOLIC ACID 1 MG: 1 TABLET ORAL at 08:09

## 2021-01-23 RX ADMIN — APIXABAN 2.5 MG: 2.5 TABLET, FILM COATED ORAL at 17:19

## 2021-01-23 RX ADMIN — LOPERAMIDE HYDROCHLORIDE 2 MG: 2 CAPSULE ORAL at 18:17

## 2021-01-23 RX ADMIN — DICYCLOMINE HYDROCHLORIDE 20 MG: 20 TABLET ORAL at 11:24

## 2021-01-23 RX ADMIN — DICYCLOMINE HYDROCHLORIDE 20 MG: 20 TABLET ORAL at 16:32

## 2021-01-23 RX ADMIN — MIRTAZAPINE 45 MG: 30 TABLET, FILM COATED ORAL at 21:05

## 2021-01-23 RX ADMIN — Medication 2000 UNITS: at 08:08

## 2021-01-23 RX ADMIN — THIAMINE HCL TAB 100 MG 100 MG: 100 TAB at 08:08

## 2021-01-23 RX ADMIN — APIXABAN 2.5 MG: 2.5 TABLET, FILM COATED ORAL at 08:09

## 2021-01-23 RX ADMIN — Medication 1 TABLET: at 08:08

## 2021-01-23 RX ADMIN — DONEPEZIL HYDROCHLORIDE 10 MG: 10 TABLET, FILM COATED ORAL at 21:05

## 2021-01-23 RX ADMIN — DICYCLOMINE HYDROCHLORIDE 20 MG: 20 TABLET ORAL at 06:05

## 2021-01-23 RX ADMIN — PANTOPRAZOLE SODIUM 20 MG: 20 TABLET, DELAYED RELEASE ORAL at 06:05

## 2021-01-23 RX ADMIN — DICYCLOMINE HYDROCHLORIDE 20 MG: 20 TABLET ORAL at 21:05

## 2021-01-23 NOTE — NURSING NOTE
Pt visible on unit, social with select peers  Med and meal compliant  Pt was pacing hallway when RN told her that she had meds due  Patient asked what the pills were for and RN told her that Eliquis was to prevent blood clots and Bentyl is for IBS  Pt asked what irritable bowel syndrome was  RN was surprised by this because pt is preoccupied with her IBS and stools  Denies pain  Denies all signs and symptoms  Will continue to monitor

## 2021-01-23 NOTE — NURSING NOTE
Patient was visible on the unit  She was cooperative  She denied any needs  Complaint with medications  VSS  Monitored on q 7 minute checks

## 2021-01-23 NOTE — PROGRESS NOTES
Psychiatry Progress Note    Subjective: Interval History     Patient is seen resting in bed this morning  She is oriented to person  She is disoriented to place and time  Patient reports she is depressed once in a while  She states she is anxious at times  Staff states she did sleep well  She has been medication and meal compliant  Patient has been visible on the unit and has been attending groups  Patient denies SI, hallucinations  She offers no concerns this morning  Placement is pending      Behavior over the last 24 hours:  unchanged  Sleep: normal  Appetite: normal  Medication side effects: No  ROS: no complaints    Current medications:    Current Facility-Administered Medications:     acetaminophen (TYLENOL) tablet 650 mg, 650 mg, Oral, Q6H PRN, Shaista Denny MD, 650 mg at 01/08/21 1118    apixaban (ELIQUIS) tablet 2 5 mg, 2 5 mg, Oral, BID, Miriam Carrasquillo MD, 2 5 mg at 01/23/21 0809    bismuth subsalicylate (PEPTO BISMOL) oral suspension 524 mg, 524 mg, Oral, Q6H PRN, Sondra Rob MD, 524 mg at 01/22/21 1408    cholecalciferol (VITAMIN D3) tablet 2,000 Units, 2,000 Units, Oral, Daily, Shaista Denny MD, 2,000 Units at 01/23/21 0808    dicyclomine (BENTYL) tablet 20 mg, 20 mg, Oral, 4x Daily (AC & HS), Sondra Rob MD, 20 mg at 01/23/21 0605    donepezil (ARICEPT) tablet 10 mg, 10 mg, Oral, HS, Shaista Denny MD, 10 mg at 30/32/66 0062    folic acid (FOLVITE) tablet 1 mg, 1 mg, Oral, Daily, Miriam Carrasquillo MD, 1 mg at 01/23/21 0809    haloperidol (HALDOL) tablet 0 5 mg, 0 5 mg, Oral, Q6H PRN, Shaista Denny MD    haloperidol (HALDOL) tablet 1 mg, 1 mg, Oral, Q8H PRN, Shaista Denny MD    haloperidol lactate (HALDOL) injection 2 mg, 2 mg, Intramuscular, Q6H PRN, Shaista Denny MD    hydrOXYzine HCL (ATARAX) tablet 25 mg, 25 mg, Oral, Q6H PRN, Shaista Denny MD    loperamide (IMODIUM) capsule 2 mg, 2 mg, Oral, 4x Daily PRN, Aileen Muñiz MD, 2 mg at 01/17/21 1725    LORazepam (ATIVAN) injection 1 mg, 1 mg, Intramuscular, Q6H PRN, Jacobo Ortega MD    LORazepam (ATIVAN) tablet 1 mg, 1 mg, Oral, Q6H PRN, Jacobo Ortega MD    mirtazapine (REMERON) tablet 45 mg, 45 mg, Oral, HS, Margarita Cunningham MD, 45 mg at 01/22/21 2125    multivitamin-minerals (CENTRUM) tablet 1 tablet, 1 tablet, Oral, Daily, Jacobo Ortega MD, 1 tablet at 01/23/21 0808    nicotine polacrilex (NICORETTE) gum 4 mg, 4 mg, Oral, Q2H PRN, Jacobo Ortega MD    pantoprazole (PROTONIX) EC tablet 20 mg, 20 mg, Oral, Early Morning, Jacobo Ortega MD, 20 mg at 01/23/21 0605    thiamine (VITAMIN B1) tablet 100 mg, 100 mg, Oral, Daily, Gary Yen MD, 100 mg at 01/23/21 0808    traZODone (DESYREL) tablet 50 mg, 50 mg, Oral, HS PRN, Jacobo Ortega MD    Current Problem List:    Patient Active Problem List   Diagnosis    Hyponatremia    [facility]    COVID-19 virus infection    Suicidal ideation    Alcohol abuse    Multiple falls    History of fall    Depression with suicidal ideation    History of alcohol abuse    [facility]cognitive disorder [facility] )    Anxiety    Anemia of chronic disease    Irritable bowel syndrome with diarrhea       Problem list reviewed 01/23/21     Objective:     Vital Signs:  Vitals:    01/22/21 0627 01/22/21 1517 01/22/21 2139 01/23/21 0628   BP: 133/77 116/63 126/56 129/74   BP Location: Right arm Right arm Right arm Right arm   Pulse: 87 93 90 84   Resp: 18 16 18 18   Temp: 97 6 °F (36 4 °C) (!) 97 4 °F (36 3 °C) 97 7 °F (36 5 °C) 97 6 °F (36 4 °C)   TempSrc: Temporal Temporal Temporal Temporal   SpO2: 92% 93% 93% 98%   Weight:       Height:             Appearance:  age appropriate and casually dressed   Behavior:  normal   Speech:  normal volume   Mood:  normal   Affect:  normal   Thought Process:  goal directed   Thought Content:  normal   Perceptual Disturbances: None   Risk Potential: none   Sensorium:  person   Cognition:  short term memory impaired   Consciousness:  alert and awake    Attention: attention span and concentration were age appropriate   Intellect: average   Insight:  limited   Judgment: limited      Motor Activity: no abnormal movements       I/O Past 24 hours:  I/O last 3 completed shifts: In: 960 [P O :960]  Out: -   No intake/output data recorded  Labs:  Reviewed 01/23/21      Assessment / Plan:     Severe recurrent major depression without psychotic features ([facility] [address] )    Recommended Treatment:      Medication changes:  1) continue current medication regimen  Non-pharmacological treatments  1) Continue with group therapy, milieu therapy and occupational therapy  Safety  1) Safety/communication plan established targeting dynamic risk factors above  2) Risks, benefits, and possible side effects of medications explained to patient and patient verbalizes understanding  Counseling / Coordination of Care    Total floor / unit time spent today 20 minutes  Greater than 50% of total time was spent with the patient and / or family counseling and / or coordination of care  A description of the counseling / coordination of care  Patient's Rights, confidentiality and exceptions to confidentiality, use of automated medical record, [facility] staff access to medical record, and consent to treatment reviewed      Vilma Matos PA-C

## 2021-01-24 PROCEDURE — 99232 SBSQ HOSP IP/OBS MODERATE 35: CPT | Performed by: NURSE PRACTITIONER

## 2021-01-24 RX ADMIN — DICYCLOMINE HYDROCHLORIDE 20 MG: 20 TABLET ORAL at 21:10

## 2021-01-24 RX ADMIN — Medication 1 TABLET: at 08:06

## 2021-01-24 RX ADMIN — DICYCLOMINE HYDROCHLORIDE 20 MG: 20 TABLET ORAL at 06:04

## 2021-01-24 RX ADMIN — FOLIC ACID 1 MG: 1 TABLET ORAL at 08:06

## 2021-01-24 RX ADMIN — MIRTAZAPINE 45 MG: 30 TABLET, FILM COATED ORAL at 21:11

## 2021-01-24 RX ADMIN — DONEPEZIL HYDROCHLORIDE 10 MG: 10 TABLET, FILM COATED ORAL at 21:11

## 2021-01-24 RX ADMIN — APIXABAN 2.5 MG: 2.5 TABLET, FILM COATED ORAL at 08:05

## 2021-01-24 RX ADMIN — THIAMINE HCL TAB 100 MG 100 MG: 100 TAB at 08:05

## 2021-01-24 RX ADMIN — Medication 2000 UNITS: at 08:05

## 2021-01-24 RX ADMIN — APIXABAN 2.5 MG: 2.5 TABLET, FILM COATED ORAL at 17:01

## 2021-01-24 RX ADMIN — PANTOPRAZOLE SODIUM 20 MG: 20 TABLET, DELAYED RELEASE ORAL at 06:04

## 2021-01-24 RX ADMIN — DICYCLOMINE HYDROCHLORIDE 20 MG: 20 TABLET ORAL at 11:12

## 2021-01-24 RX ADMIN — BISMUTH SUBSALICYLATE 524 MG: 262 LIQUID ORAL at 15:26

## 2021-01-24 RX ADMIN — DICYCLOMINE HYDROCHLORIDE 20 MG: 20 TABLET ORAL at 15:00

## 2021-01-24 NOTE — PLAN OF CARE
Problem: Risk for Self Injury/Neglect  Goal: Treatment Goal: Remain safe during length of stay, learn and adopt new coping skills, and be free of self-injurious ideation, impulses and acts at the time of discharge  Outcome: Progressing     Problem: Depression  Goal: Treatment Goal: Demonstrate behavioral control of depressive symptoms, verbalize feelings of improved mood/affect, and adopt new coping skills prior to discharge  Outcome: Progressing  Goal: Verbalize thoughts and feelings  Description: Interventions:  - Assess and re-assess patient's level of risk   - Engage patient in 1:1 interactions, daily, for a minimum of 15 minutes   - Encourage patient to express feelings, fears, frustrations, hopes   Outcome: Progressing  Goal: Refrain from harming self  Description: Interventions:  - Monitor patient closely, per order   - Supervise medication ingestion, monitor effects and side effects   Outcome: Progressing  Goal: Refrain from isolation  Description: Interventions:  - Develop a trusting relationship   - Encourage socialization   Outcome: Progressing  Goal: Refrain from self-neglect  Outcome: Progressing  Goal: Attend and participate in unit activities, including therapeutic, recreational, and educational groups  Description: Interventions:  - Provide therapeutic and educational activities daily, encourage attendance and participation, and document same in the medical record   Outcome: Progressing  Goal: Complete daily ADLs, including personal hygiene independently, as able  Description: Interventions:  - Observe, teach, and assist patient with ADLS  -  Monitor and promote a balance of rest/activity, with adequate nutrition and elimination   Outcome: Progressing     Problem: Anxiety  Goal: Anxiety is at manageable level  Description: Interventions:  - Assess and monitor patient's anxiety level     - Monitor for signs and symptoms (heart palpitations, chest pain, shortness of breath, headaches, nausea, feeling jumpy, restlessness, irritable, apprehensive)  - Collaborate with interdisciplinary team and initiate plan and interventions as ordered    - [place] patient to unit/surroundings  - Explain treatment plan  - Encourage participation in care  - Encourage verbalization of concerns/fears  - Identify coping mechanisms  - Assist in developing anxiety-reducing skills  - Administer/offer alternative therapies  - Limit or eliminate stimulants  Outcome: Progressing

## 2021-01-24 NOTE — NURSING NOTE
Patient is medication and meal compliant  No complaints of pain or discomfort  Patient rates her depression at 2/10 and anxiety at 2/4  Patient is visible on the unit and interacts with select peers at times  Patient denies current suicidal ideation and no attempts at self harm noted  Patient is currently awaiting placement at either a skilled nursing facility or an [facility] per case management documentation  No new issues or concerns noted at this time  Will continue to monitor patient for changes in mood or behavior

## 2021-01-24 NOTE — PLAN OF CARE
Problem: Risk for Self Injury/Neglect  Goal: Treatment Goal: Remain safe during length of stay, learn and adopt new coping skills, and be free of self-injurious ideation, impulses and acts at the time of discharge  Outcome: Progressing     Problem: Depression  Goal: Treatment Goal: Demonstrate behavioral control of depressive symptoms, verbalize feelings of improved mood/affect, and adopt new coping skills prior to discharge  Outcome: Progressing  Goal: Verbalize thoughts and feelings  Description: Interventions:  - Assess and re-assess patient's level of risk   - Engage patient in 1:1 interactions, daily, for a minimum of 15 minutes   - Encourage patient to express feelings, fears, frustrations, hopes   Outcome: Progressing  Goal: Refrain from harming self  Description: Interventions:  - Monitor patient closely, per order   - Supervise medication ingestion, monitor effects and side effects   Outcome: Progressing  Goal: Refrain from isolation  Description: Interventions:  - Develop a trusting relationship   - Encourage socialization   Outcome: Progressing  Goal: Refrain from self-neglect  Outcome: Progressing  Goal: Attend and participate in unit activities, including therapeutic, recreational, and educational groups  Description: Interventions:  - Provide therapeutic and educational activities daily, encourage attendance and participation, and document same in the medical record   Outcome: Progressing  Goal: Complete daily ADLs, including personal hygiene independently, as able  Description: Interventions:  - Observe, teach, and assist patient with ADLS  -  Monitor and promote a balance of rest/activity, with adequate nutrition and elimination   Outcome: Progressing     Problem: Anxiety  Goal: Anxiety is at manageable level  Description: Interventions:  - Assess and monitor patient's anxiety level     - Monitor for signs and symptoms (heart palpitations, chest pain, shortness of breath, headaches, nausea, feeling jumpy, restlessness, irritable, apprehensive)  - Collaborate with interdisciplinary team and initiate plan and interventions as ordered    - [place] patient to unit/surroundings  - Explain treatment plan  - Encourage participation in care  - Encourage verbalization of concerns/fears  - Identify coping mechanisms  - Assist in developing anxiety-reducing skills  - Administer/offer alternative therapies  - Limit or eliminate stimulants  Outcome: Progressing

## 2021-01-25 PROCEDURE — 99232 SBSQ HOSP IP/OBS MODERATE 35: CPT | Performed by: NURSE PRACTITIONER

## 2021-01-25 RX ADMIN — DICYCLOMINE HYDROCHLORIDE 20 MG: 20 TABLET ORAL at 21:44

## 2021-01-25 RX ADMIN — PANTOPRAZOLE SODIUM 20 MG: 20 TABLET, DELAYED RELEASE ORAL at 06:12

## 2021-01-25 RX ADMIN — DONEPEZIL HYDROCHLORIDE 10 MG: 10 TABLET, FILM COATED ORAL at 21:44

## 2021-01-25 RX ADMIN — DICYCLOMINE HYDROCHLORIDE 20 MG: 20 TABLET ORAL at 06:12

## 2021-01-25 RX ADMIN — DICYCLOMINE HYDROCHLORIDE 20 MG: 20 TABLET ORAL at 11:31

## 2021-01-25 RX ADMIN — FOLIC ACID 1 MG: 1 TABLET ORAL at 08:00

## 2021-01-25 RX ADMIN — Medication 1 TABLET: at 08:00

## 2021-01-25 RX ADMIN — APIXABAN 2.5 MG: 2.5 TABLET, FILM COATED ORAL at 08:00

## 2021-01-25 RX ADMIN — MIRTAZAPINE 45 MG: 30 TABLET, FILM COATED ORAL at 21:44

## 2021-01-25 RX ADMIN — APIXABAN 2.5 MG: 2.5 TABLET, FILM COATED ORAL at 17:00

## 2021-01-25 RX ADMIN — Medication 2000 UNITS: at 08:00

## 2021-01-25 RX ADMIN — DICYCLOMINE HYDROCHLORIDE 20 MG: 20 TABLET ORAL at 15:00

## 2021-01-25 RX ADMIN — THIAMINE HCL TAB 100 MG 100 MG: 100 TAB at 08:00

## 2021-01-25 NOTE — PLAN OF CARE
Pt  Spontaneously joined 3 of 3 groups  Is social with peers and remembers their names when seated in the TV room  Superficial in coping discussions    Problem: Ineffective Coping  Goal: Participates in unit activities  Description: Interventions:  - Provide therapeutic environment   - Provide required programming   - Redirect inappropriate behaviors   Outcome: Progressing

## 2021-01-25 NOTE — PROGRESS NOTES
01/25/21 0932   Team Meeting   Meeting Type Daily Rounds   Team Members Present   Team Members Present Physician;Nurse;;   Nursing Team Member [id number] [facility] Team Member [facility]   Social Work Team Member EUSEBIO   Patient/Family Present   Patient Present No   Patient's Family Present No     Patient denies all symptoms, eating, compliant with medications  Patient is NCFE, this writer will follow up with daughter regarding placement  Physical Therapy Daily Treatment      Visit Count: 16    Plan of Care: 11/16/2018 Through: 2/8/2019  Insurance Information: physical and speech therapy combined cap of $2010 per calendar year, $0 used at the time of evaluation  Referred by: Epi Vasquez MD; Next provider visit (if known/scheduled): TBD  Medical Diagnosis (from order):  Degenerative disc disease, cervical [M50.30]  - Primary; Neural foraminal stenosis of cervical spine [M99.81]; 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   Current Pain (0-10 scale): 1-2 cervical; 3 for dizziness  Functional Change: Feels his neck is the best it has been in a very long time.     OBJECTIVE     Cervical Range of Motion (degrees)  Date Initial  12/18/18 1/18/19   Flexion (80-90) 25 tightness 36 tightness 31   Extension (70) 36* 34* 48   Lateral Flexion Left (20-45) 14 18    Lateral Flexion Right (20-45) 9* 11*    Rotation Left (70-90)  25 37 60   Rotation Right (70-90)  24 31 42   standard testing positions unless otherwise noted; ranges are reported in active range of motion unless noted AA=active assistive range of motion and P=passive range of motion; norms included in (); * =pain          Treatment   Manual Therapy:   Soft tissue mobilization to bilateral upper trapezii, levator scapulae and left cervical paraspinals in sitting and supine  Sub occipital release in supine  Gentle cervical rotation and lateral flexion stretching in supine  Cervical rotational mobilization in supine    Therapeutic Exercise:   NuStep  level 5 x 10 minutes  Scapular retraction with red theraband x 10  Wall crawls in scaption x 10 bilaterally     Home Program:   Cervical active range of motion all directions x 10 each  Scapular retraction with red theraband x 10  Wall crawls in scaption x 10 bilaterally   Supine chin tucks     Writer verbally educated the patient and received verbal consent from the patient on hand placement, positioning of patient, and techniques to be performed today including therapeutic exercises and manual therapies and how they are pertinent to the patient's plan of care.      Suggestions for next session as indicated: progress per plan of care, 1-2 more sessions prior to discharge for cervical spine.     ASSESSMENT   Initiated gentle shoulder strengthening this date, which patient tolerated well. Overall noted to have significantly improved cervical range of motion, noting exceptional improvements with cervical extension and left rotation.   Pain after treatment (patient reported, 0-10 scale): 0 cervical  Result of above outlined education: Verbalizes understanding and Needs reinforcement        THERAPY DAILY BILLING   Insurance: MEDICARE 2. N/A    Evaluation Procedures:  No evaluation codes were used on this date of service    Timed Procedures:  Manual Therapy, 24 minutes  Therapeutic Exercise, 15 minutes    Untimed Procedures:   No untimed codes were used on this date of service    Total Treatment Time: 39 minutes    Referring provider signature on file

## 2021-01-25 NOTE — NURSING NOTE
Patient is medication and meal compliant  No complaints of pain or discomfort  Patient is visible on the unit and walks up and down the hallway frequently stating she always walked a lot at home to stay healthy  Patient attended two groups without difficulty  Patient rates her depression at 2/10 and anxiety at 1/10  Patient denies suicidal ideation and no attempts at self harm noted  Patient is currently awaiting placement and case management is working on same  No new concerns or issues noted at this time  Will continue to monitor patient for changes in mood or behavior

## 2021-01-25 NOTE — PLAN OF CARE
Problem: Risk for Self Injury/Neglect  Goal: Treatment Goal: Remain safe during length of stay, learn and adopt new coping skills, and be free of self-injurious ideation, impulses and acts at the time of discharge  Outcome: Progressing     Problem: Depression  Goal: Treatment Goal: Demonstrate behavioral control of depressive symptoms, verbalize feelings of improved mood/affect, and adopt new coping skills prior to discharge  Outcome: Progressing  Goal: Verbalize thoughts and feelings  Description: Interventions:  - Assess and re-assess patient's level of risk   - Engage patient in 1:1 interactions, daily, for a minimum of 15 minutes   - Encourage patient to express feelings, fears, frustrations, hopes   Outcome: Progressing  Goal: Refrain from harming self  Description: Interventions:  - Monitor patient closely, per order   - Supervise medication ingestion, monitor effects and side effects   Outcome: Progressing  Goal: Refrain from isolation  Description: Interventions:  - Develop a trusting relationship   - Encourage socialization   Outcome: Progressing  Goal: Refrain from self-neglect  Outcome: Progressing  Goal: Attend and participate in unit activities, including therapeutic, recreational, and educational groups  Description: Interventions:  - Provide therapeutic and educational activities daily, encourage attendance and participation, and document same in the medical record   Outcome: Progressing  Goal: Complete daily ADLs, including personal hygiene independently, as able  Description: Interventions:  - Observe, teach, and assist patient with ADLS  -  Monitor and promote a balance of rest/activity, with adequate nutrition and elimination   Outcome: Progressing

## 2021-01-25 NOTE — CASE MANAGEMENT
This writer spoke with patient and her daughter regarding placement for patient  Patient reports she would like to be referred to [facility]  This writer will submit  Referral through [facility]  Patient and her daughter are aware if [facility] cannot accept patient, she will have to be sent to [facility]

## 2021-01-25 NOTE — PROGRESS NOTES
Progress Note - Luz Elena BURDEN  36  Stump 78 y o  female MRN: [id number]  Unit/Bed#: [facility] Laguerre 648-01 Encounter: [id number]    The patient was seen for continuing care and reviewed with treatment team   Staff notes the patient has been denying all symptoms  She has been visible and social with select peers and staff  No issues reported with sleep or appetite  Placement is pending  She brightens on approach and says her mood is "okay" hanging in there"  She denies depression or anxiety currently  She is tolerating medications with no side effects  Denies SI      Severe recurrent major depression without psychotic features ([facility] [address] )    Vital signs in last 24 hours:  Temp:  [97 °F (36 1 °C)-98 6 °F (37 °C)] 98 6 °F (37 °C)  HR:  [88] 88  Resp:  [14-16] 14  BP: (117-134)/(67-70) 134/70    Mental Status Evaluation:    Appearance Adequate hygiene and grooming   Behavior cooperative   Mood euthymic   Speech Normal rate and volume   Affect appropriate   Thought Processes Goal directed and coherent   Thought Content Does not verbalize delusional material   Perceptual Disturbances Denies hallucinations and does not appear to be responding to internal stimuli   Risk Potential Suicidal/Homicidal Ideation - No evidence of suicidal or homicidal ideation and Patient does not verbalize suicidal or homicidal ideation  Risk of Violence - No evidence of risk for violence found on assessment  Risk of Self Mutilation - No evidence of risk for self mutilation found on assessment   Sensorium oriented to person and place   Cognition/Memory short term memory impaired   Consciousness alert and awake   Attention/Concentration attention span and concentration appear shorter than expected for age   Insight limited   Judgement limited   Muscle Strength and Gait/Station normal muscle strength and normal muscle tone, normal gait/station and normal balance   Motor Activity no abnormal movements       Progress Toward Goals:  Placement pending      Recommended Treatment: Continue with pharmacotherapy, group therapy, milieu therapy and occupational therapy    The patient will be maintained on the following medications:  Current Facility-Administered Medications   Medication Dose Route Frequency Provider Last Rate    acetaminophen  650 mg Oral Q6H PRN Godfrey Hoang, MD      apixaban  2 5 mg Oral BID Arnol Pfeiffer MD      bismuth subsalicylate  010 mg Oral Q6H PRN Noemy Laughlin MD      cholecalciferol  2,000 Units Oral Daily Godfrey Hoang, MD      dicyclomine  20 mg Oral 4x Daily (AC & HS) Noemy Laughlin MD      donepezil  10 mg Oral HS Godfrey Hoang, MD      folic acid  1 mg Oral Daily Arnol Pfeiffer MD      haloperidol  0 5 mg Oral Q6H PRN Godfrey Hoang, MD      haloperidol  1 mg Oral Q8H PRN Godfrey Hoang, MD      haloperidol lactate  2 mg Intramuscular Q6H PRN Godfrey Hoang, MD      hydrOXYzine HCL  25 mg Oral Q6H PRN Godfrey Hoang, MD      loperamide  2 mg Oral 4x Daily PRN Darcy Fillers, MD      LORazepam  1 mg Intramuscular Q6H PRN Godfrey Hoang, MD      LORazepam  1 mg Oral Q6H PRN Godfrey Hoang, MD      mirtazapine  45 mg Oral HS Stacia Pryor MD      multivitamin-minerals  1 tablet Oral Daily Godfrey Hoang, MD      nicotine polacrilex  4 mg Oral Q2H PRN Godfrey Hoang, MD      pantoprazole  20 mg Oral Early Morning Godfrey Hoang, MD      thiamine  100 mg Oral Daily Arnol Pfeiffer MD      traZODone  50 mg Oral HS PRN Godfrey Hoang, MD         Severe recurrent major depression without psychotic features ([facility] [address] )

## 2021-01-25 NOTE — PROGRESS NOTES
Pt attended [facility] recovery group  Pt calm and cooperative  Pt making progress on MH recovery goals  01/25/21 1100   Activity/Group Checklist   Group Other (Comment)  (MH recovery)   Attendance Attended   Attendance Duration (min) 31-45   Interactions Interacted appropriately   Affect/Mood Calm   Goals Achieved Identified feelings; Discussed coping strategies; Able to listen to others; Able to engage in interactions; Able to reflect/comment on own behavior

## 2021-01-26 PROCEDURE — 99232 SBSQ HOSP IP/OBS MODERATE 35: CPT | Performed by: NURSE PRACTITIONER

## 2021-01-26 RX ORDER — GUAIFENESIN/DEXTROMETHORPHAN 100-10MG/5
5 SYRUP ORAL EVERY 6 HOURS PRN
Status: DISCONTINUED | OUTPATIENT
Start: 2021-01-26 | End: 2021-02-01 | Stop reason: HOSPADM

## 2021-01-26 RX ADMIN — GUAIFENESIN AND DEXTROMETHORPHAN 5 ML: 100; 10 SYRUP ORAL at 10:43

## 2021-01-26 RX ADMIN — THIAMINE HCL TAB 100 MG 100 MG: 100 TAB at 08:04

## 2021-01-26 RX ADMIN — APIXABAN 2.5 MG: 2.5 TABLET, FILM COATED ORAL at 16:55

## 2021-01-26 RX ADMIN — Medication 2000 UNITS: at 08:04

## 2021-01-26 RX ADMIN — PANTOPRAZOLE SODIUM 20 MG: 20 TABLET, DELAYED RELEASE ORAL at 06:17

## 2021-01-26 RX ADMIN — DICYCLOMINE HYDROCHLORIDE 20 MG: 20 TABLET ORAL at 06:17

## 2021-01-26 RX ADMIN — FOLIC ACID 1 MG: 1 TABLET ORAL at 08:04

## 2021-01-26 RX ADMIN — DICYCLOMINE HYDROCHLORIDE 20 MG: 20 TABLET ORAL at 16:55

## 2021-01-26 RX ADMIN — MIRTAZAPINE 45 MG: 30 TABLET, FILM COATED ORAL at 21:14

## 2021-01-26 RX ADMIN — APIXABAN 2.5 MG: 2.5 TABLET, FILM COATED ORAL at 08:04

## 2021-01-26 RX ADMIN — DICYCLOMINE HYDROCHLORIDE 20 MG: 20 TABLET ORAL at 21:14

## 2021-01-26 RX ADMIN — DONEPEZIL HYDROCHLORIDE 10 MG: 10 TABLET, FILM COATED ORAL at 21:14

## 2021-01-26 RX ADMIN — Medication 1 TABLET: at 08:04

## 2021-01-26 RX ADMIN — GUAIFENESIN AND DEXTROMETHORPHAN 5 ML: 100; 10 SYRUP ORAL at 21:13

## 2021-01-26 RX ADMIN — DICYCLOMINE HYDROCHLORIDE 20 MG: 20 TABLET ORAL at 10:43

## 2021-01-26 NOTE — NURSING NOTE
Patient reported her cough got better  She requested to have one before bed  Will continue to monitor

## 2021-01-26 NOTE — NURSING NOTE
Patient visible on the unit, social with staff and peers when approached  Patient rates her depression a 2/10 and anxiety a 1/4, denies SI/HI AH/VH  Patient pleasantly confused, cooperative with assessment questions  Patient medication and meal compliant, appetite good  VSS, no pain reported  Will continue to monitor frequently

## 2021-01-26 NOTE — PROGRESS NOTES
Progress Note - Behavioral Health   Christos Jerome 78 y o  female MRN: [id number]  Unit/Bed#: Anaya Lechuga 671-17 Encounter: [id number]    The patient was seen for continuing care and reviewed with treatment team   Staff reports the patient has been visible and social on the unit  She has been attending groups and interacting appropriately  Placement pending  She is not feeling particularly depressed or anxious today  Her only complaint currently is a dry cough and cough drops requested  Sleep and appetite have been adequate  Denies side effects to medications  Denies SI      Severe recurrent major depression without psychotic features ([facility] [address] )    Vital signs in last 24 hours:  Temp:  [97 1 °F (36 2 °C)-98 7 °F (37 1 °C)] 97 1 °F (36 2 °C)  HR:  [56-95] 56  Resp:  [16-20] 16  BP: (139-156)/(68-82) 139/68    Mental Status Evaluation:    Appearance Adequate hygiene and grooming   Behavior cooperative and calm   Mood euthymic   Speech Sparse   Affect appropriate   Thought Processes Goal directed and coherent   Thought Content Does not verbalize delusional material   Perceptual Disturbances Denies hallucinations and does not appear to be responding to internal stimuli   Risk Potential Suicidal/Homicidal Ideation - No evidence of suicidal or homicidal ideation and Patient does not verbalize suicidal or homicidal ideation  Risk of Violence - No evidence of risk for violence found on assessment  Risk of Self Mutilation - No evidence of risk for self mutilation found on assessment   Sensorium oriented to person and place   Cognition/Memory short term memory impaired   Consciousness alert and awake   Attention/Concentration attention span and concentration appear shorter than expected for age   Insight limited   Judgement limited   Muscle Strength and Gait/Station normal muscle strength and normal muscle tone, normal gait/station and normal balance   Motor Activity no abnormal movements       Progress Toward Goals:  Awaiting placement      Recommended Treatment: Continue with pharmacotherapy, group therapy, milieu therapy and occupational therapy    The patient will be maintained on the following medications:  Current Facility-Administered Medications   Medication Dose Route Frequency Provider Last Rate    acetaminophen  650 mg Oral Q6H PRN John Labor, MD      apixaban  2 5 mg Oral BID Filomena Barlow MD      bismuth subsalicylate  195 mg Oral Q6H PRN Liberty Jovel MD      cholecalciferol  2,000 Units Oral Daily John Labor, MD      dextromethorphan-guaiFENesin  5 mL Oral Q6H PRN EarMARC Arellano      dicyclomine  20 mg Oral 4x Daily (AC & HS) Liberty Jovel MD      donepezil  10 mg Oral HS Garrett Labor, MD      folic acid  1 mg Oral Daily Filomena Barlow MD      haloperidol  0 5 mg Oral Q6H PRN John Labor, MD      haloperidol  1 mg Oral Q8H PRN Garrett Labor, MD      haloperidol lactate  2 mg Intramuscular Q6H PRN John Labor, MD      hydrOXYzine HCL  25 mg Oral Q6H PRN John Labor, MD      loperamide  2 mg Oral 4x Daily PRN Maria C Denney MD      LORazepam  1 mg Intramuscular Q6H PRN Garrett Labor, MD      LORazepam  1 mg Oral Q6H PRN Garrett Labor, MD      mirtazapine  45 mg Oral HS Dioni Jung MD      multivitamin-minerals  1 tablet Oral Daily John Labor, MD      nicotine polacrilex  4 mg Oral Q2H PRN Garrett Labor, MD      pantoprazole  20 mg Oral Early Morning Garrett Labor, MD      thiamine  100 mg Oral Daily Filomena Barlow MD      traZODone  50 mg Oral HS PRN Garrett Labor, MD         Severe recurrent major depression without psychotic features ([facility] [address] )

## 2021-01-26 NOTE — PLAN OF CARE
Problem: Depression  Goal: Treatment Goal: Demonstrate behavioral control of depressive symptoms, verbalize feelings of improved mood/affect, and adopt new coping skills prior to discharge  Outcome: Progressing  Goal: Verbalize thoughts and feelings  Description: Interventions:  - Assess and re-assess patient's level of risk   - Engage patient in 1:1 interactions, daily, for a minimum of 15 minutes   - Encourage patient to express feelings, fears, frustrations, hopes   Outcome: Progressing  Goal: Refrain from harming self  Description: Interventions:  - Monitor patient closely, per order   - Supervise medication ingestion, monitor effects and side effects   Outcome: Progressing  Goal: Refrain from isolation  Description: Interventions:  - Develop a trusting relationship   - Encourage socialization   Outcome: Progressing  Goal: Refrain from self-neglect  Outcome: Progressing  Goal: Attend and participate in unit activities, including therapeutic, recreational, and educational groups  Description: Interventions:  - Provide therapeutic and educational activities daily, encourage attendance and participation, and document same in the medical record   Outcome: Progressing  Goal: Complete daily ADLs, including personal hygiene independently, as able  Description: Interventions:  - Observe, teach, and assist patient with ADLS  -  Monitor and promote a balance of rest/activity, with adequate nutrition and elimination   Outcome: Progressing     Problem: Risk for Self Injury/Neglect  Goal: Treatment Goal: Remain safe during length of stay, learn and adopt new coping skills, and be free of self-injurious ideation, impulses and acts at the time of discharge  Outcome: Progressing     Problem: Anxiety  Goal: Anxiety is at manageable level  Description: Interventions:  - Assess and monitor patient's anxiety level     - Monitor for signs and symptoms (heart palpitations, chest pain, shortness of breath, headaches, nausea, feeling jumpy, restlessness, irritable, apprehensive)  - Collaborate with interdisciplinary team and initiate plan and interventions as ordered    - [place] patient to unit/surroundings  - Explain treatment plan  - Encourage participation in care  - Encourage verbalization of concerns/fears  - Identify coping mechanisms  - Assist in developing anxiety-reducing skills  - Administer/offer alternative therapies  - Limit or eliminate stimulants  Outcome: Progressing

## 2021-01-26 NOTE — PROGRESS NOTES
01/26/21 0935   Team Meeting   Meeting Type Daily Rounds   Team Members Present   Team Members Present Physician;Nurse;;   Physician Team Member [facility]   Nursing Team Member [id number] [facility] Management Team Member [facility]   Social Work Team Member EUSEBIO   Patient/Family Present   Patient Present No   Patient's Family Present No    Mild depression and anxiety  Waiting placement

## 2021-01-26 NOTE — NURSING NOTE
Patient visible on the unit  Denies depression and anxiety/SI/VH/AH  Pleasantly confused   Denies pain and discomfort  Dry cough noted   Chest clear   Patient had one time cough drop  Will continue to monitor

## 2021-01-27 PROBLEM — R05.8 NON-PRODUCTIVE COUGH: Status: ACTIVE | Noted: 2021-01-27

## 2021-01-27 PROBLEM — G89.29 CHRONIC LOW BACK PAIN: Status: ACTIVE | Noted: 2021-01-27

## 2021-01-27 PROBLEM — M54.50 CHRONIC LOW BACK PAIN: Status: ACTIVE | Noted: 2021-01-27

## 2021-01-27 PROCEDURE — 99232 SBSQ HOSP IP/OBS MODERATE 35: CPT | Performed by: NURSE PRACTITIONER

## 2021-01-27 PROCEDURE — 99232 SBSQ HOSP IP/OBS MODERATE 35: CPT | Performed by: PHYSICIAN ASSISTANT

## 2021-01-27 RX ADMIN — APIXABAN 2.5 MG: 2.5 TABLET, FILM COATED ORAL at 17:22

## 2021-01-27 RX ADMIN — FOLIC ACID 1 MG: 1 TABLET ORAL at 08:23

## 2021-01-27 RX ADMIN — DICYCLOMINE HYDROCHLORIDE 20 MG: 20 TABLET ORAL at 11:30

## 2021-01-27 RX ADMIN — MIRTAZAPINE 45 MG: 30 TABLET, FILM COATED ORAL at 21:07

## 2021-01-27 RX ADMIN — Medication 1 TABLET: at 08:23

## 2021-01-27 RX ADMIN — DICYCLOMINE HYDROCHLORIDE 20 MG: 20 TABLET ORAL at 15:16

## 2021-01-27 RX ADMIN — PANTOPRAZOLE SODIUM 20 MG: 20 TABLET, DELAYED RELEASE ORAL at 06:24

## 2021-01-27 RX ADMIN — GUAIFENESIN AND DEXTROMETHORPHAN 5 ML: 100; 10 SYRUP ORAL at 10:22

## 2021-01-27 RX ADMIN — DICYCLOMINE HYDROCHLORIDE 20 MG: 20 TABLET ORAL at 06:24

## 2021-01-27 RX ADMIN — Medication 2000 UNITS: at 08:23

## 2021-01-27 RX ADMIN — APIXABAN 2.5 MG: 2.5 TABLET, FILM COATED ORAL at 08:23

## 2021-01-27 RX ADMIN — DICYCLOMINE HYDROCHLORIDE 20 MG: 20 TABLET ORAL at 21:07

## 2021-01-27 RX ADMIN — GUAIFENESIN AND DEXTROMETHORPHAN 5 ML: 100; 10 SYRUP ORAL at 21:10

## 2021-01-27 RX ADMIN — DONEPEZIL HYDROCHLORIDE 10 MG: 10 TABLET, FILM COATED ORAL at 21:06

## 2021-01-27 RX ADMIN — THIAMINE HCL TAB 100 MG 100 MG: 100 TAB at 08:23

## 2021-01-27 NOTE — ASSESSMENT & PLAN NOTE
· Patient with chronic lumbar spine pain  · Xray on 05/30/20 significant for compression deformities of T12, L1, and L2, likely chronic and osteoporotic in etiology  · DEXA scan on 08/30/20 significant for Lumbar spine T score -2 6, Left Hip T score -2 2, and Left femoral neck -3 0  · Continue with tylenol prn

## 2021-01-27 NOTE — PROGRESS NOTES
Progress Note - Lavern Clark Stump 1940, 78 y o  female MRN: [id number]    Unit/Bed#: Sydni Fuchs 026-45 Encounter: [id number]    Primary Care Provider: Cosmo London DO   Date and time admitted to hospital: 2020  5:54 PM    Chronic low back pain  Assessment & Plan  · Patient with chronic lumbar spine pain  · Xray on 20 significant for compression deformities of T12, L1, and L2, likely chronic and osteoporotic in etiology  · DEXA scan on 20 significant for Lumbar spine T score -2 6, Left Hip T score -2 2, and Left femoral neck -3 0  · Continue with tylenol prn     Non-productive cough  Assessment & Plan  · Patient with occasional non productive cough, likely due to dryness  · Encourage fluids, cough drops prn    Nurse Coordination of Care Discussion: Yes     Discussions with Specialists or Other Care Team Provider: Yes    Education and Discussions with Family / Patient: Yes    Time Spent for Care: 20 minutes  More than 50% of total time spent on counseling and coordination of care as described above  Current Length of Stay: 39 day(s)    Code Status: Level 1 - Full Code      Subjective:   Contacted by nursing, patient with "dry cough" for 3 days    Patient complaining of occasional dry cough, feels like "tickle" in throat  Denies shortness of breath, orthopnea, dyspnea on exertion, congestion, post nasal drip, acid reflux symptoms, chills/sweats, body aches, or loss of taste and smell  States she is not drinking enough fluids because her water gets warm too quickly and she does not like warm water  Patient complaining of low back pain  Worsened by ambulation and standing after lying/sitting for a extended period of time  Denies any radiation to the legs, bowel or bladder incontinence, or saddle anesthesia  States pain rates 5/10 when exacerbated      Objective:     Vitals:   Temp (24hrs), Av 4 °F (36 3 °C), Min:97 4 °F (36 3 °C), Max:97 4 °F (36 3 °C)    Temp:  [97 4 °F (36 3 °C)] 97 4 °F (36 3 °C)  HR:  [88-96] 93  Resp:  [16] 16  BP: (127-141)/(65-74) 141/74  SpO2:  [95 %-97 %] 97 %  Body mass index is 19 69 kg/m²  Physical Exam:     Physical Exam      Additional Data:     Labs:                            * I Have Reviewed All Lab Data Listed Above    * Additional Pertinent Lab Tests Reviewed: No New Labs Available For Today    Imaging:    Imaging Reports Reviewed Today Include: No new imaging for today      Last 24 Hours Medication List:   Current Facility-Administered Medications   Medication Dose Route Frequency Provider Last Rate    acetaminophen  650 mg Oral Q6H PRN [place], MD      apixaban  2 5 mg Oral BID Chavo Holding, MD      bismuth subsalicylate  194 mg Oral Q6H PRN Daphnie Tovar MD      cholecalciferol  2,000 Units Oral Daily [place] Pod, MD      dextromethorphan-guaiFENesin  5 mL Oral Q6H PRN MARC Chery      dicyclomine  20 mg Oral 4x Daily (AC & HS) Daphnie Tovar MD      donepezil  10 mg Oral HS [place] Pod, MD      folic acid  1 mg Oral Daily Chavo Holding, MD      haloperidol  0 5 mg Oral Q6H PRN [place] Pod, MD      haloperidol  1 mg Oral Q8H PRN [place], MD      haloperidol lactate  2 mg Intramuscular Q6H PRN [place] Pod, MD      hydrOXYzine HCL  25 mg Oral Q6H PRN [place] Pod, MD      loperamide  2 mg Oral 4x Daily PRN Rodolfo Garcia MD      LORazepam  1 mg Intramuscular Q6H PRN [place], MD      LORazepam  1 mg Oral Q6H PRN [place] Pod, MD      mirtazapine  45 mg Oral HS Enid Quintanilla MD      multivitamin-minerals  1 tablet Oral Daily [place], MD      nicotine polacrilex  4 mg Oral Q2H PRN [place], MD      pantoprazole  20 mg Oral Early Morning [place] Pod, MD      thiamine  100 mg Oral Daily Chavo Holding, MD      traZODone  50 mg Oral HS PRN [place], MD          Today, Patient Was Seen By: Alberta Garcia PA-C      ** Please Note: Dictation voice to text software may have been used in the creation of this document   **

## 2021-01-27 NOTE — ASSESSMENT & PLAN NOTE
· Patient with occasional non productive cough, likely due to dryness  · Encourage fluids, cough drops prn

## 2021-01-27 NOTE — NURSING NOTE
Patient was visible on the unit  She denies any needs  Cooperative with medications  Patient is in bed  Monitored on q 7 minute checks

## 2021-01-27 NOTE — PROGRESS NOTES
Progress Note - Behavioral Health   Earney Osgood Stump 78 y o  female MRN: [id number]  Unit/Bed#: [facility] [place] 390-77 Encounter: [id number]    The patient was seen for continuing care and reviewed with treatment team   Staff notes the patient has been calm, cooperative and pleasant  She has been visible and social as well as attending groups  Placement is pending  She denies depression or anxiety today but is feeling somewhat frustrated with feeling stuck here and not knowing where she is going after discharge yet  Sleep and appetite are normal   She denies side effects to medications  Denies SI      Severe recurrent major depression without psychotic features ([facility] [address] )    Vital signs in last 24 hours:  Temp:  [97 4 °F (36 3 °C)] 97 4 °F (36 3 °C)  HR:  [88-96] 93  Resp:  [16] 16  BP: (127-141)/(65-74) 141/74    Mental Status Evaluation:    Appearance Adequate hygiene and grooming   Behavior calm and cooperative   Mood euthymic   Speech Sparse   Affect appropriate   Thought Processes Goal directed and coherent   Thought Content Does not verbalize delusional material   Perceptual Disturbances Denies hallucinations and does not appear to be responding to internal stimuli   Risk Potential Suicidal/Homicidal Ideation - No evidence of suicidal or homicidal ideation and Patient does not verbalize suicidal or homicidal ideation  Risk of Violence - No evidence of risk for violence found on assessment  Risk of Self Mutilation - No evidence of risk for self mutilation found on assessment   Sensorium oriented to person and place   Cognition/Memory short term memory impaired   Consciousness alert and awake   Attention/Concentration attention span and concentration appear shorter than expected for age   Insight limited   Judgement limited   Muscle Strength and Gait/Station normal muscle strength and normal muscle tone, normal gait/station and normal balance   Motor Activity no abnormal movements       Progress Toward Goals:  Awaiting placement      Recommended Treatment: Continue with pharmacotherapy, group therapy, milieu therapy and occupational therapy    The patient will be maintained on the following medications:  Current Facility-Administered Medications   Medication Dose Route Frequency Provider Last Rate    acetaminophen  650 mg Oral Q6H PRN Filomena Daniel MD      apixaban  2 5 mg Oral BID Zeferino Garcia MD      bismuth subsalicylate  568 mg Oral Q6H PRN Rony Selby MD      cholecalciferol  2,000 Units Oral Daily Filomena Daniel MD      dextromethorphan-guaiFENesin  5 mL Oral Q6H PRN MARC Greenberg      dicyclomine  20 mg Oral 4x Daily (AC & HS) Rony Selby MD      donepezil  10 mg Oral HS Filomena Daniel MD      folic acid  1 mg Oral Daily Zeferino Garcia MD      haloperidol  0 5 mg Oral Q6H PRN Filomena Daniel MD      haloperidol  1 mg Oral Q8H PRN Filomena Daniel MD      haloperidol lactate  2 mg Intramuscular Q6H PRN Filomena Daniel MD      hydrOXYzine HCL  25 mg Oral Q6H PRN Filomena Daniel MD      loperamide  2 mg Oral 4x Daily PRN Dom Pedroza MD      LORazepam  1 mg Intramuscular Q6H PRN Filomena Daniel MD      LORazepam  1 mg Oral Q6H PRN Filomena Daniel MD      mirtazapine  45 mg Oral HS Susana Jacobs MD      multivitamin-minerals  1 tablet Oral Daily Filomena Daniel MD      nicotine polacrilex  4 mg Oral Q2H PRN Filomena Daniel MD      pantoprazole  20 mg Oral Early Morning Filomena Daniel MD      thiamine  100 mg Oral Daily Zeferino Garcia MD      traZODone  50 mg Oral HS PRN Filomena Daniel MD         Severe recurrent major depression without psychotic features ([facility] [address] )

## 2021-01-27 NOTE — NURSING NOTE
Patient visible on the unit  Denies depression and anxiety  Cooperative   Noted dry cough   Robitussin PRN offered   Lungs clear   Med and meal  Compliant  Will continue to monitor

## 2021-01-27 NOTE — TREATMENT TEAM
01/27/21 1200   Service   Service SLIM   Provider Name Kris Alves   Multi-disciplinary Rounds   Diagnosis  Reviewed   Code Status Reviewed   Vital Signs Reviewed   Dry cough   No new order received   contineu with cough drops and Robitussin

## 2021-01-27 NOTE — PROGRESS NOTES
01/27/21 0935   Team Meeting   Meeting Type Daily Rounds   Team Members Present   Team Members Present Physician;Nurse;;   Physician Team Member [facility]   Nursing Team Member [id number] Management Team Member [id number]   Social Work Team Member [facility]SE[facility]   Patient/Family Present   Patient Present No   Patient's Family Present No     Patient is pleasant, cooperative, attends groups  Waiting placement

## 2021-01-27 NOTE — PLAN OF CARE
Problem: Risk for Self Injury/Neglect  Goal: Treatment Goal: Remain safe during length of stay, learn and adopt new coping skills, and be free of self-injurious ideation, impulses and acts at the time of discharge  Outcome: Progressing     Problem: Depression  Goal: Treatment Goal: Demonstrate behavioral control of depressive symptoms, verbalize feelings of improved mood/affect, and adopt new coping skills prior to discharge  Outcome: Progressing  Goal: Verbalize thoughts and feelings  Description: Interventions:  - Assess and re-assess patient's level of risk   - Engage patient in 1:1 interactions, daily, for a minimum of 15 minutes   - Encourage patient to express feelings, fears, frustrations, hopes   Outcome: Progressing  Goal: Refrain from harming self  Description: Interventions:  - Monitor patient closely, per order   - Supervise medication ingestion, monitor effects and side effects   Outcome: Progressing  Goal: Refrain from isolation  Description: Interventions:  - Develop a trusting relationship   - Encourage socialization   Outcome: Progressing  Goal: Refrain from self-neglect  Outcome: Progressing  Goal: Attend and participate in unit activities, including therapeutic, recreational, and educational groups  Description: Interventions:  - Provide therapeutic and educational activities daily, encourage attendance and participation, and document same in the medical record   Outcome: Progressing  Goal: Complete daily ADLs, including personal hygiene independently, as able  Description: Interventions:  - Observe, teach, and assist patient with ADLS  -  Monitor and promote a balance of rest/activity, with adequate nutrition and elimination   Outcome: Progressing     Problem: Anxiety  Goal: Anxiety is at manageable level  Description: Interventions:  - Assess and monitor patient's anxiety level     - Monitor for signs and symptoms (heart palpitations, chest pain, shortness of breath, headaches, nausea, feeling jumpy, restlessness, irritable, apprehensive)  - Collaborate with interdisciplinary team and initiate plan and interventions as ordered    - [place] patient to unit/surroundings  - Explain treatment plan  - Encourage participation in care  - Encourage verbalization of concerns/fears  - Identify coping mechanisms  - Assist in developing anxiety-reducing skills  - Administer/offer alternative therapies  - Limit or eliminate stimulants  Outcome: Progressing [FreeTextEntry1] : covid 19 pcr test taken. will give results when available.

## 2021-01-28 PROCEDURE — 99231 SBSQ HOSP IP/OBS SF/LOW 25: CPT | Performed by: NURSE PRACTITIONER

## 2021-01-28 RX ADMIN — FOLIC ACID 1 MG: 1 TABLET ORAL at 08:04

## 2021-01-28 RX ADMIN — THIAMINE HCL TAB 100 MG 100 MG: 100 TAB at 08:05

## 2021-01-28 RX ADMIN — DICYCLOMINE HYDROCHLORIDE 20 MG: 20 TABLET ORAL at 12:01

## 2021-01-28 RX ADMIN — Medication 1 TABLET: at 08:04

## 2021-01-28 RX ADMIN — DICYCLOMINE HYDROCHLORIDE 20 MG: 20 TABLET ORAL at 06:18

## 2021-01-28 RX ADMIN — Medication 2000 UNITS: at 08:04

## 2021-01-28 RX ADMIN — DONEPEZIL HYDROCHLORIDE 10 MG: 10 TABLET, FILM COATED ORAL at 21:15

## 2021-01-28 RX ADMIN — PANTOPRAZOLE SODIUM 20 MG: 20 TABLET, DELAYED RELEASE ORAL at 06:18

## 2021-01-28 RX ADMIN — APIXABAN 2.5 MG: 2.5 TABLET, FILM COATED ORAL at 08:05

## 2021-01-28 RX ADMIN — DICYCLOMINE HYDROCHLORIDE 20 MG: 20 TABLET ORAL at 16:53

## 2021-01-28 RX ADMIN — MIRTAZAPINE 45 MG: 30 TABLET, FILM COATED ORAL at 21:14

## 2021-01-28 RX ADMIN — DICYCLOMINE HYDROCHLORIDE 20 MG: 20 TABLET ORAL at 21:15

## 2021-01-28 RX ADMIN — APIXABAN 2.5 MG: 2.5 TABLET, FILM COATED ORAL at 17:01

## 2021-01-28 NOTE — PROGRESS NOTES
Progress Note - Behavioral Health   Christos Jerome 78 y o  female MRN: [id number]  Unit/Bed#: [facility] 008-00 Encounter: [id number]    The patient was seen for continuing care and reviewed with treatment team   Staff reports the patient has been calm, cooperative, visible and social   Placement is pending  She is cooperative on approach and says her mood is okay  Her major complaint right now is boredom and not knowing for sure where she is going after discharge  She denies depression and anxiety currently  No issues reported with sleep or appetite  Denies medication side effects  Denies SI      Severe recurrent major depression without psychotic features ([facility] [address] )    Vital signs in last 24 hours:  Temp:  [98 1 °F (36 7 °C)-99 2 °F (37 3 °C)] 98 1 °F (36 7 °C)  HR:  [] 76  Resp:  [16-18] 16  BP: (110-134)/(65-83) 133/83    Mental Status Evaluation:    Appearance Adequate hygiene and grooming   Behavior cooperative   Mood euthymic   Speech Sparse   Affect constricted   Thought Processes Goal directed and coherent   Thought Content Does not verbalize delusional material   Perceptual Disturbances Denies hallucinations and does not appear to be responding to internal stimuli   Risk Potential Suicidal/Homicidal Ideation - No evidence of suicidal or homicidal ideation and Patient does not verbalize suicidal or homicidal ideation  Risk of Violence - No evidence of risk for violence found on assessment  Risk of Self Mutilation - No evidence of risk for self mutilation found on assessment   Sensorium oriented to person and place   Cognition/Memory short term memory impaired   Consciousness alert and awake   Attention/Concentration attention span and concentration appear shorter than expected for age   Insight limited   Judgement limited   Muscle Strength and Gait/Station normal muscle strength and normal muscle tone, normal gait/station and normal balance   Motor Activity no abnormal movements       Progress Toward Goals:  Placement pending      Recommended Treatment: Continue with pharmacotherapy, group therapy, milieu therapy and occupational therapy    The patient will be maintained on the following medications:  Current Facility-Administered Medications   Medication Dose Route Frequency Provider Last Rate    acetaminophen  650 mg Oral Q6H PRN Niki Ramesh MD      apixaban  2 5 mg Oral BID Mary Cabezas MD      bismuth subsalicylate  544 mg Oral Q6H PRN Gerson Molina MD      cholecalciferol  2,000 Units Oral Daily Niki Ramesh MD      dextromethorphan-guaiFENesin  5 mL Oral Q6H PRN MARC Major      dicyclomine  20 mg Oral 4x Daily (AC & HS) Gerson Molina MD      donepezil  10 mg Oral HS Niki Ramesh MD      folic acid  1 mg Oral Daily Mary Cabezas MD      haloperidol  0 5 mg Oral Q6H PRN Niki Ramesh MD      haloperidol  1 mg Oral Q8H PRN Niki Ramesh MD      haloperidol lactate  2 mg Intramuscular Q6H PRN Niki Ramesh MD      hydrOXYzine HCL  25 mg Oral Q6H PRN Niki Ramesh MD      loperamide  2 mg Oral 4x Daily PRN Fer Mccauley MD      LORazepam  1 mg Intramuscular Q6H PRN Niki Ramesh MD      LORazepam  1 mg Oral Q6H PRN Niki Ramesh MD      mirtazapine  45 mg Oral HS Brett Kam MD      multivitamin-minerals  1 tablet Oral Daily Niki Ramesh MD      nicotine polacrilex  4 mg Oral Q2H PRN Niki Ramesh MD      pantoprazole  20 mg Oral Early Morning Niki Ramesh MD      thiamine  100 mg Oral Daily Mary Cabezas MD      traZODone  50 mg Oral HS PRN Niki Ramesh MD         Severe recurrent major depression without psychotic features ([facility] [address] )

## 2021-01-28 NOTE — CASE MANAGEMENT
This writer checked on status of referral for LTAC at [place], [address] [place] [address] and [facility]  All facilities declined patient due to bed availability  This writer spoke with patient's daughter Anh Felder and informed her beds are not available at referred facilities  This writer informed Anh Felder she should contact [facility]  to complete referral  nAh Felder reports her mother can be discharged to her care while she waits for the bed [facility]   This writer discussed documents [facility]  will need from the hospital  Anh Felder reports they requested COVID test and also DME  This writer suggested Anh Felder speak with their admissions department and provide this writer's contact information to coordinate services  This writer informed Jer Franks she will be discharged on Monday  Jer Franks is in agreement with this plan  This writer sent a message to [facility] and updated with patient's pending discharge 2/1

## 2021-01-28 NOTE — NURSING NOTE
Patient is pleasant and cooperative with the care and medications  Patient brightens on approach  She is visible on the unit, walking in hallway  When approached with conversation she answers with one or two words  Denies depression and anxiety  Denies SI,HI

## 2021-01-28 NOTE — PROGRESS NOTES
Pt attended 2/3 groups  Pt visible, cooperative and able to self express  01/28/21 1330   Activity/Group Checklist   Group Other (Comment)  (positive reflection)   Attendance Attended   Attendance Duration (min) 31-45   Interactions Interacted appropriately   Affect/Mood Calm   Goals Achieved Identified feelings; Discussed coping strategies; Discussed self-esteem issues; Able to listen to others; Able to engage in interactions

## 2021-01-28 NOTE — PROGRESS NOTES
Met with pt 1:1 for therepuetic intervention and monitoring  Pt did not attend group in am   Pt stated she wanted to read the paper  Pt was more isolative, brief discussion and fair eye contact and focused on being in her room reading paper  Pt able to make her needs known  Encouarged pt to be visible on unit and attend group in afternoon  01/28/21 1030   Activity/Group Checklist   Group Other (Comment)  (motivational interviewing)   Attendance Attended   Attendance Duration (min) 0-15   Interactions Interacted appropriately   Affect/Mood Appropriate   Goals Achieved Identified feelings; Discussed coping strategies; Able to listen to others; Able to engage in interactions; Able to self-disclose

## 2021-01-29 PROCEDURE — 99232 SBSQ HOSP IP/OBS MODERATE 35: CPT | Performed by: NURSE PRACTITIONER

## 2021-01-29 RX ORDER — DICYCLOMINE HCL 20 MG
20 TABLET ORAL
Qty: 120 TABLET | Refills: 0 | Status: SHIPPED | OUTPATIENT
Start: 2021-01-29

## 2021-01-29 RX ORDER — DONEPEZIL HYDROCHLORIDE 10 MG/1
10 TABLET, FILM COATED ORAL
Qty: 30 TABLET | Refills: 0 | Status: SHIPPED | OUTPATIENT
Start: 2021-01-29

## 2021-01-29 RX ORDER — MELATONIN
2000 DAILY
Qty: 60 TABLET | Refills: 0 | Status: SHIPPED | OUTPATIENT
Start: 2021-01-30

## 2021-01-29 RX ORDER — LANOLIN ALCOHOL/MO/W.PET/CERES
100 CREAM (GRAM) TOPICAL DAILY
Qty: 30 TABLET | Refills: 0 | Status: SHIPPED | OUTPATIENT
Start: 2021-01-30

## 2021-01-29 RX ORDER — MIRTAZAPINE 45 MG/1
45 TABLET, FILM COATED ORAL
Qty: 30 TABLET | Refills: 0 | Status: SHIPPED | OUTPATIENT
Start: 2021-01-29

## 2021-01-29 RX ORDER — FOLIC ACID 1 MG/1
1 TABLET ORAL DAILY
Qty: 30 TABLET | Refills: 0 | Status: SHIPPED | OUTPATIENT
Start: 2021-01-30

## 2021-01-29 RX ADMIN — DICYCLOMINE HYDROCHLORIDE 20 MG: 20 TABLET ORAL at 16:15

## 2021-01-29 RX ADMIN — PANTOPRAZOLE SODIUM 20 MG: 20 TABLET, DELAYED RELEASE ORAL at 06:10

## 2021-01-29 RX ADMIN — DICYCLOMINE HYDROCHLORIDE 20 MG: 20 TABLET ORAL at 11:57

## 2021-01-29 RX ADMIN — Medication 2000 UNITS: at 08:34

## 2021-01-29 RX ADMIN — DICYCLOMINE HYDROCHLORIDE 20 MG: 20 TABLET ORAL at 21:31

## 2021-01-29 RX ADMIN — THIAMINE HCL TAB 100 MG 100 MG: 100 TAB at 08:34

## 2021-01-29 RX ADMIN — DONEPEZIL HYDROCHLORIDE 10 MG: 10 TABLET, FILM COATED ORAL at 21:31

## 2021-01-29 RX ADMIN — APIXABAN 2.5 MG: 2.5 TABLET, FILM COATED ORAL at 08:34

## 2021-01-29 RX ADMIN — DICYCLOMINE HYDROCHLORIDE 20 MG: 20 TABLET ORAL at 06:10

## 2021-01-29 RX ADMIN — MIRTAZAPINE 45 MG: 30 TABLET, FILM COATED ORAL at 21:31

## 2021-01-29 RX ADMIN — FOLIC ACID 1 MG: 1 TABLET ORAL at 08:34

## 2021-01-29 RX ADMIN — Medication 1 TABLET: at 08:34

## 2021-01-29 RX ADMIN — APIXABAN 2.5 MG: 2.5 TABLET, FILM COATED ORAL at 17:16

## 2021-01-29 NOTE — PROGRESS NOTES
Pt attended group  Pt was scant in self expression and stayed for duration  01/29/21 1100   Activity/Group Checklist   Group Other (Comment)  (MH recovery)   Attendance Attended   Attendance Duration (min) 31-45   Interactions Other (Comment)  (scant)   Affect/Mood Calm   Goals Achieved Identified feelings; Discussed coping strategies; Able to listen to others; Able to engage in interactions; Able to reflect/comment on own behavior

## 2021-01-29 NOTE — PLAN OF CARE
Problem: Risk for Self Injury/Neglect  Goal: Treatment Goal: Remain safe during length of stay, learn and adopt new coping skills, and be free of self-injurious ideation, impulses and acts at the time of discharge  Outcome: Progressing     Problem: Depression  Goal: Treatment Goal: Demonstrate behavioral control of depressive symptoms, verbalize feelings of improved mood/affect, and adopt new coping skills prior to discharge  Outcome: Progressing  Goal: Verbalize thoughts and feelings  Description: Interventions:  - Assess and re-assess patient's level of risk   - Engage patient in 1:1 interactions, daily, for a minimum of 15 minutes   - Encourage patient to express feelings, fears, frustrations, hopes   Outcome: Progressing  Goal: Refrain from harming self  Description: Interventions:  - Monitor patient closely, per order   - Supervise medication ingestion, monitor effects and side effects   Outcome: Progressing  Goal: Refrain from isolation  Description: Interventions:  - Develop a trusting relationship   - Encourage socialization   Outcome: Progressing  Goal: Refrain from self-neglect  Outcome: Progressing  Goal: Attend and participate in unit activities, including therapeutic, recreational, and educational groups  Description: Interventions:  - Provide therapeutic and educational activities daily, encourage attendance and participation, and document same in the medical record   Outcome: Progressing  Goal: Complete daily ADLs, including personal hygiene independently, as able  Description: Interventions:  - Observe, teach, and assist patient with ADLS  -  Monitor and promote a balance of rest/activity, with adequate nutrition and elimination   Outcome: Progressing     Problem: Anxiety  Goal: Anxiety is at manageable level  Description: Interventions:  - Assess and monitor patient's anxiety level     - Monitor for signs and symptoms (heart palpitations, chest pain, shortness of breath, headaches, nausea, feeling jumpy, restlessness, irritable, apprehensive)  - Collaborate with interdisciplinary team and initiate plan and interventions as ordered    - [place] patient to unit/surroundings  - Explain treatment plan  - Encourage participation in care  - Encourage verbalization of concerns/fears  - Identify coping mechanisms  - Assist in developing anxiety-reducing skills  - Administer/offer alternative therapies  - Limit or eliminate stimulants  Outcome: Progressing     Problem: Ineffective Coping  Goal: Participates in unit activities  Description: Interventions:  - Provide therapeutic environment   - Provide required programming   - Redirect inappropriate behaviors   Outcome: Progressing     Problem: DISCHARGE PLANNING  Goal: Discharge to home or other facility with appropriate resources  Description: INTERVENTIONS:  - Identify barriers to discharge w/patient and caregiver  - Arrange for needed discharge resources and transportation as appropriate  - Identify discharge learning needs (meds, wound care, etc )  - Refer to Case Management Department for coordinating discharge planning if the patient needs post-hospital services based on physician/advanced practitioner order or complex needs related to functional status, cognitive ability, or social support system  Outcome: Progressing

## 2021-01-29 NOTE — NURSING NOTE
Pt visible on unit, social with select peers  Med and meal compliant  Pt attending groups  Denies pain  Denies all signs and symptoms  Will continue to monitor

## 2021-01-29 NOTE — PROGRESS NOTES
01/29/21 0908   Team Meeting   Meeting Type Daily Rounds   Team Members Present   Team Members Present Physician;Nurse;;   Physician Team Member [facility] FOR [facility]   Nursing Team Member [id number] [facility] Management Team Member [facility]   Social Work Team Member Hiren Parham   Patient/Family Present   Patient Present No   Patient's Family Present No     Patient is pleasant and cooperative, she is taking her medications as prescribed  She will be discharged to her her daughter's on Monday

## 2021-01-29 NOTE — PLAN OF CARE
Pt  Attended 2 of 3 groups today yet displayed a stronger desire to be discharged from the hospital   Problem: Ineffective Coping  Goal: Participates in unit activities  Description: Interventions:  - Provide therapeutic environment   - Provide required programming   - Redirect inappropriate behaviors   Outcome: Progressing

## 2021-01-29 NOTE — CASE MANAGEMENT
This writer spoke with patient's daughter Mohammed Nageotte Mohammed Nageotte reports [facility]  will accept her mother at their facility on 1/3/21  Mohammed Nageotte reports her mother will stay with her Monday and Tuesday and then transfer to [facility]   Debby requested COVID Test and DME  She also requested H&P  With recent Progress Note faxed to [facility]  [phone])  This writer faxed requested documents to [facility]   This writer confirmed with patient her discharge on Monday   This writer spoke with patient's daughter to arrange transport for [facility] for Monday at  11:00am

## 2021-01-29 NOTE — PROGRESS NOTES
Progress Note - Behavioral Health   Danita Jerome 78 y o  female MRN: [id number]  Unit/Bed#: [address] 236-19 Encounter: [id number]    The patient was seen for continuing care and reviewed with treatment team   Staff notes the patient has been calm, pleasant and cooperative  She has been visible, social and attending groups  No issues reported with sleep or appetite  She will be discharged on Monday to her daughter's care until her facility is available  She denies any depression or anxiety  She reports her sleep is good and her appetite is okay  She denies side effects to medications  Denies SI  She is happy she will be discharged to her daughter but upset that she is only staying with her family for a few days        Severe recurrent major depression without psychotic features ([facility] [address] )    Vital signs in last 24 hours:  Temp:  [97 4 °F (36 3 °C)-99 5 °F (37 5 °C)] 97 4 °F (36 3 °C)  HR:  [88-95] 91  Resp:  [16-18] 18  BP: (126-150)/(72-82) 150/82    Mental Status Evaluation:    Appearance Adequate hygiene and grooming and Good eye contact   Behavior cooperative   Mood euthymic   Speech Sparse   Affect constricted   Thought Processes Goal directed and coherent   Thought Content Does not verbalize delusional material   Perceptual Disturbances Denies hallucinations and does not appear to be responding to internal stimuli   Risk Potential Suicidal/Homicidal Ideation - No evidence of suicidal or homicidal ideation and Patient does not verbalize suicidal or homicidal ideation  Risk of Violence - No evidence of risk for violence found on assessment  Risk of Self Mutilation - No evidence of risk for self mutilation found on assessment   Sensorium oriented to person and place   Cognition/Memory short term memory impaired   Consciousness alert and awake   Attention/Concentration attention span and concentration appear shorter than expected for age   Insight limited   Judgement limited   Muscle Strength and Gait/Station normal muscle strength and normal muscle tone, normal gait/station and normal balance   Motor Activity no abnormal movements       Progress Toward Goals:  Stable      Recommended Treatment: Continue with pharmacotherapy, group therapy, milieu therapy and occupational therapy    The patient will be maintained on the following medications:  Current Facility-Administered Medications   Medication Dose Route Frequency Provider Last Rate    acetaminophen  650 mg Oral Q6H PRN Jocelin Rodriguez MD      apixaban  2 5 mg Oral BID Ade Obregon MD      bismuth subsalicylate  403 mg Oral Q6H PRN Maame Glynn MD      cholecalciferol  2,000 Units Oral Daily Jocelin Rodriguez MD      dextromethorphan-guaiFENesin  5 mL Oral Q6H PRN MARC Carnes      dicyclomine  20 mg Oral 4x Daily (AC & HS) Maame Glynn MD      donepezil  10 mg Oral HS Jocelin Rodriguez MD      folic acid  1 mg Oral Daily Ade Obregon MD      haloperidol  0 5 mg Oral Q6H PRN Jocelin Rodriguez MD      haloperidol  1 mg Oral Q8H PRN Jocelin Rodriguez MD      haloperidol lactate  2 mg Intramuscular Q6H PRN Jocelin Rodriguez MD      hydrOXYzine HCL  25 mg Oral Q6H PRN Jocelin Rodriguez MD      loperamide  2 mg Oral 4x Daily PRN Carlos Brunson MD      LORazepam  1 mg Intramuscular Q6H PRN Jocelin Rodriguez MD      LORazepam  1 mg Oral Q6H PRN Jocelin Rodriguez MD      mirtazapine  45 mg Oral HS Erika Wilcox MD      multivitamin-minerals  1 tablet Oral Daily Jocelin Rodriguez MD      nicotine polacrilex  4 mg Oral Q2H PRN Jocelin Rodriguez MD      pantoprazole  20 mg Oral Early Morning Jocelin Rodriguez MD      thiamine  100 mg Oral Daily Ade Obregon MD      traZODone  50 mg Oral HS PRN Jocelin Rodriguez MD         Severe recurrent major depression without psychotic features ([facility] [address] )

## 2021-01-29 NOTE — NURSING NOTE
Patient was visible on the milieu tonight  She was seen watching TV in the dayroom with her peers  She was brief and had an irritable edge in conversation  She denied having any depression, anxiety, hallucinations or suicidal/homicidal thoughts  She was cooperative with taking her scheduled evening medications  Safety plan was reviewed with the patient and staff availability was reinforced

## 2021-01-30 PROCEDURE — 99232 SBSQ HOSP IP/OBS MODERATE 35: CPT | Performed by: STUDENT IN AN ORGANIZED HEALTH CARE EDUCATION/TRAINING PROGRAM

## 2021-01-30 RX ADMIN — DONEPEZIL HYDROCHLORIDE 10 MG: 10 TABLET, FILM COATED ORAL at 21:17

## 2021-01-30 RX ADMIN — DICYCLOMINE HYDROCHLORIDE 20 MG: 20 TABLET ORAL at 06:03

## 2021-01-30 RX ADMIN — Medication 1 TABLET: at 08:19

## 2021-01-30 RX ADMIN — APIXABAN 2.5 MG: 2.5 TABLET, FILM COATED ORAL at 17:00

## 2021-01-30 RX ADMIN — PANTOPRAZOLE SODIUM 20 MG: 20 TABLET, DELAYED RELEASE ORAL at 06:03

## 2021-01-30 RX ADMIN — DICYCLOMINE HYDROCHLORIDE 20 MG: 20 TABLET ORAL at 11:43

## 2021-01-30 RX ADMIN — APIXABAN 2.5 MG: 2.5 TABLET, FILM COATED ORAL at 08:19

## 2021-01-30 RX ADMIN — MIRTAZAPINE 45 MG: 30 TABLET, FILM COATED ORAL at 21:17

## 2021-01-30 RX ADMIN — THIAMINE HCL TAB 100 MG 100 MG: 100 TAB at 08:19

## 2021-01-30 RX ADMIN — Medication 2000 UNITS: at 08:19

## 2021-01-30 RX ADMIN — BISMUTH SUBSALICYLATE 524 MG: 262 LIQUID ORAL at 12:52

## 2021-01-30 RX ADMIN — FOLIC ACID 1 MG: 1 TABLET ORAL at 08:19

## 2021-01-30 RX ADMIN — DICYCLOMINE HYDROCHLORIDE 20 MG: 20 TABLET ORAL at 16:49

## 2021-01-30 RX ADMIN — DICYCLOMINE HYDROCHLORIDE 20 MG: 20 TABLET ORAL at 21:17

## 2021-01-30 NOTE — PROGRESS NOTES
Progress Note - Luz Elena BURDEN  36  [place] 78 y o  female MRN: [id number]  Unit/Bed#: [facility] Payment 394-04 Encounter: [id number]       Patient was visited on unit for continuing care; chart reviewed and discussed with the nursing staff  On approach, the patient was calm, pleasant and cooperative  Endorsed improved mood, good appetite, and energy level  No problem initiating and maintaining sleep  Denied A/VH currently  Denied SI/HI, intent or plan upon direct inquiry at this time  Patient continues to be selectively interactive with staff and peers  No reports of aggression or self-injurious behavior on unit  No PRN medications used in the past 24 hours  Patient accepted all offered medications and reported feeling better  No adverse effects of medications noted or reported        Current Mental Status Evaluation:  Appearance and attitude: appeared as stated age, dressed in hospital attire, with good hygiene  Eye contact: good  Motor Function: within normal limits, No PMA/PMR  Gait/station: Not observed  Speech: normal for rate, rhythm, volume, latency, amount  Language: Able to name objects and Able to repeat phrases  Mood/affect: euthymic / Affect was constricted but reactive, mood congruent  Thought Processes: linear  Thought content: denied suicidal ideations or homicidal ideations, no overt delusions elicited  Associations: intact associations  Perceptual disturbances: denies Auditory/Visual/Tactile Hallucinations  Orientation: oriented to place and person, but not to the time  Cognitive Function: intact  Memory: not cooperative with formal MMSE  Intellect: average  Fund of knowledge: diminished  Impulse control: good  Insight/judgment: fair/fair    Pain: denied  Pain scale: 0    Vital signs in last 24 hours:    Temp:  [97 4 °F (36 3 °C)-98 2 °F (36 8 °C)] 97 4 °F (36 3 °C)  HR:  [86-96] 86  Resp:  [16] 16  BP: (144-156)/(68-97) 156/97    Laboratory results: I have personally reviewed all pertinent laboratory/tests results    Most Recent Labs:   Lab Results   Component Value Date    WBC 6 38 12/17/2020    RBC 3 63 (L) 12/17/2020    HGB 10 4 (L) 12/17/2020    HCT 31 4 (L) 12/17/2020     12/17/2020    RDW 14 6 12/17/2020    NEUTROABS 4 37 12/17/2020    SODIUM 140 12/18/2020    K 4 5 12/18/2020     12/18/2020    CO2 26 12/18/2020    BUN 12 12/18/2020    CREATININE 0 72 12/18/2020    GLUC 82 12/18/2020    CALCIUM 8 3 12/18/2020    AST 15 12/14/2020    ALT 12 12/14/2020    ALKPHOS 81 12/14/2020    TP 6 2 (L) 12/14/2020    ALB 2 3 (L) 12/14/2020    TBILI 0 47 12/14/2020    CHOLESTEROL 210 (H) 08/03/2020    HDL 57 08/03/2020    TRIG 122 08/03/2020    LDLCALC 129 (H) 08/03/2020    [place] 153 08/03/2020    [id number] 3 830 (H) 12/14/2020       Progress Toward Goals: improving    Assessment:  Principal Problem:    Depression with suicidal ideation  Active Problems:    COVID-19 virus infection    Suicidal ideation    History of fall    History of alcohol abuse    Major neurocognitive disorder (HCC)    Anxiety    Anemia of chronic disease    Irritable bowel syndrome with diarrhea    Non-productive cough    Chronic low back pain        Plan:  - Continue medication titration and treatment plan; adjust medication to optimize treatment response and as clinically indicated       Scheduled medications:  Current Facility-Administered Medications   Medication Dose Route Frequency Provider Last Rate    acetaminophen  650 mg Oral Q6H PRN John Mcintosh MD      apixaban  2 5 mg Oral BID Filomena Barlow MD      bismuth subsalicylate  360 mg Oral Q6H PRN Liberty Jovel MD      cholecalciferol  2,000 Units Oral Daily John Mcintosh MD      dextromethorphan-guaiFENesin  5 mL Oral Q6H PRN EarMARC Arellano      dicyclomine  20 mg Oral 4x Daily (AC & HS) Liberty Jovel MD      donepezil  10 mg Oral HS John Mcintosh MD      folic acid  1 mg Oral Daily Filomena Barlow MD      haloperidol  0 5 mg Oral Q6H PRN Kimberly Tenorio MD      haloperidol  1 mg Oral Q8H PRN Kimberly Tenorio MD      haloperidol lactate  2 mg Intramuscular Q6H PRN Kimberly Tenorio MD      hydrOXYzine HCL  25 mg Oral Q6H PRN Kimberly Tenorio MD      loperamide  2 mg Oral 4x Daily PRN Reid Reyes MD      LORazepam  1 mg Intramuscular Q6H PRN Kimberly Tenorio MD      LORazepam  1 mg Oral Q6H PRN Kimberly Tenorio MD      mirtazapine  45 mg Oral HS Ericka Reed MD      multivitamin-minerals  1 tablet Oral Daily Kimberly Tenorio MD      nicotine polacrilex  4 mg Oral Q2H PRN Kimberly Tenorio MD      pantoprazole  20 mg Oral Early Morning Kimberly Tenorio MD      thiamine  100 mg Oral Daily Brittany Abraham MD      traZODone  50 mg Oral HS PRN Kimberly Tenoroi MD          PRN:  Reilly  acetaminophen    bismuth subsalicylate    dextromethorphan-guaiFENesin    haloperidol    haloperidol    haloperidol lactate    hydrOXYzine HCL    loperamide    LORazepam    LORazepam    nicotine polacrilex    traZODone    - Observation: routine    - VS: as per unit protocol  - Diet: Regular diet  - Psychoeducation (benefits and potential risks) discussed, importance of compliance with the psychiatric treatment reiterated, and the patient verbalized understanding of the matter  - Encourage group attendance    - The pt was educated and agreed to verbalize any suicidal thoughts, frustrations or concerns to the nursing staff, immediately  - Dispo: Returning to the previous living arrangement pending psychiatric stabilization      Next of Kin  · Extended Emergency Contact Information  · Primary Emergency Contact: Fern Madsen  · Mobile Phone: [phone]  · Relation: Daughter  ·  needed? No      Counseling / Coordination of Care     Total floor / unit time spent today 20 minutes  Greater than 50% of total time was spent with the patient and / or family counseling and / or coordination of care   A description of the counseling / coordination of care  Patient's Rights, confidentiality and exceptions to confidentiality, use of automated medical record, [facility] staff access to medical record, and consent to treatment reviewed      Maria M Berman MD  Attending [address]

## 2021-01-30 NOTE — NURSING NOTE
Pt visible on unit, social with select peers  Med and meal compliant  Med and meal compliant  Pt given a cough drop this morning for "dry cough"  The cough drop seemed to help this  Denies all signs and symptoms  Will continue to monitor

## 2021-01-30 NOTE — NURSING NOTE
Patient is visible on the unit however withdrawn to self    She denied any needs  Compliant with medications  VSS  Patient is in bed resting  Monitored on q 7 minute check

## 2021-01-31 VITALS
RESPIRATION RATE: 15 BRPM | WEIGHT: 122 LBS | OXYGEN SATURATION: 96 % | TEMPERATURE: 98 F | HEART RATE: 87 BPM | BODY MASS INDEX: 19.61 KG/M2 | SYSTOLIC BLOOD PRESSURE: 137 MMHG | DIASTOLIC BLOOD PRESSURE: 69 MMHG | HEIGHT: 66 IN

## 2021-01-31 LAB
FLUAV RNA RESP QL NAA+PROBE: NEGATIVE
FLUBV RNA RESP QL NAA+PROBE: NEGATIVE
RSV RNA RESP QL NAA+PROBE: NEGATIVE
SARS-COV-2 RNA RESP QL NAA+PROBE: NEGATIVE

## 2021-01-31 PROCEDURE — 99232 SBSQ HOSP IP/OBS MODERATE 35: CPT | Performed by: STUDENT IN AN ORGANIZED HEALTH CARE EDUCATION/TRAINING PROGRAM

## 2021-01-31 PROCEDURE — 0241U HB NFCT DS VIR RESP RNA 4 TRGT: CPT | Performed by: NURSE PRACTITIONER

## 2021-01-31 RX ORDER — DONEPEZIL HYDROCHLORIDE 5 MG/1
10 TABLET, FILM COATED ORAL
Status: CANCELLED | OUTPATIENT
Start: 2021-01-31

## 2021-01-31 RX ORDER — ACETAMINOPHEN 325 MG/1
650 TABLET ORAL EVERY 6 HOURS PRN
Status: CANCELLED | OUTPATIENT
Start: 2021-01-31

## 2021-01-31 RX ORDER — HALOPERIDOL 1 MG/1
1 TABLET ORAL EVERY 8 HOURS PRN
Status: CANCELLED | OUTPATIENT
Start: 2021-01-31

## 2021-01-31 RX ORDER — LOPERAMIDE HYDROCHLORIDE 2 MG/1
2 CAPSULE ORAL 4 TIMES DAILY PRN
Status: CANCELLED | OUTPATIENT
Start: 2021-01-31

## 2021-01-31 RX ORDER — DICYCLOMINE HCL 20 MG
20 TABLET ORAL
Status: CANCELLED | OUTPATIENT
Start: 2021-01-31

## 2021-01-31 RX ORDER — TRAZODONE HYDROCHLORIDE 50 MG/1
50 TABLET ORAL
Status: CANCELLED | OUTPATIENT
Start: 2021-01-31

## 2021-01-31 RX ORDER — PANTOPRAZOLE SODIUM 20 MG/1
20 TABLET, DELAYED RELEASE ORAL
Status: CANCELLED | OUTPATIENT
Start: 2021-02-01

## 2021-01-31 RX ORDER — HALOPERIDOL 5 MG/ML
2 INJECTION INTRAMUSCULAR EVERY 6 HOURS PRN
Status: CANCELLED | OUTPATIENT
Start: 2021-01-31

## 2021-01-31 RX ORDER — GUAIFENESIN/DEXTROMETHORPHAN 100-10MG/5
5 SYRUP ORAL EVERY 6 HOURS PRN
Status: CANCELLED | OUTPATIENT
Start: 2021-01-31

## 2021-01-31 RX ORDER — HYDROXYZINE HYDROCHLORIDE 25 MG/1
25 TABLET, FILM COATED ORAL EVERY 6 HOURS PRN
Status: CANCELLED | OUTPATIENT
Start: 2021-01-31

## 2021-01-31 RX ORDER — MELATONIN
2000 DAILY
Status: CANCELLED | OUTPATIENT
Start: 2021-02-01

## 2021-01-31 RX ORDER — LANOLIN ALCOHOL/MO/W.PET/CERES
100 CREAM (GRAM) TOPICAL DAILY
Status: CANCELLED | OUTPATIENT
Start: 2021-02-01

## 2021-01-31 RX ORDER — HALOPERIDOL 0.5 MG/1
0.5 TABLET ORAL EVERY 6 HOURS PRN
Status: CANCELLED | OUTPATIENT
Start: 2021-01-31

## 2021-01-31 RX ORDER — FOLIC ACID 1 MG/1
1 TABLET ORAL DAILY
Status: CANCELLED | OUTPATIENT
Start: 2021-02-01

## 2021-01-31 RX ORDER — LORAZEPAM 1 MG/1
1 TABLET ORAL EVERY 6 HOURS PRN
Status: CANCELLED | OUTPATIENT
Start: 2021-01-31

## 2021-01-31 RX ORDER — LORAZEPAM 2 MG/ML
1 INJECTION INTRAMUSCULAR EVERY 6 HOURS PRN
Status: CANCELLED | OUTPATIENT
Start: 2021-01-31

## 2021-01-31 RX ADMIN — DICYCLOMINE HYDROCHLORIDE 20 MG: 20 TABLET ORAL at 21:42

## 2021-01-31 RX ADMIN — DONEPEZIL HYDROCHLORIDE 10 MG: 10 TABLET, FILM COATED ORAL at 21:42

## 2021-01-31 RX ADMIN — THIAMINE HCL TAB 100 MG 100 MG: 100 TAB at 08:21

## 2021-01-31 RX ADMIN — Medication 2000 UNITS: at 08:21

## 2021-01-31 RX ADMIN — Medication 1 TABLET: at 08:21

## 2021-01-31 RX ADMIN — DICYCLOMINE HYDROCHLORIDE 20 MG: 20 TABLET ORAL at 11:24

## 2021-01-31 RX ADMIN — DICYCLOMINE HYDROCHLORIDE 20 MG: 20 TABLET ORAL at 06:00

## 2021-01-31 RX ADMIN — APIXABAN 2.5 MG: 2.5 TABLET, FILM COATED ORAL at 08:21

## 2021-01-31 RX ADMIN — DICYCLOMINE HYDROCHLORIDE 20 MG: 20 TABLET ORAL at 16:53

## 2021-01-31 RX ADMIN — MIRTAZAPINE 45 MG: 30 TABLET, FILM COATED ORAL at 21:42

## 2021-01-31 RX ADMIN — FOLIC ACID 1 MG: 1 TABLET ORAL at 08:21

## 2021-01-31 RX ADMIN — APIXABAN 2.5 MG: 2.5 TABLET, FILM COATED ORAL at 17:04

## 2021-01-31 RX ADMIN — PANTOPRAZOLE SODIUM 20 MG: 20 TABLET, DELAYED RELEASE ORAL at 06:00

## 2021-01-31 NOTE — PROGRESS NOTES
Progress Note - Luz Elena BURDEN  36  [place] 78 y o  female MRN: [id number]  Unit/Bed#: Ashley Sam 724-46 Encounter: [id number]       Patient was visited on unit for continuing care; chart reviewed and discussed with the nursing staff  On approach, the patient was calm, more pleasant and cooperative, walking in her room by the window, watching outside  Endorsed good mood, appetite, and energy level  No problem initiating and maintaining sleep  Denied A/VH currently  Denied SI/HI, intent or plan upon direct inquiry at this time  Patient continues to be interactive with staff and peers  No reports of aggression or self-injurious behavior on unit  No PRN medications used in the past 24 hours  Patient accepted all offered medications and reported feeling better  No adverse effects of medications noted or reported          Current Mental Status Evaluation:  Appearance and attitude: appeared as stated age, dressed in hospital attire, with good hygiene  Eye contact: good  Motor Function: within normal limits, intact gait, No PMA/PMR  Gait/station: normal gait/station and normal balance  Speech: normal for rate, rhythm, volume, latency, amount  Language: Able to name objects and Able to repeat phrases  Mood/affect: improved / Affect was constricted but reactive, mood congruent  Thought Processes: linear  Thought content: denied suicidal ideations or homicidal ideations, no overt delusions elicited  Associations: intact associations  Perceptual disturbances: denies Auditory/Visual/Tactile Hallucinations  Orientation: oriented to time, person, place and to the situational context  Cognitive Function: intact  Memory: not cooperative with formal MMSE  Intellect: average  Fund of knowledge: diminished  Impulse control: good  Insight/judgment: fair/fair    Pain: denied  Pain scale: 0    Vital signs in last 24 hours:    Temp:  [96 5 °F (35 8 °C)-98 8 °F (37 1 °C)] 96 5 °F (35 8 °C)  HR:  [81-96] 81  Resp:  [16-17] 16  BP: (126-148)/(67-72) 148/72    Laboratory results: I have personally reviewed all pertinent laboratory/tests results    Most Recent Labs:   Lab Results   Component Value Date    WBC 6 38 12/17/2020    RBC 3 63 (L) 12/17/2020    HGB 10 4 (L) 12/17/2020    HCT 31 4 (L) 12/17/2020     12/17/2020    RDW 14 6 12/17/2020    NEUTROABS 4 37 12/17/2020    SODIUM 140 12/18/2020    K 4 5 12/18/2020     12/18/2020    CO2 26 12/18/2020    BUN 12 12/18/2020    CREATININE 0 72 12/18/2020    GLUC 82 12/18/2020    CALCIUM 8 3 12/18/2020    AST 15 12/14/2020    ALT 12 12/14/2020    ALKPHOS 81 12/14/2020    TP 6 2 (L) 12/14/2020    ALB 2 3 (L) 12/14/2020    TBILI 0 47 12/14/2020    CHOLESTEROL 210 (H) 08/03/2020    HDL 57 08/03/2020    TRIG 122 08/03/2020    LDLCALC 129 (H) 08/03/2020    [place] 153 08/03/2020    [id number] 3 830 (H) 12/14/2020       Progress Toward Goals: improving    Assessment:  Principal Problem:    Depression with suicidal ideation  Active Problems:    COVID-19 virus infection    Suicidal ideation    History of fall    History of alcohol abuse    Major neurocognitive disorder (HCC)    Anxiety    Anemia of chronic disease    Irritable bowel syndrome with diarrhea    Non-productive cough    Chronic low back pain        Plan:  - Continue medication titration and treatment plan; adjust medication to optimize treatment response and as clinically indicated       Scheduled medications:  Current Facility-Administered Medications   Medication Dose Route Frequency Provider Last Rate    acetaminophen  650 mg Oral Q6H PRN Jerry Marquez MD      apixaban  2 5 mg Oral BID Dolly Flores MD      bismuth subsalicylate  565 mg Oral Q6H PRN Alisia Fabian MD      cholecalciferol  2,000 Units Oral Daily Jerry Marquez MD      dextromethorphan-guaiFENesin  5 mL Oral Q6H PRN MARC Krueger      dicyclomine  20 mg Oral 4x Daily (AC & HS) Alisia Fabian MD      donepezil  10 mg Oral HS Corey Brown MD Latanya      folic acid  1 mg Oral Daily Chavo Marquez, MD      haloperidol  0 5 mg Oral Q6H PRN Kahlotus Pod, MD      haloperidol  1 mg Oral Q8H PRN Kahlotus Pod, MD      haloperidol lactate  2 mg Intramuscular Q6H PRN Kahlotus Pod, MD      hydrOXYzine HCL  25 mg Oral Q6H PRN Kahlotus Pod, MD      loperamide  2 mg Oral 4x Daily PRN Rodolfo Garcia MD      LORazepam  1 mg Intramuscular Q6H PRN Kahlotus Pod, MD      LORazepam  1 mg Oral Q6H PRN Kahlotus Pod, MD      mirtazapine  45 mg Oral HS Enid Quintanilla MD      multivitamin-minerals  1 tablet Oral Daily Kahlotus Pod, MD      nicotine polacrilex  4 mg Oral Q2H PRN Kahlotus Pod, MD      pantoprazole  20 mg Oral Early Morning Kahlotus Pod, MD      thiamine  100 mg Oral Daily Chavodaphne Marquez, MD      traZODone  50 mg Oral HS PRN Kahlotus Pod, MD          PRN:  Kusum Drop  acetaminophen    bismuth subsalicylate    dextromethorphan-guaiFENesin    haloperidol    haloperidol    haloperidol lactate    hydrOXYzine HCL    loperamide    LORazepam    LORazepam    nicotine polacrilex    traZODone    - Observation: routine    - VS: as per unit protocol  - Diet: Regular diet  - Psychoeducation (benefits and potential risks) discussed, importance of compliance with the psychiatric treatment reiterated, and the patient verbalized understanding of the matter  - Encourage group attendance    - The pt was educated and agreed to verbalize any suicidal thoughts, frustrations or concerns to the nursing staff, immediately  - Dispo: Returning to the previous living arrangement pending psychiatric stabilization      Next of Kin  · Extended Emergency Contact Information  · Primary Emergency Contact: Fatoumata Sapp  · Mobile Phone: [phone]  · Relation: Daughter  ·  needed? No      Counseling / Coordination of Care     Total floor / unit time spent today 20 minutes   Greater than 50% of total time was spent with the patient and / or family counseling and / or coordination of care  A description of the counseling / coordination of care  Patient's Rights, confidentiality and exceptions to confidentiality, use of automated medical record, [facility] access to medical record, and consent to treatment reviewed      Haider Andrade MD  Attending [address]

## 2021-01-31 NOTE — NURSING NOTE
Patient is visible  Patient denies any needs  Stated that accepts having to go live somewhere  She said she doesn't like being alone since her  past and she needs help taking care of her self  Patient compliant with medications  Monitored on q 7 minute checks

## 2021-02-01 ENCOUNTER — HOSPITAL ENCOUNTER (INPATIENT)
Facility: HOSPITAL | Age: 81
LOS: 1 days | Discharge: HOME | DRG: 885 | End: 2021-02-01
Attending: PSYCHIATRY & NEUROLOGY | Admitting: PSYCHIATRY & NEUROLOGY
Payer: MEDICARE

## 2021-02-01 VITALS
RESPIRATION RATE: 16 BRPM | WEIGHT: 122 LBS | HEART RATE: 88 BPM | OXYGEN SATURATION: 95 % | BODY MASS INDEX: 19.61 KG/M2 | TEMPERATURE: 97.2 F | DIASTOLIC BLOOD PRESSURE: 73 MMHG | HEIGHT: 66 IN | SYSTOLIC BLOOD PRESSURE: 154 MMHG

## 2021-02-01 PROCEDURE — 99238 HOSP IP/OBS DSCHRG MGMT 30/<: CPT | Performed by: NURSE PRACTITIONER

## 2021-02-01 RX ORDER — HYDROXYZINE HYDROCHLORIDE 25 MG/1
25 TABLET, FILM COATED ORAL EVERY 6 HOURS PRN
Status: DISCONTINUED | OUTPATIENT
Start: 2021-02-01 | End: 2021-02-01 | Stop reason: HOSPADM

## 2021-02-01 RX ORDER — HALOPERIDOL 1 MG/1
1 TABLET ORAL EVERY 8 HOURS PRN
Status: DISCONTINUED | OUTPATIENT
Start: 2021-02-01 | End: 2021-02-01 | Stop reason: HOSPADM

## 2021-02-01 RX ORDER — TRAZODONE HYDROCHLORIDE 50 MG/1
50 TABLET ORAL
Status: DISCONTINUED | OUTPATIENT
Start: 2021-02-01 | End: 2021-02-01 | Stop reason: HOSPADM

## 2021-02-01 RX ORDER — PANTOPRAZOLE SODIUM 20 MG/1
20 TABLET, DELAYED RELEASE ORAL
Status: DISCONTINUED | OUTPATIENT
Start: 2021-02-01 | End: 2021-02-01 | Stop reason: HOSPADM

## 2021-02-01 RX ORDER — LANOLIN ALCOHOL/MO/W.PET/CERES
100 CREAM (GRAM) TOPICAL DAILY
Status: DISCONTINUED | OUTPATIENT
Start: 2021-02-01 | End: 2021-02-01 | Stop reason: HOSPADM

## 2021-02-01 RX ORDER — LOPERAMIDE HYDROCHLORIDE 2 MG/1
2 CAPSULE ORAL 4 TIMES DAILY PRN
Status: DISCONTINUED | OUTPATIENT
Start: 2021-02-01 | End: 2021-02-01 | Stop reason: HOSPADM

## 2021-02-01 RX ORDER — FOLIC ACID 1 MG/1
1 TABLET ORAL DAILY
Status: DISCONTINUED | OUTPATIENT
Start: 2021-02-01 | End: 2021-02-01 | Stop reason: HOSPADM

## 2021-02-01 RX ORDER — HALOPERIDOL 5 MG/ML
2 INJECTION INTRAMUSCULAR EVERY 6 HOURS PRN
Status: DISCONTINUED | OUTPATIENT
Start: 2021-02-01 | End: 2021-02-01 | Stop reason: HOSPADM

## 2021-02-01 RX ORDER — LORAZEPAM 1 MG/1
1 TABLET ORAL EVERY 6 HOURS PRN
Status: DISCONTINUED | OUTPATIENT
Start: 2021-02-01 | End: 2021-02-01 | Stop reason: HOSPADM

## 2021-02-01 RX ORDER — LORAZEPAM 2 MG/ML
1 INJECTION INTRAMUSCULAR EVERY 6 HOURS PRN
Status: DISCONTINUED | OUTPATIENT
Start: 2021-02-01 | End: 2021-02-01 | Stop reason: HOSPADM

## 2021-02-01 RX ORDER — HALOPERIDOL 0.5 MG/1
0.5 TABLET ORAL EVERY 6 HOURS PRN
Status: DISCONTINUED | OUTPATIENT
Start: 2021-02-01 | End: 2021-02-01 | Stop reason: HOSPADM

## 2021-02-01 RX ORDER — GUAIFENESIN/DEXTROMETHORPHAN 100-10MG/5
5 SYRUP ORAL EVERY 6 HOURS PRN
Status: DISCONTINUED | OUTPATIENT
Start: 2021-02-01 | End: 2021-02-01 | Stop reason: HOSPADM

## 2021-02-01 RX ORDER — DONEPEZIL HYDROCHLORIDE 5 MG/1
10 TABLET, FILM COATED ORAL
Status: DISCONTINUED | OUTPATIENT
Start: 2021-02-01 | End: 2021-02-01 | Stop reason: HOSPADM

## 2021-02-01 RX ORDER — ACETAMINOPHEN 325 MG/1
650 TABLET ORAL EVERY 6 HOURS PRN
Status: DISCONTINUED | OUTPATIENT
Start: 2021-02-01 | End: 2021-02-01 | Stop reason: HOSPADM

## 2021-02-01 RX ORDER — MELATONIN
2000 DAILY
Status: DISCONTINUED | OUTPATIENT
Start: 2021-02-01 | End: 2021-02-01 | Stop reason: HOSPADM

## 2021-02-01 RX ORDER — DICYCLOMINE HCL 20 MG
20 TABLET ORAL
Status: DISCONTINUED | OUTPATIENT
Start: 2021-02-01 | End: 2021-02-01 | Stop reason: HOSPADM

## 2021-02-01 RX ADMIN — DICYCLOMINE HYDROCHLORIDE 20 MG: 20 TABLET ORAL at 06:12

## 2021-02-01 RX ADMIN — DICYCLOMINE HYDROCHLORIDE 20 MG: 20 TABLET ORAL at 10:30

## 2021-02-01 RX ADMIN — THIAMINE HCL TAB 100 MG 100 MG: 100 TAB at 08:16

## 2021-02-01 RX ADMIN — Medication 1 TABLET: at 08:16

## 2021-02-01 RX ADMIN — Medication 2000 UNITS: at 08:16

## 2021-02-01 RX ADMIN — PANTOPRAZOLE SODIUM 20 MG: 20 TABLET, DELAYED RELEASE ORAL at 06:12

## 2021-02-01 RX ADMIN — FOLIC ACID 1 MG: 1 TABLET ORAL at 08:16

## 2021-02-01 RX ADMIN — APIXABAN 2.5 MG: 2.5 TABLET, FILM COATED ORAL at 08:16

## 2021-02-01 NOTE — NURSING NOTE
Patient visible on the unit, social with staff and peers  Pleasant and cooperative  Medication and meal compliant, appetite good  VSS  Will continue to monitor frequently

## 2021-02-01 NOTE — CASE MANAGEMENT
Spoke to Sola's daughter Wai Mora and she is still planning to  her mother today at 11:00AM  She was informed she could delay discharge till 2/2- but she feared tomorrow may be worse  Dr Katt Velez and pt are aware of this pending DC

## 2021-02-01 NOTE — PROGRESS NOTES
02/01/21 1508   Team Meeting   Meeting Type Daily Rounds   Initial Conference Date 02/01/21   Next Conference Date 02/02/21   Team Members Present   Team Members Present Physician;Nurse;   Physician Team Member Carolina   Nursing Team Member [id number] Team Member [place]   Patient/Family Present   Patient Present No   Patient's Family Present No   Eats, pleasant, Med compliant, slept, Dc today to daughter Wiliamyoni Mary

## 2021-02-01 NOTE — DISCHARGE INSTRUCTIONS
Depression   AMBULATORY CARE:   Depression  is a medical condition that causes feelings of sadness or hopelessness that do not go away  Depression may cause you to lose interest in things you used to enjoy  These feelings may interfere with your daily life  Common symptoms include the following:   · Appetite changes, or weight gain or loss    · Trouble going to sleep or staying asleep, or sleeping too much    · Fatigue or lack of energy    · Feeling restless, irritable, or withdrawn    · Feeling worthless, hopeless, discouraged, or guilty    · Trouble concentrating, remembering things, doing daily tasks, or making decisions    · Thoughts about hurting or killing yourself    Call your local emergency number (911 in the [address]) if:   · You think about harming yourself or someone else  · You have done something on purpose to hurt yourself  Call your therapist or doctor if:   · Your symptoms do not improve  · You cannot make it to your next appointment  · You have new symptoms  · You have questions or concerns about your condition or care  The following resources are available at any time to help you, if needed:   · [address] [place] [address]: [phone] (7-031-062-QUF)     · Suicide Hotline: 1-156.615.5271 (7-102-EIHKTUY)     · For a list of international numbers: https://save org/find-help/international-resources/    Treatment for depression  may include medicine to relieve depression  Medicine is often used together with therapy  Therapy is a way for you to talk about your feelings and anything that may be causing depression  Therapy can be done alone or in a group  It may also be done with family members or a significant other  Self-care:   · Get regular physical activity  Try to be active for 30 minutes, 3 to 5 days a week  Physical activity can help relieve depression  Work with your healthcare provider to develop a plan that you enjoy   It may help to ask someone to be active with you     · Create a regular sleep schedule  A routine can help you relax before bed  Listen to music, read, or do yoga  Try to go to bed and wake up at the same time every day  Sleep is important for emotional health  · Eat a variety of healthy foods  Healthy foods include fruits, vegetables, whole-grain breads, low-fat dairy products, lean meats, fish, and cooked beans  A healthy meal plan is low in fat, salt, and added sugar  · Do not drink alcohol or use drugs  Alcohol and drugs can make depression worse  Talk to your therapist or doctor if you need help quitting  Follow up with your healthcare provider as directed: Your healthcare provider will monitor your progress at follow-up visits  He or she will also monitor your medicine if you take antidepressants  Your healthcare provider will ask if the medicine is helping  Tell him or her about any side effects or problems you may have with your medicine  The type or amount of medicine may need to be changed  Write down your questions so you remember to ask them during your visits  © Copyright [address] Information is for End User's use only and may not be sold, redistributed or otherwise used for commercial purposes  All illustrations and images included in CareNotes® are the copyrighted property of A D A M , Inc  or [address] [place], [place] [address]   The above information is an  only  It is not intended as medical advice for individual conditions or treatments  Talk to your doctor, nurse or pharmacist before following any medical regimen to see if it is safe and effective for you

## 2021-02-01 NOTE — NURSING NOTE
Patient is medication and meal compliant  No complaints of pain or discomfort  Patient is visible on the unit and social with peers  Patient denies symptoms of depression 0/10 or anxiety 0/4  Patient is scheduled for discharge today with her daughter to pick her up around 1100  No new concerns noted  Will continue to monitor patient for changes in mood or behavior

## 2021-02-01 NOTE — PLAN OF CARE
Problem: Risk for Self Injury/Neglect  Goal: Treatment Goal: Remain safe during length of stay, learn and adopt new coping skills, and be free of self-injurious ideation, impulses and acts at the time of discharge  Outcome: Adequate for Discharge     Problem: Depression  Goal: Treatment Goal: Demonstrate behavioral control of depressive symptoms, verbalize feelings of improved mood/affect, and adopt new coping skills prior to discharge  Outcome: Adequate for Discharge  Goal: Verbalize thoughts and feelings  Description: Interventions:  - Assess and re-assess patient's level of risk   - Engage patient in 1:1 interactions, daily, for a minimum of 15 minutes   - Encourage patient to express feelings, fears, frustrations, hopes   Outcome: Adequate for Discharge  Goal: Refrain from harming self  Description: Interventions:  - Monitor patient closely, per order   - Supervise medication ingestion, monitor effects and side effects   Outcome: Adequate for Discharge  Goal: Refrain from isolation  Description: Interventions:  - Develop a trusting relationship   - Encourage socialization   Outcome: Adequate for Discharge  Goal: Refrain from self-neglect  Outcome: Adequate for Discharge  Goal: Attend and participate in unit activities, including therapeutic, recreational, and educational groups  Description: Interventions:  - Provide therapeutic and educational activities daily, encourage attendance and participation, and document same in the medical record   Outcome: Adequate for Discharge  Goal: Complete daily ADLs, including personal hygiene independently, as able  Description: Interventions:  - Observe, teach, and assist patient with ADLS  -  Monitor and promote a balance of rest/activity, with adequate nutrition and elimination   Outcome: Adequate for Discharge     Problem: Anxiety  Goal: Anxiety is at manageable level  Description: Interventions:  - Assess and monitor patient's anxiety level     - Monitor for signs and symptoms (heart palpitations, chest pain, shortness of breath, headaches, nausea, feeling jumpy, restlessness, irritable, apprehensive)  - Collaborate with interdisciplinary team and initiate plan and interventions as ordered    - [place] patient to unit/surroundings  - Explain treatment plan  - Encourage participation in care  - Encourage verbalization of concerns/fears  - Identify coping mechanisms  - Assist in developing anxiety-reducing skills  - Administer/offer alternative therapies  - Limit or eliminate stimulants  Outcome: Adequate for Discharge     Problem: Ineffective Coping  Goal: Participates in unit activities  Description: Interventions:  - Provide therapeutic environment   - Provide required programming   - Redirect inappropriate behaviors   Outcome: Adequate for Discharge     Problem: DISCHARGE PLANNING  Goal: Discharge to home or other facility with appropriate resources  Description: INTERVENTIONS:  - Identify barriers to discharge w/patient and caregiver  - Arrange for needed discharge resources and transportation as appropriate  - Identify discharge learning needs (meds, wound care, etc )  - Refer to Case Management Department for coordinating discharge planning if the patient needs post-hospital services based on physician/advanced practitioner order or complex needs related to functional status, cognitive ability, or social support system  Outcome: Adequate for Discharge     Problem: Prexisting or High Potential for Compromised Skin Integrity  Goal: Skin integrity is maintained or improved  Description: INTERVENTIONS:  - Identify patients at risk for skin breakdown  - Assess and monitor skin integrity  - Assess and monitor nutrition and hydration status  - Monitor labs   - Assess for incontinence   - Turn and reposition patient  - Assist with mobility/ambulation  - Relieve pressure over bony prominences  - Avoid friction and shearing  - Provide appropriate hygiene as needed including keeping skin clean and dry  - Evaluate need for skin moisturizer/barrier cream  - Collaborate with interdisciplinary team   - Patient/family teaching  - Consider wound care consult   Outcome: Adequate for Discharge     Problem: Potential for Falls  Goal: Patient will remain free of falls  Description: INTERVENTIONS:  - Assess patient frequently for physical needs  -  Identify cognitive and physical deficits and behaviors that affect risk of falls    -  [place] fall precautions as indicated by assessment   - Educate patient/family on patient safety including physical limitations  - Instruct patient to call for assistance with activity based on assessment  - Modify environment to reduce risk of injury  - Consider OT/PT consult to assist with strengthening/mobility  Outcome: Adequate for Discharge

## 2021-02-01 NOTE — NURSING NOTE
Patient discharged to daughter's home with daughter picking her up and transporting her  Discharge instructions reviewed with patient and understood by same  All personal belongings returned to patient including valuables  Patient escorted to lobby by staff and was discharged to home with daughter  No issues noted with discharge process

## 2021-02-01 NOTE — DISCHARGE SUMMARY
Discharge Summary - Luz Elena BURDEN  36  [place] 78 y o  female MRN: [id number]  Unit/Bed#: [facility] 389-15 Encounter: [id number]     Admission Date: 2/1/2021         Discharge Date: 02/01/2021     Attending Psychiatrist: Ariel Grier MD    Reason for Admission/HPI: Copied from HPI dated 12/20/2020    "Chief Complaint:  Depression, suicidal thoughts        History of Present Illness        Sandeep Mayorga is a 78 y o  female with history of alcohol use disorder, cognitive deficit on Aricept, and recurrent major depressive disorder who presents from medical floor voluntarily via a 201 due to suicidal thoughts since early December with verbalized plan [place], apparently prompted by her 's deteriorating condition due to [facility]  Patient was initially admitted on December 12 to the medical unit due to multiple falls (thought to be related to heavy alcohol use) and was found to have COVID, though she was largely asymptomatic and was treated only for a mild case  During her hospital stay, her  did pass away from [facility]  She was seen via tele psychiatry due to her suicidal thoughts, though she was reportedly a poor historian and denied problems with alcohol, so much of the history was obtained from the patient's daughter at that time  She was also monitored on CIWA protocol, which was discontinued by the end of her medical hospitalization      Sandeep Mayorga states she has been having suicidal thoughts with a plan to walk in to traffic since before her  became sick with COVID  She cannot recall any specific triggers or stressors at that time and cannot say why her depression started  However, she reports worsening of her suicidal thoughts since her 's death 2 days ago  She states she has not acted on these thoughts yet because of her family, though she cannot say at this time whether she would make a suicide attempt if discharge   She does report of history of depression in the past but is unable to elaborate regarding her previous treatment  She currently reports poor sleep, frequently falling asleep too early and only sleeping about 1 hour at a time  She reports low energy at times with low appetite, which she states is her baseline  She continues with suicidal thoughts and contracts for safety in the hospital but states that she is afraid to be alone  She states she is likewise afraid to be alone at home because she worries about someone potentially breaking into her home, so she has been discussing possible living arrangements with her children since she would otherwise be living alone following discharge  She denies any homicidal thoughts, auditory or visual hallucinations, or symptoms suggestive of psychosis, bipolar disorder, or PTSD"         Meds/Allergies     current meds:   Current Facility-Administered Medications   Medication Dose Route Frequency    acetaminophen (TYLENOL) tablet 650 mg  650 mg Oral Q6H PRN    apixaban (ELIQUIS) tablet 2 5 mg  2 5 mg Oral BID    bismuth subsalicylate (PEPTO BISMOL) oral suspension 524 mg  524 mg Oral Q6H PRN    cholecalciferol (VITAMIN D3) tablet 2,000 Units  2,000 Units Oral Daily    dextromethorphan-guaiFENesin (ROBITUSSIN DM) oral syrup 5 mL  5 mL Oral Q6H PRN    dicyclomine (BENTYL) tablet 20 mg  20 mg Oral 4x Daily (AC & HS)    donepezil (ARICEPT) tablet 10 mg  10 mg Oral HS    folic acid (FOLVITE) tablet 1 mg  1 mg Oral Daily    haloperidol (HALDOL) tablet 0 5 mg  0 5 mg Oral Q6H PRN    haloperidol (HALDOL) tablet 1 mg  1 mg Oral Q8H PRN    haloperidol lactate (HALDOL) injection 2 mg  2 mg Intramuscular Q6H PRN    hydrOXYzine HCL (ATARAX) tablet 25 mg  25 mg Oral Q6H PRN    loperamide (IMODIUM) capsule 2 mg  2 mg Oral 4x Daily PRN    LORazepam (ATIVAN) injection 1 mg  1 mg Intramuscular Q6H PRN    LORazepam (ATIVAN) tablet 1 mg  1 mg Oral Q6H PRN    mirtazapine (REMERON) tablet 45 mg  45 mg Oral HS  multivitamin-minerals (CENTRUM) tablet 1 tablet  1 tablet Oral Daily    nicotine polacrilex (NICORETTE) gum 4 mg  4 mg Oral Q2H PRN    pantoprazole (PROTONIX) EC tablet 20 mg  20 mg Oral Early Morning    thiamine tablet 100 mg  100 mg Oral Daily    traZODone (DESYREL) tablet 50 mg  50 mg Oral HS PRN       Allergies   Allergen Reactions    Penicillins      Does not know reaction       Objective     Vital signs in last 24 hours:  Temp:  [97 2 °F (36 2 °C)-98 °F (36 7 °C)] 97 2 °F (36 2 °C)  HR:  [87-89] 88  Resp:  [15-16] 16  BP: (120-154)/(62-73) 154/73      Intake/Output Summary (Last 24 hours) at 2/1/2021 0941  Last data filed at 2/1/2021 0855  Gross per 24 hour   Intake 300 ml   Output --   Net 300 ml       Hospital Course: The patient was admitted to the inpatient psychiatric unit and started on every 15 minutes precautions  During the hospitalization the patient was attending individual therapy, group therapy, milieu therapy and occupational therapy  Psychiatric medications were titrated over the hospital stay  To address depression, depressive symptoms and cognitive difficulties the patient was started on antidepressant Remeron and cognitve enhancer Aricept  Medication doses were titrated during the hospital course  Prior to beginning of treatment medications risks and benefits and possible side effects including risk of suicidality and serotonin syndrome related to treatment with antidepressants were reviewed with the patient  The patient verbalized understanding and agreement for treatment  Upon admission the patient was anxious, depressed, seclusive and not eating or sleeping well  She was having suicidal thoughts to walk into traffic to go be with her   She was preoccupied with back pain as well as dependent on staff stating she could not walk without help and was too weak   We did complete SLUMS testing as patient showed fluctuating cognition and she scored 11/30 with deficits in all areas  We also had her assessed by OP for cognitive assessment and then discussed findings and recommendations with the patients daughter/POA  They decided on placement  Patient's symptoms improved gradually over the hospital course  At the end of treatment the patient was doing well  She was no longer depressed or anxious  She was visible, social, particpating in groups and better oriented  She began eating an sleeping normally  She was more future oriented and no longer expressed any suicidal thoughts  She was up ambulating without staff assistance and no longer reporting any pain  Mood was stable at the time of discharge  The patient denied suicidal ideation, intent or plan at the time of discharge and denied homicidal ideation, intent or plan at the time of discharge  There was no overt psychosis at the time of discharge  Sleep and appetite were improved  The patient was tolerating medications and was not reporting any significant side effects at the time of discharge  Since the patient was doing well at the end of the hospitalization, treatment team felt that the patient could be safely discharged to outpatient care  The outpatient follow up  was arranged by the unit  upon discharge      Mental Status at Time of Discharge:   Appearance:  Adequate hygiene and grooming and Good eye contact   Behavior:  cooperative   Speech:   Language: Sparse  No overt abnormality   Mood:  euthymic   Affect:   Associations: constricted  Tightly connected   Thought Process:  Goal directed and coherent   Thought Content:  Does not verbalize delusional material   Perceptual Disturbances: Denies hallucinations and does not appear to be responding to internal stimuli     Risk Potential: No suicidal or homicidal ideation   Orientation   Language Oriented to place and person  anomia no   Memory  Fund of knowledge Short term impaired  Unable to assess due to patient factors   Attention/Concentration Daniel than expected   Insight:  limited   Judgment: Limited   Gait/Station: slow   Motor Activity: No abnormal movement noted       Admission Diagnosis:  Principal Problem:    Depression with suicidal ideation      Discharge Diagnosis:     Principal Problem:    Depression with suicidal ideation  Resolved Problems:    * No resolved hospital problems  *      Lab results:    Admission on 12/19/2020, Discharged on 02/01/2021   Component Date Value    Ventricular Rate 12/23/2020 104     Atrial Rate 12/23/2020 104     KS Interval 12/23/2020 152     QRSD Interval 12/23/2020 80     QT Interval 12/23/2020 334     QTC Interval 12/23/2020 439     P Axis 12/23/2020 36     QRS Axis 12/23/2020 -2     T Wave Axis 12/23/2020 33     SARS-CoV-2 01/31/2021 Negative     INFLUENZA A PCR 01/31/2021 Negative     INFLUENZA B PCR 01/31/2021 Negative     RSV PCR 01/31/2021 Negative        Discharge Medications:    See after visit summary for reconciled discharge medications provided to patient and family  Discharge instructions/Information to patient and family:     See after visit summary for information provided to patient and family  Provisions for Follow-Up Care:    See after visit summary for information related to follow-up care and any pertinent home health orders  Discharge Statement     I spent 30 minutes discharging the patient  This time was spent on the day of discharge  I had direct contact with the patient on the day of discharge

## 2021-02-01 NOTE — PLAN OF CARE
Problem: Risk for Self Injury/Neglect  Goal: Treatment Goal: Remain safe during length of stay, learn and adopt new coping skills, and be free of self-injurious ideation, impulses and acts at the time of discharge  Outcome: Progressing     Problem: Depression  Goal: Treatment Goal: Demonstrate behavioral control of depressive symptoms, verbalize feelings of improved mood/affect, and adopt new coping skills prior to discharge  Outcome: Progressing  Goal: Verbalize thoughts and feelings  Description: Interventions:  - Assess and re-assess patient's level of risk   - Engage patient in 1:1 interactions, daily, for a minimum of 15 minutes   - Encourage patient to express feelings, fears, frustrations, hopes   Outcome: Progressing  Goal: Refrain from harming self  Description: Interventions:  - Monitor patient closely, per order   - Supervise medication ingestion, monitor effects and side effects   Outcome: Progressing  Goal: Refrain from isolation  Description: Interventions:  - Develop a trusting relationship   - Encourage socialization   Outcome: Progressing  Goal: Refrain from self-neglect  Outcome: Progressing  Goal: Attend and participate in unit activities, including therapeutic, recreational, and educational groups  Description: Interventions:  - Provide therapeutic and educational activities daily, encourage attendance and participation, and document same in the medical record   Outcome: Progressing  Goal: Complete daily ADLs, including personal hygiene independently, as able  Description: Interventions:  - Observe, teach, and assist patient with ADLS  -  Monitor and promote a balance of rest/activity, with adequate nutrition and elimination   Outcome: Progressing     Problem: Anxiety  Goal: Anxiety is at manageable level  Description: Interventions:  - Assess and monitor patient's anxiety level     - Monitor for signs and symptoms (heart palpitations, chest pain, shortness of breath, headaches, nausea, feeling jumpy, restlessness, irritable, apprehensive)  - Collaborate with interdisciplinary team and initiate plan and interventions as ordered    - [place] patient to unit/surroundings  - Explain treatment plan  - Encourage participation in care  - Encourage verbalization of concerns/fears  - Identify coping mechanisms  - Assist in developing anxiety-reducing skills  - Administer/offer alternative therapies  - Limit or eliminate stimulants  Outcome: Progressing     Problem: Ineffective Coping  Goal: Participates in unit activities  Description: Interventions:  - Provide therapeutic environment   - Provide required programming   - Redirect inappropriate behaviors   Outcome: Progressing     Problem: DISCHARGE PLANNING  Goal: Discharge to home or other facility with appropriate resources  Description: INTERVENTIONS:  - Identify barriers to discharge w/patient and caregiver  - Arrange for needed discharge resources and transportation as appropriate  - Identify discharge learning needs (meds, wound care, etc )  - Refer to Case Management Department for coordinating discharge planning if the patient needs post-hospital services based on physician/advanced practitioner order or complex needs related to functional status, cognitive ability, or social support system  Outcome: Progressing     Problem: Prexisting or High Potential for Compromised Skin Integrity  Goal: Skin integrity is maintained or improved  Description: INTERVENTIONS:  - Identify patients at risk for skin breakdown  - Assess and monitor skin integrity  - Assess and monitor nutrition and hydration status  - Monitor labs   - Assess for incontinence   - Turn and reposition patient  - Assist with mobility/ambulation  - Relieve pressure over bony prominences  - Avoid friction and shearing  - Provide appropriate hygiene as needed including keeping skin clean and dry  - Evaluate need for skin moisturizer/barrier cream  - Collaborate with interdisciplinary team   - Patient/family teaching  - Consider wound care consult   Outcome: Progressing     Problem: Potential for Falls  Goal: Patient will remain free of falls  Description: INTERVENTIONS:  - Assess patient frequently for physical needs  -  Identify cognitive and physical deficits and behaviors that affect risk of falls    -  [place] fall precautions as indicated by assessment   - Educate patient/family on patient safety including physical limitations  - Instruct patient to call for assistance with activity based on assessment  - Modify environment to reduce risk of injury  - Consider OT/PT consult to assist with strengthening/mobility  Outcome: Progressing

## 2021-02-19 ENCOUNTER — HOSPITAL ENCOUNTER (INPATIENT)
Facility: HOSPITAL | Age: 81
LOS: 3 days | Discharge: NURSING, REHAB OR LONG-TERM CARE | DRG: 552 | End: 2021-02-23
Attending: EMERGENCY MEDICINE | Admitting: INTERNAL MEDICINE
Payer: MEDICARE

## 2021-02-19 ENCOUNTER — APPOINTMENT (EMERGENCY)
Dept: RADIOLOGY | Facility: HOSPITAL | Age: 81
DRG: 552 | End: 2021-02-19
Payer: MEDICARE

## 2021-02-19 DIAGNOSIS — G47.00 INSOMNIA: ICD-10-CM

## 2021-02-19 DIAGNOSIS — R26.2 AMBULATORY DYSFUNCTION: ICD-10-CM

## 2021-02-19 DIAGNOSIS — M54.50 LOW BACK PAIN: Primary | ICD-10-CM

## 2021-02-19 LAB
ANION GAP SERPL CALCULATED.3IONS-SCNC: 6 MMOL/L (ref 4–13)
BASOPHILS # BLD AUTO: 0.05 THOUSANDS/ΜL (ref 0–0.1)
BASOPHILS NFR BLD AUTO: 1 % (ref 0–1)
BUN SERPL-MCNC: 20 MG/DL (ref 5–25)
CALCIUM SERPL-MCNC: 8.2 MG/DL (ref 8.3–10.1)
CHLORIDE SERPL-SCNC: 104 MMOL/L (ref 100–108)
CO2 SERPL-SCNC: 29 MMOL/L (ref 21–32)
CREAT SERPL-MCNC: 1.02 MG/DL (ref 0.6–1.3)
EOSINOPHIL # BLD AUTO: 0.25 THOUSAND/ΜL (ref 0–0.61)
EOSINOPHIL NFR BLD AUTO: 3 % (ref 0–6)
ERYTHROCYTE [DISTWIDTH] IN BLOOD BY AUTOMATED COUNT: 17.1 % (ref 11.6–15.1)
GFR SERPL CREATININE-BSD FRML MDRD: 51 ML/MIN/1.73SQ M
GLUCOSE SERPL-MCNC: 88 MG/DL (ref 65–140)
HCT VFR BLD AUTO: 33.4 % (ref 34.8–46.1)
HGB BLD-MCNC: 10.3 G/DL (ref 11.5–15.4)
IMM GRANULOCYTES # BLD AUTO: 0.03 THOUSAND/UL (ref 0–0.2)
IMM GRANULOCYTES NFR BLD AUTO: 0 % (ref 0–2)
LYMPHOCYTES # BLD AUTO: 1.58 THOUSANDS/ΜL (ref 0.6–4.47)
LYMPHOCYTES NFR BLD AUTO: 16 % (ref 14–44)
MCH RBC QN AUTO: 27.8 PG (ref 26.8–34.3)
MCHC RBC AUTO-ENTMCNC: 30.8 G/DL (ref 31.4–37.4)
MCV RBC AUTO: 90 FL (ref 82–98)
MONOCYTES # BLD AUTO: 1.35 THOUSAND/ΜL (ref 0.17–1.22)
MONOCYTES NFR BLD AUTO: 14 % (ref 4–12)
NEUTROPHILS # BLD AUTO: 6.51 THOUSANDS/ΜL (ref 1.85–7.62)
NEUTS SEG NFR BLD AUTO: 66 % (ref 43–75)
NRBC BLD AUTO-RTO: 0 /100 WBCS
PLATELET # BLD AUTO: 321 THOUSANDS/UL (ref 149–390)
PMV BLD AUTO: 8.4 FL (ref 8.9–12.7)
POTASSIUM SERPL-SCNC: 4.6 MMOL/L (ref 3.5–5.3)
RBC # BLD AUTO: 3.7 MILLION/UL (ref 3.81–5.12)
SODIUM SERPL-SCNC: 139 MMOL/L (ref 136–145)
WBC # BLD AUTO: 9.77 THOUSAND/UL (ref 4.31–10.16)

## 2021-02-19 PROCEDURE — 99285 EMERGENCY DEPT VISIT HI MDM: CPT

## 2021-02-19 PROCEDURE — 99220 PR INITIAL OBSERVATION CARE/DAY 70 MINUTES: CPT | Performed by: INTERNAL MEDICINE

## 2021-02-19 PROCEDURE — 80048 BASIC METABOLIC PNL TOTAL CA: CPT | Performed by: EMERGENCY MEDICINE

## 2021-02-19 PROCEDURE — 96374 THER/PROPH/DIAG INJ IV PUSH: CPT

## 2021-02-19 PROCEDURE — 85025 COMPLETE CBC W/AUTO DIFF WBC: CPT | Performed by: EMERGENCY MEDICINE

## 2021-02-19 PROCEDURE — 36415 COLL VENOUS BLD VENIPUNCTURE: CPT | Performed by: EMERGENCY MEDICINE

## 2021-02-19 PROCEDURE — 96375 TX/PRO/DX INJ NEW DRUG ADDON: CPT

## 2021-02-19 PROCEDURE — 99285 EMERGENCY DEPT VISIT HI MDM: CPT | Performed by: EMERGENCY MEDICINE

## 2021-02-19 PROCEDURE — 1124F ACP DISCUSS-NO DSCNMKR DOCD: CPT | Performed by: EMERGENCY MEDICINE

## 2021-02-19 PROCEDURE — 72100 X-RAY EXAM L-S SPINE 2/3 VWS: CPT

## 2021-02-19 RX ORDER — DONEPEZIL HYDROCHLORIDE 5 MG/1
10 TABLET, FILM COATED ORAL
Status: DISCONTINUED | OUTPATIENT
Start: 2021-02-19 | End: 2021-02-23 | Stop reason: HOSPADM

## 2021-02-19 RX ORDER — ACETAMINOPHEN 325 MG/1
975 TABLET ORAL EVERY 8 HOURS SCHEDULED
Status: DISCONTINUED | OUTPATIENT
Start: 2021-02-19 | End: 2021-02-23 | Stop reason: HOSPADM

## 2021-02-19 RX ORDER — DICYCLOMINE HCL 20 MG
20 TABLET ORAL
Status: DISCONTINUED | OUTPATIENT
Start: 2021-02-19 | End: 2021-02-23 | Stop reason: HOSPADM

## 2021-02-19 RX ORDER — PANTOPRAZOLE SODIUM 20 MG/1
20 TABLET, DELAYED RELEASE ORAL
Status: DISCONTINUED | OUTPATIENT
Start: 2021-02-20 | End: 2021-02-23 | Stop reason: HOSPADM

## 2021-02-19 RX ORDER — HYDROMORPHONE HCL/PF 1 MG/ML
0.2 SYRINGE (ML) INJECTION EVERY 4 HOURS PRN
Status: DISCONTINUED | OUTPATIENT
Start: 2021-02-19 | End: 2021-02-23 | Stop reason: HOSPADM

## 2021-02-19 RX ORDER — OXYCODONE HYDROCHLORIDE 5 MG/1
5 TABLET ORAL EVERY 4 HOURS PRN
Status: DISCONTINUED | OUTPATIENT
Start: 2021-02-19 | End: 2021-02-23 | Stop reason: HOSPADM

## 2021-02-19 RX ORDER — LANOLIN ALCOHOL/MO/W.PET/CERES
100 CREAM (GRAM) TOPICAL DAILY
Status: DISCONTINUED | OUTPATIENT
Start: 2021-02-20 | End: 2021-02-23 | Stop reason: HOSPADM

## 2021-02-19 RX ORDER — MIRTAZAPINE 15 MG/1
45 TABLET, FILM COATED ORAL
Status: DISCONTINUED | OUTPATIENT
Start: 2021-02-19 | End: 2021-02-23 | Stop reason: HOSPADM

## 2021-02-19 RX ORDER — OXYCODONE HYDROCHLORIDE 5 MG/1
2.5 TABLET ORAL EVERY 4 HOURS PRN
Status: DISCONTINUED | OUTPATIENT
Start: 2021-02-19 | End: 2021-02-23 | Stop reason: HOSPADM

## 2021-02-19 RX ORDER — DOCUSATE SODIUM 100 MG/1
100 CAPSULE, LIQUID FILLED ORAL 2 TIMES DAILY
Status: DISCONTINUED | OUTPATIENT
Start: 2021-02-19 | End: 2021-02-23 | Stop reason: HOSPADM

## 2021-02-19 RX ORDER — ONDANSETRON 2 MG/ML
4 INJECTION INTRAMUSCULAR; INTRAVENOUS EVERY 4 HOURS PRN
Status: DISCONTINUED | OUTPATIENT
Start: 2021-02-19 | End: 2021-02-23 | Stop reason: HOSPADM

## 2021-02-19 RX ORDER — MELATONIN
2000 DAILY
Status: DISCONTINUED | OUTPATIENT
Start: 2021-02-20 | End: 2021-02-23 | Stop reason: HOSPADM

## 2021-02-19 RX ORDER — FENTANYL CITRATE 50 UG/ML
25 INJECTION, SOLUTION INTRAMUSCULAR; INTRAVENOUS ONCE
Status: COMPLETED | OUTPATIENT
Start: 2021-02-19 | End: 2021-02-19

## 2021-02-19 RX ORDER — FOLIC ACID 1 MG/1
1 TABLET ORAL DAILY
Status: DISCONTINUED | OUTPATIENT
Start: 2021-02-20 | End: 2021-02-23 | Stop reason: HOSPADM

## 2021-02-19 RX ADMIN — DICLOFENAC SODIUM 2 G: 10 GEL TOPICAL at 22:36

## 2021-02-19 RX ADMIN — DOCUSATE SODIUM 100 MG: 100 CAPSULE, LIQUID FILLED ORAL at 19:01

## 2021-02-19 RX ADMIN — ACETAMINOPHEN 975 MG: 325 TABLET, FILM COATED ORAL at 15:18

## 2021-02-19 RX ADMIN — FENTANYL CITRATE 25 MCG: 50 INJECTION INTRAMUSCULAR; INTRAVENOUS at 11:52

## 2021-02-19 RX ADMIN — DICYCLOMINE HYDROCHLORIDE 20 MG: 20 TABLET ORAL at 22:35

## 2021-02-19 RX ADMIN — ENOXAPARIN SODIUM 40 MG: 40 INJECTION SUBCUTANEOUS at 16:28

## 2021-02-19 RX ADMIN — MIRTAZAPINE 45 MG: 15 TABLET, FILM COATED ORAL at 22:35

## 2021-02-19 RX ADMIN — ACETAMINOPHEN 975 MG: 325 TABLET, FILM COATED ORAL at 22:35

## 2021-02-19 RX ADMIN — DONEPEZIL HYDROCHLORIDE 10 MG: 5 TABLET, FILM COATED ORAL at 22:35

## 2021-02-19 RX ADMIN — MORPHINE SULFATE 2 MG: 2 INJECTION, SOLUTION INTRAMUSCULAR; INTRAVENOUS at 12:43

## 2021-02-19 NOTE — ASSESSMENT & PLAN NOTE
·  No saddle anesthesia, bladder/bowel incontinence or sensory disturbance in lower extremities  ·  Prescribed prednisone on her last visit to her  PCP  Confirmed by daughter  History of osteoporosis  · X-ray lumbar spine: Chronic compression deformities L1 and L2, unchanged from previous imaging studies   Multilevel degenerative spondylosis  also noted  · Able to ambulate without assistance, S/p   Pain management with fentanyl and morphine    Plan:  · Continue pain management  · PT/OT  · Case management consulted for possible placement a [place]  · Fall precautions in place

## 2021-02-19 NOTE — ED NOTES
Pt states I didn't hard enough to get her to walk   Pt got out of bed and walked unaided over 30 ft to restroom     Daniel Gupta RN  02/19/21 [id number]

## 2021-02-19 NOTE — ED PROVIDER NOTES
History  Chief Complaint   Patient presents with    Back Pain     pt reports backpain since last night, unable to ambulate or lay down per pt  Per EMS pt was refusing to ambulate d/t pain  able to ambulate with assistance at residence  Galina Pinedo is a 78 y o  female w/ PMH hyponatremia, EtOH intoxication, recent asx COVID, chronic LBP, depression w/ SI presents for severe LBP that started this morning and prevented her from getting out of bed  Patient reports that she douglas had this type of pain intermittently for "a while" and daughter reports that this was just addressed at a recent visit to the family doctor who prescribed a 6 day steroid taper (which was just completed a couple of days ago)  Patient denies any new bowel or bladder sx, numbness around the groin or buttocks (though does endorse chronic, intermittent experience of both numbness and incontinence), denies fevers or recent illnesses  She notes that the pain wraps around to the abdomen as well at times and is so bad that it prevents her from moving at all  Daughter notes that patient was actually able to get out of bed and walk to a chair this morning, and when EMS came was able to walk with help to the ambulance  Daughter states that there has been some tension at home w/ recent death of patient's  and needing to move in w/ daughter  Daughter notes possible upcoming placement with Kaitlin w/ some additional relationship strain        Prior to Admission Medications   Prescriptions Last Dose Informant Patient Reported? Taking?    Multiple Vitamin (MULTIVITAMIN) tablet 2/19/2021 at 0900  Yes Yes   Sig: Take 1 tablet by mouth daily   apixaban (ELIQUIS) 2 5 mg 2/19/2021 at 0900  No No   Sig: Take 1 tablet (2 5 mg total) by mouth 2 (two) times a day   cholecalciferol (VITAMIN D3) 1,000 units tablet 2/19/2021 at 0900  No Yes   Sig: Take 2 tablets (2,000 Units total) by mouth daily   dicyclomine (BENTYL) 20 mg tablet Not Taking at Unknown time  No No   Sig: Take 1 tablet (20 mg total) by mouth 4 (four) times a day (before meals and at bedtime)   Patient not taking: Reported on 2/19/2021   donepezil (ARICEPT) 10 mg tablet 2/18/2021 at 2100  No Yes   Sig: Take 1 tablet (10 mg total) by mouth daily at bedtime   folic acid (FOLVITE) 1 mg tablet 2/19/2021 at 0900  No Yes   Sig: Take 1 tablet (1 mg total) by mouth daily   loperamide (IMODIUM) 2 mg capsule Not Taking at Unknown time  No No   Sig: Take 1 capsule (2 mg total) by mouth 3 (three) times a day as needed for diarrhea   Patient not taking: Reported on 2/19/2021   mirtazapine (REMERON) 45 MG tablet 2/18/2021 at 2100  No Yes   Sig: Take 1 tablet (45 mg total) by mouth daily at bedtime   omeprazole (PriLOSEC) 10 mg delayed release capsule 2/19/2021 at 0900  Yes Yes   Sig: Take 10 mg by mouth daily   thiamine 100 MG tablet 2/19/2021 at 0900  No Yes   Sig: Take 1 tablet (100 mg total) by mouth daily      Facility-Administered Medications: None     Past Medical History:   Diagnosis Date    Dementia ([facility] [address] )     per family    Hypertension     IBS (irritable bowel syndrome)      History reviewed  No pertinent surgical history  History reviewed  No pertinent family history  I have reviewed and agree with the history as documented  E-Cigarette/Vaping    E-Cigarette Use Never User      E-Cigarette/Vaping Substances    Nicotine No     THC No     CBD No     Flavoring No     Other No     Unknown No      Social History     Tobacco Use    Smoking status: Former Smoker    Smokeless tobacco: Never Used   Substance Use Topics    Alcohol use: Yes     Alcohol/week: 21 0 standard drinks     Types: 21 Cans of beer per week     Frequency: Monthly or less     Drinks per session: 1 or 2     Comment: states 3 beers/night    Drug use: No     Review of Systems   Constitutional: Negative for chills, fatigue and fever  Eyes: Negative for visual disturbance  Respiratory: Negative for shortness of breath  Cardiovascular: Negative for chest pain and palpitations  Gastrointestinal: Positive for abdominal pain (mild)  Negative for abdominal distention, blood in stool, constipation, diarrhea, nausea and vomiting  Endocrine: Negative for polyuria  Genitourinary: Negative for dysuria and hematuria  Musculoskeletal: Negative for back pain  Neurological: Positive for weakness  Negative for dizziness, tremors, numbness (none recently, has had numbness of legs and buttocks intermittently in past) and headaches  Physical Exam  ED Triage Vitals [02/19/21 1033]   Temperature Pulse Respirations Blood Pressure SpO2   98 1 °F (36 7 °C) 85 18 145/77 97 %      Temp Source Heart Rate Source Patient Position - Orthostatic VS BP Location FiO2 (%)   Oral Monitor Lying Right arm --      Pain Score       8         Orthostatic Vital Signs  Vitals:    02/19/21 1033   BP: 145/77   Pulse: 85   Patient Position - Orthostatic VS: Lying     Physical Exam  Vitals signs reviewed  Constitutional:       General: She is not in acute distress  Appearance: Normal appearance  She is well-developed  She is not ill-appearing, toxic-appearing or diaphoretic  Comments: Elderly, frail   HENT:      Head: Normocephalic and atraumatic  Right Ear: External ear normal       Left Ear: External ear normal       Nose: Nose normal  No congestion or rhinorrhea  Mouth/Throat:      Mouth: Mucous membranes are moist       Pharynx: Oropharynx is clear  Eyes:      General: No scleral icterus  Extraocular Movements: Extraocular movements intact  Conjunctiva/sclera: Conjunctivae normal    Neck:      Trachea: No tracheal deviation  Cardiovascular:      Rate and Rhythm: Normal rate and regular rhythm  Heart sounds: Normal heart sounds  No murmur  No friction rub  No gallop  Pulmonary:      Effort: Pulmonary effort is normal  No respiratory distress  Breath sounds: Normal breath sounds  No stridor   No wheezing, rhonchi or rales  Abdominal:      General: Abdomen is flat  Bowel sounds are normal  There is no distension  Palpations: Abdomen is soft  Tenderness: There is abdominal tenderness (mild, diffuse)  There is no guarding or rebound  Musculoskeletal:      Right lower leg: No edema  Left lower leg: No edema  Skin:     General: Skin is warm and dry  Coloration: Skin is not jaundiced or pale  Neurological:      General: No focal deficit present  Mental Status: She is alert  Cranial Nerves: No cranial nerve deficit  Sensory: No sensory deficit  Motor: Weakness (BLE weakness appears mostly pain-limited ROM deficits w/o focal weakness per se) present  No abnormal muscle tone        Comments: Oriented to person, place, situation, but not year/date/month   Psychiatric:         Behavior: Behavior normal       Comments: Mildly anxious, somewhat resistant to instruction     ED Medications  Medications   fentanyl citrate (PF) 100 MCG/2ML 25 mcg (25 mcg Intravenous Given 2/19/21 1152)   morphine injection 2 mg (2 mg Intravenous Given 2/19/21 1243)     Diagnostic Studies  Results Reviewed     Procedure Component Value Units Date/Time    Basic metabolic panel [[id number]]  (Abnormal) Collected: 02/19/21 1135    Lab Status: Final result Specimen: Blood from Arm, Right Updated: 02/19/21 1153     Sodium 139 mmol/L      Potassium 4 6 mmol/L      Chloride 104 mmol/L      CO2 29 mmol/L      ANION GAP 6 mmol/L      BUN 20 mg/dL      Creatinine 1 02 mg/dL      Glucose 88 mg/dL      Calcium 8 2 mg/dL      eGFR 51 ml/min/1 73sq m     Narrative:      [place] guidelines for Chronic Kidney Disease (CKD):     Stage 1 with normal or high GFR (GFR > 90 mL/min/1 73 square meters)    Stage 2 Mild CKD (GFR = 60-89 mL/min/1 73 square meters)    Stage 3A Moderate CKD (GFR = 45-59 mL/min/1 73 square meters)    Stage 3B Moderate CKD (GFR = 30-44 mL/min/1 73 square meters)    Stage 4 Severe CKD (GFR = 15-29 mL/min/1 73 square meters)    Stage 5 End Stage CKD (GFR <15 mL/min/1 73 square meters)  Note: GFR calculation is accurate only with a steady state creatinine    CBC and differential [[id number]]  (Abnormal) Collected: 02/19/21 1135    Lab Status: Final result Specimen: Blood from Arm, Right Updated: 02/19/21 1142     WBC 9 77 Thousand/uL      RBC 3 70 Million/uL      Hemoglobin 10 3 g/dL      Hematocrit 33 4 %      MCV 90 fL      MCH 27 8 pg      MCHC 30 8 g/dL      RDW 17 1 %      MPV 8 4 fL      Platelets 114 Thousands/uL      nRBC 0 /100 WBCs      Neutrophils Relative 66 %      Immat GRANS % 0 %      Lymphocytes Relative 16 %      Monocytes Relative 14 %      Eosinophils Relative 3 %      Basophils Relative 1 %      Neutrophils Absolute 6 51 Thousands/µL      Immature Grans Absolute 0 03 Thousand/uL      Lymphocytes Absolute 1 58 Thousands/µL      Monocytes Absolute 1 35 Thousand/µL      Eosinophils Absolute 0 25 Thousand/µL      Basophils Absolute 0 05 Thousands/µL          XR lumbar spine 2 or 3 views   ED Interpretation by Corazon Yee DO (02/19 1159)   Wedge deformity of T12, appears worse than previous CT scan      Final Result by Kevin Kearns MD (02/19 1241)   Chronic compression deformities L1 and L2, unchanged      Diffuse osteopenia      Multilevel degenerative spondylosis, dextroscoliosis      Stable exam            Workstation performed: [id number]             Procedures  Procedures    ED Course  ED Course as of Feb 19 1340   Fri Feb 19, 2021   1147 Stable from recent   Hemoglobin(!): 10 3     MDM  Number of Diagnoses or Management Options  Ambulatory dysfunction: new and requires workup  Low back pain: established and worsening  Diagnosis management comments: This elderly patient w/ osteopenia and acute on chronic LBP is concerning for exacerbation of existing musculoskeletal pain or new compression fracture  No reported injury   Lower on the differential includes space occupying lesion such as abscess but given patient's lack of constitutional sx would be less likely  Patient denies any sx c/w cauda equina or other red flags  XR demonstrates possible progression of T12 wedge deformity  Patient is anemic to 10 5 but this appears to be chronic for her  No reported bloody stool or urine, no overt sources of bleeding  Back pain minimally improved with fentanyl and patient still unable to sit up or ambulate  Will need admission w/ likely CM consult       Amount and/or Complexity of Data Reviewed  Clinical lab tests: ordered and reviewed  Tests in the radiology section of CPT®: ordered and reviewed    Risk of Complications, Morbidity, and/or Mortality  Presenting problems: moderate  Diagnostic procedures: moderate  Management options: moderate    Patient Progress  Patient progress: improved    Disposition  Final diagnoses:   Low back pain   Ambulatory dysfunction     Time reflects when diagnosis was documented in both MDM as applicable and the Disposition within this note     Time User Action Codes Description Comment    2/19/2021  1:39 PM Thena Hole Add [M54 5] Low back pain     2/19/2021  1:39 PM Thena Hole Add [R26 2] Ambulatory dysfunction       ED Disposition     ED Disposition Condition Date/Time Comment    Admit Stable Fri Feb 19, 2021  1:39 PM Case was discussed with CUAUHTEMOC and the patient's admission status was agreed to be Admission Status: observation status to the service of Dr Wilfrid Paulino   Follow-up Information    None       Patient's Medications   Discharge Prescriptions    No medications on file     No discharge procedures on file  PDMP Review     None         ED Provider  Attending physically available and evaluated Adamaris Patel Queenie HUSSEIN managed the patient along with the ED Attending      Electronically Signed by         Lorraine Mroejon MD  02/19/21 8198

## 2021-02-19 NOTE — ASSESSMENT & PLAN NOTE
· Admitted in December 2020  · No current symptoms  Plan:  · Continue vitamins  · Continue Eliquis 2 5

## 2021-02-19 NOTE — ED NOTES
Pt unwilling/unable to  to fully sit up because of pain  Could not attempt to walk pt        Cori Seo RN  02/19/21 1479

## 2021-02-19 NOTE — ED NOTES
PT ambulatory to restroom w/ steady gait  PT ambulating at baseline and denies any abnormalities  PT requesting food now        Shannno Rodriguez  02/19/21 5242

## 2021-02-19 NOTE — H&P
H&P- Eladia Knox Stump 1940, 78 y o  female MRN: [id number]    Unit/Bed#: ED 18 Encounter: [id number]    Primary Care Provider: Linnea Goodell, DO   Date and time admitted to hospital: 2/19/2021 10:20 AM        * Acute on Chronic low back pain  Assessment & Plan  ·  No saddle anesthesia, bladder/bowel incontinence or sensory disturbance in lower extremities  ·  Prescribed prednisone on her last visit to her  PCP  Confirmed by daughter  History of osteoporosis  · X-ray lumbar spine: Chronic compression deformities L1 and L2, unchanged from previous imaging studies  Multilevel degenerative spondylosis  also noted  · Able to ambulate without assistance, S/p   Pain management with fentanyl and morphine    Plan:  · Continue pain management  · PT/OT  · Case management consulted for possible placement a [place]  · Fall precautions in place    Irritable bowel syndrome with diarrhea  Assessment & Plan   Continue home medications: Bentyl    Anemia of chronic disease  Assessment & Plan  · At baseline  · Hemodynamically stable    History of alcohol abuse  Assessment & Plan  Last alcohol consumption more than a month ago  No need for CIWA protocol    Depression with suicidal ideation  Assessment & Plan  · Recently discharged on February 1st   · No suicidal ideation  at this moment  · Continue home medications  Multiple falls  Assessment & Plan  · Noted in history  · Last admission in December  Plan:   · Fall precautions    COVID-19 virus infection  Assessment & Plan  · Admitted in December 2020  · No current symptoms  Plan:  · Continue vitamins  · Continue Eliquis 2 5        VTE Prophylaxis: Apixaban (Eliquis)  / sequential compression device   Code Status:  Level 1  POLST: POLST form is not discussed and not completed at this time  Anticipated Length of Stay:  Patient will be admitted on an Observation basis with an anticipated length of stay of   Less than 2 midnights     Justification for Hospital Stay: Acute on chronic low back pain  Chief Complaint:    Sudden onset low back pain    History of Present Illness:    Cm Field is a 78 y o  female who presents after experiencing sudden onset low back pain, rendering her unable to ambulate due to the pain  Patient has history of osteoporosis  Other problems include  Depression with suicidal ideation, anemia of chronic disease, alcohol intoxication  Recent COVID infection on December 2020  As per daughter her mother was prescribed a 6 day steroid taper  Patient has no saddle anesthesia, no bladder or bowel incontinence or any sensory disturbance does  Denies any history of heavy lifting ( also confirmed by daughter)  Hx frequent falls in the past     Electrolytes were unremarkable, CBC shows a low hemoglobin, however this appears to be at baseline  After receiving pain medication, patient was able to ambulate without assistance  Lumbar x-ray showing chronic compression deformities in L1 and L2, unchanged from previous imaging studies  Review of Systems:    Review of Systems   Constitutional: Negative  HENT: Negative  Eyes: Negative  Respiratory: Negative  Cardiovascular: Negative  Gastrointestinal: Positive for abdominal pain and constipation  Endocrine: Negative  Genitourinary: Negative  Musculoskeletal: Positive for back pain (lower)  Skin: Negative  Neurological: Positive for weakness  Hematological: Negative  Psychiatric/Behavioral: Negative for suicidal ideas  The patient is nervous/anxious  Past Medical and Surgical History:     Past Medical History:   Diagnosis Date    Dementia ([address] )     per family    Hypertension     IBS (irritable bowel syndrome)        History reviewed  No pertinent surgical history  Meds/Allergies:    Prior to Admission medications    Medication Sig Start Date End Date Taking?  Authorizing Provider   cholecalciferol (VITAMIN D3) 1,000 units tablet Take 2 tablets (2,000 Units total) by mouth daily 1/30/21  Yes MARC Calvo   donepezil (ARICEPT) 10 mg tablet Take 1 tablet (10 mg total) by mouth daily at bedtime 1/29/21  Yes MARC Calvo   folic acid (FOLVITE) 1 mg tablet Take 1 tablet (1 mg total) by mouth daily 1/30/21  Yes MARC Calvo   mirtazapine (REMERON) 45 MG tablet Take 1 tablet (45 mg total) by mouth daily at bedtime 1/29/21  Yes MARC Calvo   Multiple Vitamin (MULTIVITAMIN) tablet Take 1 tablet by mouth daily   Yes Historical Provider, MD   omeprazole (PriLOSEC) 10 mg delayed release capsule Take 10 mg by mouth daily   Yes Historical Provider, MD   thiamine 100 MG tablet Take 1 tablet (100 mg total) by mouth daily 1/30/21  Yes MARC Calvo   apixaban (ELIQUIS) 2 5 mg Take 1 tablet (2 5 mg total) by mouth 2 (two) times a day 1/29/21   MARC Calvo   dicyclomine (BENTYL) 20 mg tablet Take 1 tablet (20 mg total) by mouth 4 (four) times a day (before meals and at bedtime)  Patient not taking: Reported on 2/19/2021 1/29/21   MARC Calvo   loperamide (IMODIUM) 2 mg capsule Take 1 capsule (2 mg total) by mouth 3 (three) times a day as needed for diarrhea  Patient not taking: Reported on 2/19/2021 12/14/20   Lorena Berrios MD     I have reviewed home medications with patient family member  Allergies:    Allergies   Allergen Reactions    Penicillins      Does not know reaction       Social History:     Marital Status: /Civil Union   Occupation: Retired  Patient Pre-hospital Living Situation: Lives w daughter  Patient Pre-hospital Level of Mobility: W/o assistance  Patient Pre-hospital Diet Restrictions: None  Substance Use History:   Social History     Substance and Sexual Activity   Alcohol Use Yes    Alcohol/week: 21 0 standard drinks    Types: 21 Cans of beer per week    Frequency: Monthly or less    Drinks per session: 1 or 2    Comment: states 3 beers/night     Social History     Tobacco Use   Smoking Status Former Smoker   Smokeless Tobacco Never Used     Social History     Substance and Sexual Activity   Drug Use No       Family History:    History reviewed  No pertinent family history  Physical Exam:     Vitals:   Blood Pressure: 145/77 (02/19/21 1033)  Pulse: 85 (02/19/21 1033)  Temperature: 98 1 °F (36 7 °C) (02/19/21 1033)  Temp Source: Oral (02/19/21 1033)  Respirations: 18 (02/19/21 1033)  SpO2: 97 % (02/19/21 1033)    Physical Exam  Constitutional:       General: She is awake  She is not in acute distress  Appearance: She is underweight  HENT:      Head: Normocephalic and atraumatic  Nose: Nose normal       Mouth/Throat:      Mouth: Mucous membranes are moist    Eyes:      Extraocular Movements: Extraocular movements intact  Cardiovascular:      Rate and Rhythm: Normal rate and regular rhythm  Pulses: Normal pulses  Heart sounds: Normal heart sounds  No murmur  No friction rub  Pulmonary:      Effort: Pulmonary effort is normal  No respiratory distress  Breath sounds: Normal breath sounds  No stridor  No wheezing, rhonchi or rales  Chest:      Chest wall: No tenderness  Abdominal:      General: Bowel sounds are normal  There is no distension  Palpations: Abdomen is soft  Tenderness: There is no abdominal tenderness  There is no right CVA tenderness, left CVA tenderness, guarding or rebound  Musculoskeletal:         General: No swelling, tenderness or deformity  Lumbar back: She exhibits decreased range of motion  Right lower leg: No edema  Left lower leg: No edema  Skin:     General: Skin is warm  Coloration: Skin is not jaundiced  Findings: No erythema, lesion or rash  Neurological:      General: No focal deficit present  Mental Status: She is alert and oriented to person, place, and time  Motor: Weakness (  Bilateral lower extremities) present  Psychiatric:         Mood and Affect: Mood is anxious           Behavior: Behavior normal  Behavior is cooperative  Cognition and Memory: Cognition is impaired  Memory is impaired  Additional Data:     Lab Results: I have personally reviewed pertinent reports  Results from last 7 days   Lab Units 02/19/21  1135   WBC Thousand/uL 9 77   HEMOGLOBIN g/dL 10 3*   HEMATOCRIT % 33 4*   PLATELETS Thousands/uL 321   NEUTROS PCT % 66   LYMPHS PCT % 16   MONOS PCT % 14*   EOS PCT % 3     Results from last 7 days   Lab Units 02/19/21  1135   POTASSIUM mmol/L 4 6   CHLORIDE mmol/L 104   CO2 mmol/L 29   BUN mg/dL 20   CREATININE mg/dL 1 02   CALCIUM mg/dL 8 2*           Imaging: I have personally reviewed pertinent reports  Xr Lumbar Spine 2 Or 3 Views    Result Date: 2/19/2021  Narrative: LUMBAR SPINE INDICATION:   Low back pain  COMPARISON:  5/30/2020 VIEWS:  XR SPINE LUMBAR 2 OR 3 VIEWS INJURY Images: 3 FINDINGS: Diffuse osteopenia There are 5 non rib bearing lumbar vertebral bodies  Mild to moderate superior endplate wedge compression deformity L1, unchanged  Mild superior endplate compression deformity L2, unchanged  Mild dextroscoliosis, apex L2-3, unchanged Multilevel degenerative spondylosis The pedicles appear intact  Soft tissues are unremarkable  Impression: Chronic compression deformities L1 and L2, unchanged Diffuse osteopenia Multilevel degenerative spondylosis, dextroscoliosis Stable exam Workstation performed: [id number]       EKG, Pathology, and Other Studies Reviewed on Admission:   · EKG: No new EKG    Epic / Care Everywhere Records Reviewed: Yes     ** Please Note: This note has been constructed using a voice recognition system   **

## 2021-02-19 NOTE — ASSESSMENT & PLAN NOTE
· Recently discharged on February 1st   · No suicidal ideation  at this moment  · Continue home medications

## 2021-02-19 NOTE — ED NOTES
Pt declines to order food  Does not want me to order something for later        Darian Kat, TACOS  02/19/21 4539

## 2021-02-20 PROBLEM — E16.2 HYPOGLYCEMIA: Status: ACTIVE | Noted: 2021-02-20

## 2021-02-20 LAB
ALBUMIN SERPL BCP-MCNC: 2.7 G/DL (ref 3.5–5)
ALP SERPL-CCNC: 88 U/L (ref 46–116)
ALT SERPL W P-5'-P-CCNC: 22 U/L (ref 12–78)
ANION GAP SERPL CALCULATED.3IONS-SCNC: 13 MMOL/L (ref 4–13)
AST SERPL W P-5'-P-CCNC: 21 U/L (ref 5–45)
BILIRUB SERPL-MCNC: 0.55 MG/DL (ref 0.2–1)
BUN SERPL-MCNC: 33 MG/DL (ref 5–25)
CALCIUM ALBUM COR SERPL-MCNC: 9.3 MG/DL (ref 8.3–10.1)
CALCIUM SERPL-MCNC: 8.3 MG/DL (ref 8.3–10.1)
CHLORIDE SERPL-SCNC: 104 MMOL/L (ref 100–108)
CO2 SERPL-SCNC: 22 MMOL/L (ref 21–32)
CREAT SERPL-MCNC: 1 MG/DL (ref 0.6–1.3)
ERYTHROCYTE [DISTWIDTH] IN BLOOD BY AUTOMATED COUNT: 16.9 % (ref 11.6–15.1)
GFR SERPL CREATININE-BSD FRML MDRD: 53 ML/MIN/1.73SQ M
GLUCOSE P FAST SERPL-MCNC: 46 MG/DL (ref 65–99)
GLUCOSE SERPL-MCNC: 110 MG/DL (ref 65–140)
GLUCOSE SERPL-MCNC: 46 MG/DL (ref 65–140)
HCT VFR BLD AUTO: 32.8 % (ref 34.8–46.1)
HGB BLD-MCNC: 10.2 G/DL (ref 11.5–15.4)
MCH RBC QN AUTO: 28.7 PG (ref 26.8–34.3)
MCHC RBC AUTO-ENTMCNC: 31.1 G/DL (ref 31.4–37.4)
MCV RBC AUTO: 92 FL (ref 82–98)
PLATELET # BLD AUTO: 312 THOUSANDS/UL (ref 149–390)
PMV BLD AUTO: 8.7 FL (ref 8.9–12.7)
POTASSIUM SERPL-SCNC: 4.6 MMOL/L (ref 3.5–5.3)
PROT SERPL-MCNC: 6.8 G/DL (ref 6.4–8.2)
RBC # BLD AUTO: 3.55 MILLION/UL (ref 3.81–5.12)
SODIUM SERPL-SCNC: 139 MMOL/L (ref 136–145)
WBC # BLD AUTO: 7.35 THOUSAND/UL (ref 4.31–10.16)

## 2021-02-20 PROCEDURE — 97167 OT EVAL HIGH COMPLEX 60 MIN: CPT

## 2021-02-20 PROCEDURE — 85027 COMPLETE CBC AUTOMATED: CPT | Performed by: INTERNAL MEDICINE

## 2021-02-20 PROCEDURE — 80053 COMPREHEN METABOLIC PANEL: CPT | Performed by: INTERNAL MEDICINE

## 2021-02-20 PROCEDURE — 82948 REAGENT STRIP/BLOOD GLUCOSE: CPT

## 2021-02-20 PROCEDURE — 99232 SBSQ HOSP IP/OBS MODERATE 35: CPT | Performed by: INTERNAL MEDICINE

## 2021-02-20 PROCEDURE — 97163 PT EVAL HIGH COMPLEX 45 MIN: CPT

## 2021-02-20 RX ORDER — LANOLIN ALCOHOL/MO/W.PET/CERES
6 CREAM (GRAM) TOPICAL
Status: DISCONTINUED | OUTPATIENT
Start: 2021-02-20 | End: 2021-02-23 | Stop reason: HOSPADM

## 2021-02-20 RX ADMIN — ENOXAPARIN SODIUM 40 MG: 40 INJECTION SUBCUTANEOUS at 08:38

## 2021-02-20 RX ADMIN — DOCUSATE SODIUM 100 MG: 100 CAPSULE, LIQUID FILLED ORAL at 08:37

## 2021-02-20 RX ADMIN — DONEPEZIL HYDROCHLORIDE 10 MG: 5 TABLET, FILM COATED ORAL at 21:26

## 2021-02-20 RX ADMIN — MIRTAZAPINE 45 MG: 15 TABLET, FILM COATED ORAL at 21:26

## 2021-02-20 RX ADMIN — Medication 1 TABLET: at 08:37

## 2021-02-20 RX ADMIN — DICYCLOMINE HYDROCHLORIDE 20 MG: 20 TABLET ORAL at 17:10

## 2021-02-20 RX ADMIN — DICYCLOMINE HYDROCHLORIDE 20 MG: 20 TABLET ORAL at 07:25

## 2021-02-20 RX ADMIN — ACETAMINOPHEN 975 MG: 325 TABLET, FILM COATED ORAL at 13:34

## 2021-02-20 RX ADMIN — ACETAMINOPHEN 975 MG: 325 TABLET, FILM COATED ORAL at 05:32

## 2021-02-20 RX ADMIN — DICYCLOMINE HYDROCHLORIDE 20 MG: 20 TABLET ORAL at 21:26

## 2021-02-20 RX ADMIN — PANTOPRAZOLE SODIUM 20 MG: 20 TABLET, DELAYED RELEASE ORAL at 05:32

## 2021-02-20 RX ADMIN — Medication 2000 UNITS: at 08:36

## 2021-02-20 RX ADMIN — FOLIC ACID 1 MG: 1 TABLET ORAL at 08:36

## 2021-02-20 RX ADMIN — THIAMINE HCL TAB 100 MG 100 MG: 100 TAB at 08:36

## 2021-02-20 RX ADMIN — DICYCLOMINE HYDROCHLORIDE 20 MG: 20 TABLET ORAL at 12:31

## 2021-02-20 RX ADMIN — DICLOFENAC SODIUM 2 G: 10 GEL TOPICAL at 08:38

## 2021-02-20 RX ADMIN — Medication 6 MG: at 23:33

## 2021-02-20 NOTE — OCCUPATIONAL THERAPY NOTE
Occupational Therapy Evaluation     Patient Name: Logan Gallo  Today's Date: 2/20/2021  Problem List  Principal Problem:    Acute on Chronic low back pain  Active Problems:    COVID-19 virus infection    Multiple falls    Depression with suicidal ideation    History of alcohol abuse    Anemia of chronic disease    Irritable bowel syndrome with diarrhea    Hypoglycemia    Past Medical History  Past Medical History:   Diagnosis Date    Dementia ([address] )     per family    Hypertension     IBS (irritable bowel syndrome)      Past Surgical History  History reviewed  No pertinent surgical history  02/20/21 1140   OT Last Visit   OT Visit Date 02/20/21   Note Type   Note type Evaluation   Restrictions/Precautions   Weight Bearing Precautions Per Order No   Other Precautions Cognitive; Chair Alarm; Bed Alarm; Fall Risk   Pain Assessment   Pain Assessment Tool Pain Assessment not indicated - pt denies pain   Pain Score No Pain   Home Living   Type of Home House   Home Layout Two level   Bathroom Shower/Tub Tub/shower unit   Bathroom Toilet Standard   Bathroom Equipment Grab bars in [facility]   (reports no use of AD)   Additional Comments "I'm not sure I'm sorry"   Prior Function   Lives With Daughter   ADL Assistance Needs assistance  (A with showers)   IADLs Needs assistance   Falls in the last 6 months   (Reports I don't remember, per chart "multiple")   Vocational Retired   Comments (-)drives, reports her children drive   Lifestyle   Autonomy PTA pt living with daughter in [facility], pt is an unreliable historian, however states (A) with ADLs and IADLs, no use of AD at baseline, (+)falls, (-)drives   Reciprocal Relationships 4 children   Service to Others retired, caring for children   [address] likes to watch games shows, espically [employer] a [employer]   ADL   Eating Assistance 5  Supervision/Setup   Grooming Assistance 5  Supervision/Setup   Grooming Deficit Standing with assistive device; Increased time to complete;Supervision/safety;Verbal cueing  (Max VC for locating materials)   UB Bathing Assistance 5  Supervision/Setup   LB Bathing Assistance 2  Maximal [address] 5  Supervision/Setup    [address] 2  Maximal Assistance   LB Dressing Deficit Increased time to complete;Supervision/safety;Verbal cueing; Don/doff L sock; Don/doff R sock; Don/doff R shoe;Don/doff L shoe   Toileting Assistance  4  Minimal Assistance   Toileting Deficit Increased time to complete;Supervison/safety;Verbal cueing;Clothing management down   Bed Mobility   Supine to Sit 5  Supervision   Additional items Increased time required;Verbal cues   Sit to Supine 5  Supervision   Additional items Increased time required;Verbal cues   Transfers   Sit to Stand 5  Supervision   Additional items Increased time required;Verbal cues   Stand to Sit 5  Supervision   Additional items Increased time required;Verbal cues   Toilet transfer 5  Supervision   Additional items Increased time required;Verbal cues;Standard toilet   Additional Comments no AD   Functional Mobility   Functional Mobility 4  Minimal assistance  (CGA)   Additional Comments functional household distance   Additional items Rolling walker  (with and without RW)   Balance   Static Sitting Good   Dynamic Sitting Fair +   Static Standing Fair -   Dynamic Standing Fair -   Ambulatory Poor +   Activity Tolerance   Activity Tolerance Patient tolerated treatment well   Medical Staff Made Aware PT Kd Rodriguez, RN [place]   RUE Assessment   RUE Assessment WFL   LUE Assessment   LUE Assessment WFL   Hand Function   Gross Motor Coordination Functional   Fine Motor Coordination Functional   Sensation   Light Touch No apparent deficits   Sharp/Dull No apparent deficits   Cognition   Overall Cognitive Status Impaired   Arousal/Participation Alert; Cooperative   Attention Attends with cues to redirect   Orientation Level Oriented to person;Oriented to place;Oriented to time;Disoriented to situation   Memory Decreased recall of precautions;Decreased recall of recent events;Decreased short term memory   Following Commands Follows one step commands with increased time or repetition   Comments Pt with decreased insights into deficits, decreased problem solving, decreased safety awareness   Assessment   Limitation Decreased ADL status; Decreased UE strength;Decreased Safe judgement during ADL;Decreased endurance;Decreased self-care trans;Decreased high-level ADLs; Decreased cognition   Prognosis Good   Assessment Patient is a 78 y o  female admitted to [facility] on 2/19/2021 due to Chronic low back pain  Comorbidities affecting pt's physical performance at time of assessment include hx COVID, multiple falls, depression with suicidial ideation, hx alcohol abuse, IBS  Patient has active OT orders  PTA pt living with daughter in [facility], pt is an unreliable historian, however states (A) with ADLs and IADLs, no use of AD at baseline, (+)falls, (-)drives  Personal factors affecting pt at time of IE include:steps to enter environment, limited home support, difficulty performing ADLS, difficulty performing IADLS , limited insight into deficits, compliance and decreased initiation and engagement   At the time of evaluation patient currently requires (S) for UB ADLs, max A for LB ADLs, (S) for functional transfers, and CGA for functional mobility  The following deficits affected patient's occupational performance weakness, decreased functional strength, decreased functional balance, decreased activity tolerance, decreased safety awareness, impaired attention, impaired memory, impaired sequencing, impaired problem solving, impaired interpersonal skills and decreased coping skills  Patient would benefit from skilled OT services while in the hospital to address above deficits   Occupational performance areas to be addressed include ADL retraining, bed mobility, functional transfer training, endurance training, cognitive reorientation, patient/family training, equipment evaluation/education, compensatory technique education, activity engagement and activity tolerance in order to maximize patient's level of function  The patient's raw score on the AM-PAC Daily Activity inpatient short form is 17, standardized score is 37 26, less than 39 4  Patients at this level are likely to benefit from DC to post-acute rehabilitation services  From OT standpoint recommend Post acute rehab vs 24/7 (S) and (A) upon D/C  Will continue to follow pt 2-3x/week to address the goals listed below evaluation  Goals   Patient Goals to get out of here   LTG Time Frame 10-14   Long Term Goal see goals listed below   Plan   Treatment Interventions ADL retraining;Functional transfer training;UE strengthening/ROM; Endurance training;Patient/family training;Equipment evaluation/education;Cognitive reorientation; Compensatory technique education; Energy conservation; Activityengagement   Goal Expiration Date 03/06/21   OT Treatment Day 0   OT Frequency 2-3x/wk   Recommendation   OT Discharge Recommendation Other (Comment)  (rehab vs 24/7 (S) and (A))   AM-PAC Daily Activity Inpatient   Lower Body Dressing 2   Bathing 3   Toileting 3   Upper Body Dressing 3   Grooming 3   Eating 3   Daily Activity Raw Score 17   Daily Activity Standardized Score (Calc for Raw Score >=11) 37 26   AM-PAC Applied Cognition Inpatient   Following a Speech/Presentation 1   Understanding Ordinary Conversation 1   Taking Medications 1   Remembering Where Things Are Placed or Put Away 2   Remembering List of 4-5 Errands 2   Taking Care of Complicated Tasks 2   Applied Cognition Raw Score 9   Applied Cognition Standardized Score 22 48          Goals    -Patient will complete UB ADLs w/ mod I using AE and AD as needed    -Patient will complete LB ADLs w/ mod I using AE and AD as needed    -Patient will complete toileting w/ mod I w/ G hygiene/thoroughness    -Patient will complete bed mobility with Mod I without use of bed rails    -Patient will tolerate therapeutic activities for greater than 15 min, in order to increase tolerance for functional activities      -Patient will perform functional transfers with Mod I to/from all surfaces using DME as needed    -Patient will complete functional mobility during ADL/IADL/leisure tasks with Mod I     -Patient will be attentive 100% of the time during ongoing cognitive assessment w/ G participation to assist w/ safe d/c planning/recommendations     -Patient will verbalize 3 potential fall hazards and identify appropriate compensatory techniques to decrease fall risk in home environment     -Patient will follow at least 80% of simple one step commands in order to complete a functional activity     -Patient will participate in simulated IADL management task to increase independence to Mod I w/ G safety and endurance        At the end of the session, all needs met and pt seated in bedside chair, chair alarm activated and call bell within reach    [facility], OTR/L

## 2021-02-20 NOTE — PLAN OF CARE
Problem: Potential for Falls  Goal: Patient will remain free of falls  Description: INTERVENTIONS:  - Assess patient frequently for physical needs  -  Identify cognitive and physical deficits and behaviors that affect risk of falls    -  [place] fall precautions as indicated by assessment   - Educate patient/family on patient safety including physical limitations  - Instruct patient to call for assistance with activity based on assessment  - Modify environment to reduce risk of injury  - Consider OT/PT consult to assist with strengthening/mobility  Outcome: Progressing     Problem: Prexisting or High Potential for Compromised Skin Integrity  Goal: Skin integrity is maintained or improved  Description: INTERVENTIONS:  - Identify patients at risk for skin breakdown  - Assess and monitor skin integrity  - Assess and monitor nutrition and hydration status  - Monitor labs   - Assess for incontinence   - Turn and reposition patient  - Assist with mobility/ambulation  - Relieve pressure over bony prominences  - Avoid friction and shearing  - Provide appropriate hygiene as needed including keeping skin clean and dry  - Evaluate need for skin moisturizer/barrier cream  - Collaborate with interdisciplinary team   - Patient/family teaching  - Consider wound care consult   Outcome: Progressing     Problem: PAIN - ADULT  Goal: Verbalizes/displays adequate comfort level or baseline comfort level  Description: Interventions:  - Encourage patient to monitor pain and request assistance  - Assess pain using appropriate pain scale  - Administer analgesics based on type and severity of pain and evaluate response  - Implement non-pharmacological measures as appropriate and evaluate response  - Consider cultural and social influences on pain and pain management  - Notify physician/advanced practitioner if interventions unsuccessful or patient reports new pain  Outcome: Progressing     Problem: INFECTION - ADULT  Goal: Absence or prevention of progression during hospitalization  Description: INTERVENTIONS:  - Assess and monitor for signs and symptoms of infection  - Monitor lab/diagnostic results  - Monitor all insertion sites, i e  indwelling lines, tubes, and drains  - Monitor endotracheal if appropriate and nasal secretions for changes in amount and color  - [place] appropriate cooling/warming therapies per order  - Administer medications as ordered  - Instruct and encourage patient and family to use good hand hygiene technique  - Identify and instruct in appropriate isolation precautions for identified infection/condition  Outcome: Progressing  Goal: Absence of fever/infection during neutropenic period  Description: INTERVENTIONS:  - Monitor WBC    Outcome: Progressing     Problem: SAFETY ADULT  Goal: Patient will remain free of falls  Description: INTERVENTIONS:  - Assess patient frequently for physical needs  -  Identify cognitive and physical deficits and behaviors that affect risk of falls    -  [place] fall precautions as indicated by assessment   - Educate patient/family on patient safety including physical limitations  - Instruct patient to call for assistance with activity based on assessment  - Modify environment to reduce risk of injury  - Consider OT/PT consult to assist with strengthening/mobility  Outcome: Progressing  Goal: Maintain or return to baseline ADL function  Description: INTERVENTIONS:  -  Assess patient's ability to carry out ADLs; assess patient's baseline for ADL function and identify physical deficits which impact ability to perform ADLs (bathing, care of mouth/teeth, toileting, grooming, dressing, etc )  - Assess/evaluate cause of self-care deficits   - Assess range of motion  - Assess patient's mobility; develop plan if impaired  - Assess patient's need for assistive devices and provide as appropriate  - Encourage maximum independence but intervene and supervise when necessary  - Involve family in performance of ADLs  - Assess for home care needs following discharge   - Consider OT consult to assist with ADL evaluation and planning for discharge  - Provide patient education as appropriate  Outcome: Progressing  Goal: Maintain or return mobility status to optimal level  Description: INTERVENTIONS:  - Assess patient's baseline mobility status (ambulation, transfers, stairs, etc )    - Identify cognitive and physical deficits and behaviors that affect mobility  - Identify mobility aids required to assist with transfers and/or ambulation (gait belt, sit-to-stand, lift, walker, cane, etc )  - [place] fall precautions as indicated by assessment  - Record patient progress and toleration of activity level on Mobility SBAR; progress patient to next Phase/Stage  - Instruct patient to call for assistance with activity based on assessment  - Consider rehabilitation consult to assist with strengthening/weightbearing, etc   Outcome: Progressing     Problem: DISCHARGE PLANNING  Goal: Discharge to home or other facility with appropriate resources  Description: INTERVENTIONS:  - Identify barriers to discharge w/patient and caregiver  - Arrange for needed discharge resources and transportation as appropriate  - Identify discharge learning needs (meds, wound care, etc )  - Arrange for interpretive services to assist at discharge as needed  - Refer to Case Management Department for coordinating discharge planning if the patient needs post-hospital services based on physician/advanced practitioner order or complex needs related to functional status, cognitive ability, or social support system  Outcome: Progressing     Problem: Knowledge Deficit  Goal: Patient/family/caregiver demonstrates understanding of disease process, treatment plan, medications, and discharge instructions  Description: Complete learning assessment and assess knowledge base    Interventions:  - Provide teaching at level of understanding  - Provide teaching via preferred learning methods  Outcome: Progressing

## 2021-02-20 NOTE — PROGRESS NOTES
Progress Note - Kellen Caceres Stump 1940, 78 y o  female MRN: [id number]    Unit/Bed#: W -01 Encounter: [id number]    Primary Care Provider: Janice Estrada DO   Date and time admitted to hospital: 2/19/2021 10:20 AM        * Acute on Chronic low back pain  Assessment & Plan  · No saddle anesthesia, bladder/bowel incontinence or sensory disturbance in lower extremities  · Prescribed prednisone on her last visit to her  PCP  Confirmed by daughter  History of osteoporosis  · X-ray lumbar spine: Chronic compression deformities L1 and L2, unchanged from previous imaging studies  Multilevel degenerative spondylosis  also noted  · Able to ambulate without pain    Plan:  · Continue pain management  · PT/OT:  Post acute rehab recommended  · Case management consulted for possible placement a [place]  · Fall precautions in place    Hypoglycemia  Assessment & Plan  Patient had a hypoglycemic event in a m  Received food, which increased glucose levels to normal range    Irritable bowel syndrome with diarrhea  Assessment & Plan   Continue home medications: Bentyl    Anemia of chronic disease  Assessment & Plan  · At baseline  · Hemodynamically stable    History of alcohol abuse  Assessment & Plan  Last alcohol consumption more than a month ago  No need for CIWA protocol    Depression with suicidal ideation  Assessment & Plan  · Recently discharged on February 1st   · No suicidal ideation  at this moment  · Continue home medications  Multiple falls  Assessment & Plan  · Noted in history  · Last admission in December      Plan:   · Fall precautions    COVID-19 virus infection  Assessment & Plan  · Admitted in December 2020  · No current symptoms  Plan:  · Continue vitamins  · Continue Eliquis 2 5        VTE Pharmacologic Prophylaxis:   Pharmacologic: Enoxaparin (Lovenox)  Mechanical VTE Prophylaxis in Place: Yes    Discussions with Specialists or Other Care Team Provider: IM attending    Education and Discussions with Family / Patient:  Discussed with patient at bedside  Updated daughter by phone  Current Length of Stay: 0 day(s)    Current Patient Status: Observation     Discharge Plan / Estimated Discharge Date:  Anticipate in the next 24-48 hours    Code Status: Level 1 - Full Code      Subjective:   Patient was alert and awake, lying in bed  Endorses no further complaints  No longer has back pain  Objective:     Vitals:   Temp (24hrs), Av 3 °F (36 3 °C), Min:97 2 °F (36 2 °C), Max:97 4 °F (36 3 °C)    Temp:  [97 2 °F (36 2 °C)-97 4 °F (36 3 °C)] 97 2 °F (36 2 °C)  HR:  [68-91] 68  Resp:  [16-18] 16  BP: ()/(60-80) 130/68  SpO2:  [96 %-99 %] 99 %  Body mass index is 19 69 kg/m²  Input and Output Summary (last 24 hours): Intake/Output Summary (Last 24 hours) at 2021 1550  Last data filed at 2021 1401  Gross per 24 hour   Intake 300 ml   Output --   Net 300 ml       Physical Exam:     Physical Exam  Constitutional:       General: She is not in acute distress  HENT:      Head: Normocephalic and atraumatic  Nose: Nose normal       Mouth/Throat:      Mouth: Mucous membranes are moist    Eyes:      Extraocular Movements: Extraocular movements intact  Cardiovascular:      Rate and Rhythm: Normal rate and regular rhythm  Pulses: Normal pulses  Heart sounds: Normal heart sounds  No murmur  No friction rub  Pulmonary:      Effort: Pulmonary effort is normal  No respiratory distress  Breath sounds: Normal breath sounds  No stridor  No wheezing, rhonchi or rales  Chest:      Chest wall: No tenderness  Abdominal:      General: Bowel sounds are normal  There is no distension  Palpations: Abdomen is soft  Tenderness: There is no abdominal tenderness  There is no right CVA tenderness, left CVA tenderness, guarding or rebound  Musculoskeletal: Normal range of motion  General: No swelling, tenderness or deformity        Right lower leg: No edema  Left lower leg: No edema  Skin:     General: Skin is warm  Coloration: Skin is not jaundiced  Findings: No erythema, lesion or rash  Neurological:      General: No focal deficit present  Mental Status: She is alert and oriented to person, place, and time  Motor: Weakness (Bilaterally lower extremities) present  Psychiatric:         Mood and Affect: Mood normal          Behavior: Behavior normal          Cognition and Memory: Cognition is impaired  Memory is impaired  Additional Data:     Labs:    Results from last 7 days   Lab Units 02/20/21  0551 02/19/21  1135   WBC Thousand/uL 7 35 9 77   HEMOGLOBIN g/dL 10 2* 10 3*   HEMATOCRIT % 32 8* 33 4*   PLATELETS Thousands/uL 312 321   NEUTROS PCT %  --  66   LYMPHS PCT %  --  16   MONOS PCT %  --  14*   EOS PCT %  --  3     Results from last 7 days   Lab Units 02/20/21  0551   POTASSIUM mmol/L 4 6   CHLORIDE mmol/L 104   CO2 mmol/L 22   BUN mg/dL 33*   CREATININE mg/dL 1 00   CALCIUM mg/dL 8 3   ALK PHOS U/L 88   ALT U/L 22   AST U/L 21           * I Have Reviewed All Lab Data Listed Above  * Additional Pertinent Lab Tests Reviewed:  [place] [address] Admission Reviewed    Imaging:    Imaging Reports Reviewed Today Include:  None  Imaging Personally Reviewed by Myself Includes:  None    Recent Cultures (last 7 days):           Last 24 Hours Medication List:   Current Facility-Administered Medications   Medication Dose Route Frequency Provider Last Rate    acetaminophen  975 mg Oral [facility] Brett Oliver MD      cholecalciferol  2,000 Units Oral Daily Brett Oliver MD      Diclofenac Sodium  2 g Topical 4x Daily Brett Oliver MD      dicyclomine  20 mg Oral 4x Daily [facility]) Brett Oliver MD      docusate sodium  100 mg Oral BID Brett Oliver MD      donepezil  10 mg Oral HS Brett Oliver MD      enoxaparin  40 mg Subcutaneous Q24H [facility], MD      folic acid  1 mg Oral Daily Danielle Sorto MD      HYDROmorphone  0 2 mg Intravenous Q4H PRN Danielle Sorto MD      mirtazapine  45 mg Oral HS Danielle Sorto MD      multivitamin-minerals  1 tablet Oral Daily Danielle Sorto MD      naloxone  0 04 mg Intravenous Q1MIN PRN Danielle Sorto MD      ondansetron  4 mg Intravenous Q4H PRN Danielle Sorto MD      oxyCODONE  2 5 mg Oral Q4H PRN Danielle Sorto MD      oxyCODONE  5 mg Oral Q4H PRN Danielle Sorto MD      pantoprazole  20 mg Oral Early Morning Danielle Sorto MD      thiamine  100 mg Oral Daily Danielle Sorto MD          Today, Patient Was Seen By: Danielle Sorto MD    ** Please Note: This note has been constructed using a voice recognition system   **

## 2021-02-20 NOTE — ASSESSMENT & PLAN NOTE
Patient had a hypoglycemic event in a m    Received food, which increased glucose levels to normal range

## 2021-02-20 NOTE — PHYSICIAN ADVISOR
Current patient class: Observation  The patient is currently on Hospital Day: 2 at [facility]      The patient was admitted to the hospital at N/A on N/A for the following diagnosis:  Low back pain [M54 5]  Back pain [M54 9]  Ambulatory dysfunction [R26 2]       There is documentation in the medical record of an expected length of stay of at least 2 midnights  The patient is therefore expected to satisfy the 2 midnight benchmark and given the 2 midnight presumption is appropriate for INPATIENT ADMISSION  Given this expectation of a satisfying stay, CMS instructs us that the patient is most often appropriate for inpatient admission under part A provided medical necessity is documented in the chart  After review of the relevant documentation, labs, vital signs and test results, the patient is appropriate for INPATIENT ADMISSION  Admission to the hospital as an inpatient is a complex decision making process which requires the practitioner to consider the patients presenting complaint, history and physical examination and all relevant testing  With this in mind, in this case, the patient was deemed appropriate for INPATIENT ADMISSION  After review of the documentation and testing available at the time of the admission I concur with this clinical determination of medical necessity  Rationale is as follows: The patient is a 78 yrs old Female who presented to the ED at 2/19/2021 10:20 AM with a chief complaint of Back Pain (pt reports backpain since last night, unable to ambulate or lay down per pt  Per EMS pt was refusing to ambulate d/t pain  able to ambulate with assistance at residence  )     The patient was admitted with acute on chronic low back pain, IBS with diarrhea, multiple falls and prior COVID-19 infection in December  The plan of care includes pain control, PT/OT and case management consultations, Bentyl, Eliquis and vitamins    On the following day the patient had a hypoglycemic event in the morning which normalized with eating  The patient has received Diclofenac gel, IV Fentanyl, IV Morphine for pain  This patient is not stable for home discharge and her length of stay is expected to be more than 2 midnights  This patient is appropriate for INPATIENT admission  The patients vitals on arrival were   ED Triage Vitals [02/19/21 1033]   Temperature Pulse Respirations Blood Pressure SpO2   98 1 °F (36 7 °C) 85 18 145/77 97 %      Temp Source Heart Rate Source Patient Position - Orthostatic VS BP Location FiO2 (%)   Oral Monitor Lying Right arm --      Pain Score       8           Past Medical History:   Diagnosis Date    Dementia ([address] )     per family    Hypertension     IBS (irritable bowel syndrome)      History reviewed  No pertinent surgical history          Consults have been placed to:   IP CONSULT TO CASE MANAGEMENT    Vitals:    02/19/21 2040 02/19/21 2100 02/20/21 0729 02/20/21 1522   BP: 129/74 151/80 95/60 130/68   BP Location:  Left arm     Pulse: 82 91 81 68   Resp: 16 18  16   Temp:  (!) 97 3 °F (36 3 °C) (!) 97 4 °F (36 3 °C) (!) 97 2 °F (36 2 °C)   TempSrc:  Oral     SpO2: 98% 96% 96% 99%   Weight:       Height:           Most recent labs:    Recent Labs     02/20/21  0551   WBC 7 35   HGB 10 2*   HCT 32 8*      K 4 6   CALCIUM 8 3   BUN 33*   CREATININE 1 00   AST 21   ALT 22   ALKPHOS 88       Scheduled Meds:  Current Facility-Administered Medications   Medication Dose Route Frequency Provider Last Rate    acetaminophen  975 mg Oral [facility] Jonathan Palomino MD      cholecalciferol  2,000 Units Oral Daily Jonathan Palomino MD      Diclofenac Sodium  2 g Topical 4x Daily Jonathan Palomino MD      dicyclomine  20 mg Oral 4x Daily [facility] Jonathan Palomino MD      docusate sodium  100 mg Oral BID Jonathan Palomino MD      donepezil  10 mg Oral HS Jonathan Palomino MD      enoxaparin  40 mg Subcutaneous Q24H [facility] Senia Camp MD      folic acid  1 mg Oral Daily Riya Brandon MD      HYDROmorphone  0 2 mg Intravenous Q4H PRN Riya Brandon MD      mirtazapine  45 mg Oral HS Riya Brandon MD      multivitamin-minerals  1 tablet Oral Daily Riya Brandon [place]      naloxone  0 04 mg Intravenous Q1MIN PRN Riya Brandon MD      ondansetron  4 mg Intravenous Q4H PRN Riya Brandon MD      oxyCODONE  2 5 mg Oral Q4H PRN Riya Brandon MD      oxyCODONE  5 mg Oral Q4H PRN Riya Brandon MD      pantoprazole  20 mg Oral Early Morning Riya Brandon MD      thiamine  100 mg Oral Daily Riya Brandon MD       Continuous Infusions:   PRN Meds:  HYDROmorphone    naloxone    ondansetron    oxyCODONE    oxyCODONE    Surgical procedures (if appropriate):

## 2021-02-20 NOTE — PHYSICAL THERAPY NOTE
PHYSICAL THERAPY EVALUATION  NAME: Oscar Cabrera Stump  AGE:   78 y o  MRN:  [id number]  ADMIT DX: Low back pain [M54 5]  Back pain [M54 9]  Ambulatory dysfunction [R26 2]    PMH:   Past Medical History:   Diagnosis Date    Dementia ([address] )     per family    Hypertension     IBS (irritable bowel syndrome)      LENGTH OF STAY: 0       21 1110   PT Last Visit   PT Visit Date 21   Note Type   Note type Evaluation   Pain Assessment   Pain Assessment Tool Pain Assessment not indicated - pt denies pain   Home Living   Type of [address] Two [address]   Additional Comments Pt is a questionable historian  Per chart, pt recently moved in with her daughter after her  passed away  Prior Function   Lives With Daughter   ADL Assistance Needs assistance  (showers)   IADLs Needs assistance   Vocational Retired   Comments (-)    Restrictions/Precautions   Wells Dorian Bearing Precautions Per Order No   Other Precautions Cognitive; Chair Alarm; Bed Alarm; Fall Risk   General   Family/Caregiver Present No   Cognition   Overall Cognitive Status Impaired   Attention Attends with cues to redirect   Memory Decreased recall of precautions;Decreased recall of recent events;Decreased short term memory   Following Commands Follows one step commands with increased time or repetition   Comments Pt identified by hospital bracelet/name and   RLE Assessment   RLE Assessment X   Strength RLE   RLE Overall Strength 4-/5  (functionally)   LLE Assessment   LLE Assessment X   Strength LLE   LLE Overall Strength 4-/5  (functionally)   Bed Mobility   Supine to Sit 5  Supervision   Additional items Increased time required;HOB elevated; Bedrails   Sit to Supine 5  Supervision   Additional items HOB elevated; Bedrails; Increased time required;Verbal cues   Transfers   Sit to Stand 5  Supervision   Additional items Increased time required;Verbal cues   Stand to Sit 5  Supervision   Additional items Increased time required;Verbal cues   Ambulation/Elevation   Gait pattern Improper Weight shift;Decreased foot clearance; Short stride; Excessively slow  (x1 LOB)   Gait Assistance 4  Minimal assist  (min/CGA)   Additional items Assist x 1;Verbal cues   Assistive Device None;Rolling walker  (trials with and without RW)   Distance 100` x1 without AD, 60` x1 with RW   Stair Management Assistance 4  Minimal assist   Additional items Assist x 1;Verbal cues   Stair Management Technique One rail L  (on step stool in room with bed rail as hand rail)   Number of Stairs 5   Balance   Static Sitting Good   Dynamic Sitting Fair +   Static Standing Fair -   Dynamic Standing Fair -   Ambulatory Poor +   Endurance Deficit   Endurance Deficit Yes   Endurance Deficit Description limited ambulation distance, fatigue   Activity Tolerance   Activity Tolerance Patient tolerated treatment well;Patient limited by fatigue   Nurse Made Aware Per RN, pt appropriate to evaluate   Assessment   Prognosis Fair   Problem List Decreased strength;Decreased endurance; Impaired balance;Decreased mobility; Decreased cognition; Impaired judgement;Decreased safety awareness   Goals   Patient Goals to get out of here   STG Expiration Date 03/01/21   Short Term Goal #1 Pt will be able to: (1) perform bed mobility with mod I to decrease caregiver burden (2) perform sit to stand with mod I to increase level of independence and promote safe toiletting and transfers (3) ambulate at least 200` with mod I and least restrictive AD to increase activity tolerance and allow for safe community mobilization (4) increase standing balance by 1 grade to decrease risk of falls (5) negotiate at least a full flight of stairs with supervision and use of hand rail to allow safe access into home   PT Treatment Day 0   Plan   Treatment/Interventions Functional transfer training;LE strengthening/ROM; Elevations; Therapeutic exercise; Endurance training;Patient/family training;Equipment eval/education;Gait training;Bed mobility;Cognitive reorientation   PT Frequency Other (Comment)  (3-5x/week)   Recommendation   PT Discharge Recommendation Post-Acute Rehabilitation Services; Other (Comment)  (vs  HHPT and 24/7 support)   Equipment Recommended Walker   AM-PAC Basic Mobility Inpatient   Turning in Bed Without Bedrails 3   Lying on Back to Sitting on Edge of Flat Bed 3   Moving Bed to Chair 3   Standing Up From Chair 3   Walk in Room 3   Climb 3-5 Stairs 3   Basic Mobility Inpatient Raw Score 18   Basic Mobility Standardized Score 41 05   Barthel Index   Feeding 10   Bathing 0   Grooming Score 5   Dressing Score 5   Bladder Score 10   Bowels Score 10   Toilet Use Score 5   Transfers (Bed/Chair) Score 10   Mobility (Level Surface) Score 10   Stairs Score 5   Barthel Index Score 70     Pt's gait speed was 0 495 m/s  Gait Speed Interpretation:  Gain of 0 1 m/s is a predictor of well-being in those w/ abnormal walking speed compared to age-patched peers    Household ambulator: <0 4 m/s  Limited community ambulator: 0 4-0 8 m/s  [employer] ambulator: 0 8-1 2 m/s  Able to safely cross streets: >1 2 m/s    Assessment: Pt is a 78 y o  female seen for PT evaluation s/p admit to [address] [place] [address] on 2/19/2021 w/ Chronic low back pain  Order placed for PT  Comorbidities affecting pt's physical performance at time of assessment listed above  Personal factors affecting pt at time of IE include: multi-level environment, advanced age, inability to perform IADLs, inability to perform ADLs and limited insight into impairments  Prior to admission, pt was lived in multi-level home and lived with daughter  Upon evaluation: Pt requires supervision for bed mobility, supervision for sit to stand, min A for ambulation with and without AD, and min A for stair negotiation  (Please find full objective findings from PT assessment regarding body systems outlined above)   Impairments and limitations also listed above, especially due to weakness, impaired balance, decreased endurance, gait deviations, decreased activity tolerance, decreased safety awareness, impaired judgement, fall risk and decreased cognition  The following objective measures performed on IE also reveal limitations: Barthel Index 70/100  Pt's clinical presentation is currently unstable/unpredictable seen in pt's presentation of decreased safety awareness, impulsivity, fall risk, and limited insight into deficits  Pt to benefit from continued skilled PT tx while in hospital and upon DC to address deficits as defined above and maximize level of functional mobility  From PT/mobility standpoint, recommendation at time of d/c would be inpatient rehab vs  Home PT pending progress and 24/7 support  Recommend progression of ambulation and initiation of HEP as appropriate        Laurence Carlson, PT,DPT

## 2021-02-20 NOTE — UTILIZATION REVIEW
Initial Clinical Review  OBSERVATION 2/19/21 @1339 CONVERTED TO INPATIENT ADMISSION 2/20/21 @1558 DUE TO CONTINUED STAY REQUIRED TO EVALUATE AND TREAT PATIENT WITH BACK PAIN AND  MULTIPLE FALLS WITH PT/OT,CASE MANAGEMENT FOR SAFE DISCHARGE PLANNING, WILL CONTINUE TO MONITOR FOR HYPOGLYCEMIA AFTER EVENT 2/20/21  Admission: Date/Time/Statement:   Admission Orders (From admission, onward)     Ordered        02/20/21 1558  Inpatient Admission  Once         02/19/21 1339  Place in Observation  Once                   Orders Placed This Encounter   Procedures    Inpatient Admission     Standing Status:   Standing     Number of Occurrences:   1     Order Specific Question:   Level of Care     Answer:   Med Surg [16]     Order Specific Question:   Estimated length of stay     Answer:   More than 2 Midnights     Order Specific Question:   Certification     Answer:   I certify that inpatient services are medically necessary for this patient for a duration of greater than two midnights  See H&P and MD Progress Notes for additional information about the patient's course of treatment  ED Arrival Information     Expected Arrival Acuity Means of Arrival Escorted By Service Admission Type    - 2/19/2021 10:19 Urgent Ambulance [facility] General Medicine Urgent    Arrival Complaint    Back Pain        Chief Complaint   Patient presents with    Back Pain     pt reports backpain since last night, unable to ambulate or lay down per pt  Per EMS pt was refusing to ambulate d/t pain  able to ambulate with assistance at residence  Assessment/Plan: 78 y o  female who presents after experiencing sudden onset low back pain, rendering her unable to ambulate due to the pain  Patient has history of osteoporosis  Other problems include  Depression with suicidal ideation, anemia of chronic disease, alcohol intoxication  Recent COVID infection on December 2020     As per daughter her mother was prescribed a 6 day steroid taper  Patient has no saddle anesthesia, no bladder or bowel incontinence or any sensory disturbance does  Denies any history of heavy lifting ( also confirmed by daughter)  Hx frequent falls in the past     Electrolytes were unremarkable, CBC shows a low hemoglobin, however this appears to be at baseline  After receiving pain medication, patient was able to ambulate without assistance  Lumbar x-ray showing chronic compression deformities in L1 and L2, unchanged from previous imaging studies  Acute on Chronic low back pain  Assessment & Plan  ·  No saddle anesthesia, bladder/bowel incontinence or sensory disturbance in lower extremities  ·  Prescribed prednisone on her last visit to her  PCP  Confirmed by daughter  History of osteoporosis  · X-ray lumbar spine: Chronic compression deformities L1 and L2, unchanged from previous imaging studies  Multilevel degenerative spondylosis  also noted  · Able to ambulate without assistance, S/p   Pain management with fentanyl and morphine    Plan:  · Continue pain management  · PT/OT  · Case management consulted for possible placement a [place]  · Fall precautions in place  Multiple falls  Assessment & Plan  · Noted in history  · Last admission in December      Plan:   · Fall precautions     COVID-19 virus infection  Assessment & Plan  · Admitted in December 2020  · No current symptoms  Plan:  · Continue vitamins  · Continue Eliquis 2 5  ED Triage Vitals [02/19/21 1033]   Temperature Pulse Respirations Blood Pressure SpO2   98 1 °F (36 7 °C) 85 18 145/77 97 %      Temp Source Heart Rate Source Patient Position - Orthostatic VS BP Location FiO2 (%)   Oral Monitor Lying Right arm --      Pain Score       8          Wt Readings from Last 1 Encounters:   02/19/21 55 3 kg (122 lb)     Additional Vital Signs:   Date/Time  Temp  Pulse  Resp  BP  MAP (mmHg)  SpO2  O2 Device  Patient Position - Orthostatic VS   02/19/21 2100  97 3 °F (36 3 °C)Abnormal   91  18  151/80  104 96 %  None (Room air)  Lying   02/19/21 2040  --  82  16  129/74  --  98 %  --  --   02/19/21 1816  --  80  16  --  --  97 %  --  --   02/19/21 1518  --  82  18  132/76  --  97 %  --  --   02/19/21 1243  --  84  18  --  --  97 %  --         Pertinent Labs/Diagnostic Test Results:   2/19 XR L spine  Chronic compression deformities L1 and L2, unchanged   Diffuse osteopenia   Multilevel degenerative spondylosis, dextroscoliosis   Stable exam      Results from last 7 days   Lab Units 02/20/21  0551 02/19/21  1135   WBC Thousand/uL 7 35 9 77   HEMOGLOBIN g/dL 10 2* 10 3*   HEMATOCRIT % 32 8* 33 4*   PLATELETS Thousands/uL 312 321   NEUTROS ABS Thousands/µL  --  6 51         Results from last 7 days   Lab Units 02/20/21  0551 02/19/21  1135   SODIUM mmol/L 139 139   POTASSIUM mmol/L 4 6 4 6   CHLORIDE mmol/L 104 104   CO2 mmol/L 22 29   ANION GAP mmol/L 13 6   BUN mg/dL 33* 20   CREATININE mg/dL 1 00 1 02   EGFR ml/min/1 73sq m 53 51   CALCIUM mg/dL 8 3 8 2*     Results from last 7 days   Lab Units 02/20/21  0551   AST U/L 21   ALT U/L 22   ALK PHOS U/L 88   TOTAL PROTEIN g/dL 6 8   ALBUMIN g/dL 2 7*   TOTAL BILIRUBIN mg/dL 0 55     Results from last 7 days   Lab Units 02/20/21  1004   POC GLUCOSE mg/dl 110     Results from last 7 days   Lab Units 02/20/21  0551 02/19/21  1135   GLUCOSE RANDOM mg/dL 46* 88             ED Treatment:   Medication Administration from 02/19/2021 1019 to 02/19/2021 2048       Date/Time Order Dose Route Action Action by Comments     02/19/2021 1152 fentanyl citrate (PF) 100 MCG/2ML 25 mcg 25 mcg Intravenous Given Evgeny Flores, TACOS      02/19/2021 1243 morphine injection 2 mg 2 mg Intravenous Given Evgeny Flores, TACOS      02/19/2021 1901 docusate sodium (COLACE) capsule 100 mg 100 mg Oral Given Evgeny Flores, TACOS      02/19/2021 1518 acetaminophen (TYLENOL) tablet 975 mg 975 mg Oral Given Evgeny Flores RN      02/19/2021 1625 dicyclomine (BENTYL) tablet 20 mg 20 mg Oral Not Given Evgeny Flores RN 02/19/2021 1628 enoxaparin (LOVENOX) subcutaneous injection 40 mg 40 mg Subcutaneous Given Sherrie Webb RN         Past Medical History:   Diagnosis Date    Dementia ([address] )     per family    Hypertension     IBS (irritable bowel syndrome)      Present on Admission:   Depression with suicidal ideation   History of alcohol abuse   Acute on Chronic low back pain   Irritable bowel syndrome with diarrhea   Anemia of chronic disease   COVID-19 virus infection      Admitting Diagnosis: Low back pain [M54 5]  Back pain [M54 9]  Ambulatory dysfunction [R26 2]  Age/Sex: 78 y o  female  Admission Orders:  Scheduled Medications:  acetaminophen, 975 mg, Oral, Q8H [address]  cholecalciferol, 2,000 Units, Oral, Daily  Diclofenac Sodium, 2 g, Topical, 4x Daily  dicyclomine, 20 mg, Oral, 4x Daily (AC & HS)  docusate sodium, 100 mg, Oral, BID  donepezil, 10 mg, Oral, HS  enoxaparin, 40 mg, Subcutaneous, G33G JAD  folic acid, 1 mg, Oral, Daily  mirtazapine, 45 mg, Oral, HS  multivitamin-minerals, 1 tablet, Oral, Daily  pantoprazole, 20 mg, Oral, Early Morning  thiamine, 100 mg, Oral, Daily      Continuous IV Infusions:none     PRN Meds:  HYDROmorphone, 0 2 mg, Intravenous, Q4H PRN  naloxone, 0 04 mg, Intravenous, Q1MIN PRN  ondansetron, 4 mg, Intravenous, Q4H PRN  oxyCODONE, 2 5 mg, Oral, Q4H PRN  oxyCODONE, 5 mg, Oral, Q4H PRN    SCD's  Fall monitoring    IP CONSULT TO CASE MANAGEMENT    Network Utilization Review Department  ATTENTION: Please call with any questions or concerns to [phone] and carefully listen to the prompts so that you are directed to the right person  All voicemails are confidential   Lc Ko all requests for admission clinical reviews, approved or denied determinations and any other requests to dedicated fax number below belonging to the campus where the patient is receiving treatment   List of dedicated fax numbers for the Facilities:  FACILITY NAME UR FAX NUMBER   ADMISSION DENIALS (Administrative/Medical [facility]) [phone]   [address] (Maternity/NICU/Pediatrics) 261 [facility],7Th Floor [facility] [address] [place] [address]  [phone]   [address] (Avis Diaz "Rachel" 103) [address] [place] [address] [address] Gloria [address] [phone]   [place] [id number] [phone]

## 2021-02-20 NOTE — PLAN OF CARE
Problem: PHYSICAL THERAPY ADULT  Goal: Performs mobility at highest level of function for planned discharge setting  See evaluation for individualized goals  Description: Treatment/Interventions: Functional transfer training, LE strengthening/ROM, Elevations, Therapeutic exercise, Endurance training, Patient/family training, Equipment eval/education, Gait training, Bed mobility, Cognitive reorientation  Equipment Recommended: Christine Mahan       See flowsheet documentation for full assessment, interventions and recommendations  Note: Prognosis: Fair  Problem List: Decreased strength, Decreased endurance, Impaired balance, Decreased mobility, Decreased cognition, Impaired judgement, Decreased safety awareness  Assessment: Pt is a 78 y o  female seen for PT evaluation s/p admit to [facility] on 2/19/2021 w/ Chronic low back pain  Order placed for PT  Comorbidities affecting pt's physical performance at time of assessment listed above  Personal factors affecting pt at time of IE include: multi-level environment, advanced age, inability to perform IADLs, inability to perform ADLs and limited insight into impairments  Prior to admission, pt was lived in multi-level home and lived with daughter  Upon evaluation: Pt requires supervision for bed mobility, supervision for sit to stand, min A for ambulation with and without AD, and min A for stair negotiation  (Please find full objective findings from PT assessment regarding body systems outlined above)  Impairments and limitations also listed above, especially due to  weakness, impaired balance, decreased endurance, gait deviations, decreased activity tolerance, decreased safety awareness, impaired judgement, fall risk and decreased cognition  The following objective measures performed on IE also reveal limitations: Barthel Index 70/100   Pt's clinical presentation is currently unstable/unpredictable seen in pt's presentation of decreased safety awareness, impulsivity, fall risk, and limited insight into deficits  Pt to benefit from continued skilled PT tx while in hospital and upon DC to address deficits as defined above and maximize level of functional mobility  From PT/mobility standpoint, recommendation at time of d/c would be inpatient rehab vs  Home PT pending progress and 24/7 support  Recommend progression of ambulation and initiation of HEP as appropriate  PT Discharge Recommendation: Post-Acute Rehabilitation Services, Other (Comment)(vs  HHPT and 24/7 support)          See flowsheet documentation for full assessment

## 2021-02-20 NOTE — PLAN OF CARE
Problem: OCCUPATIONAL THERAPY ADULT  Goal: Performs self-care activities at highest level of function for planned discharge setting  See evaluation for individualized goals  Description: Treatment Interventions: ADL retraining, Functional transfer training, UE strengthening/ROM, Endurance training, Patient/family training, Equipment evaluation/education, Cognitive reorientation, Compensatory technique education, Energy conservation, Activityengagement          See flowsheet documentation for full assessment, interventions and recommendations  Note: Limitation: Decreased ADL status, Decreased UE strength, Decreased Safe judgement during ADL, Decreased endurance, Decreased self-care trans, Decreased high-level ADLs, Decreased cognition  Prognosis: Good  Assessment: Patient is a 78 y o  female admitted to [address] [place] [address] on 2/19/2021 due to Chronic low back pain  Comorbidities affecting pt's physical performance at time of assessment include hx COVID, multiple falls, depression with suicidial ideation, hx alcohol abuse, IBS  Patient has active OT orders  PTA pt living with daughter in [facility], pt is an unreliable historian, however states (A) with ADLs and IADLs, no use of AD at baseline, (+)falls, (-)drives  Personal factors affecting pt at time of IE include:steps to enter environment, limited home support, difficulty performing ADLS, difficulty performing IADLS , limited insight into deficits, compliance and decreased initiation and engagement   At the time of evaluation patient currently requires (S) for UB ADLs, max A for LB ADLs, (S) for functional transfers, and CGA for functional mobility   The following deficits affected patient's occupational performance weakness, decreased functional strength, decreased functional balance, decreased activity tolerance, decreased safety awareness, impaired attention, impaired memory, impaired sequencing, impaired problem solving, impaired interpersonal skills and decreased coping skills  Patient would benefit from skilled OT services while in the hospital to address above deficits  Occupational performance areas to be addressed include ADL retraining, bed mobility, functional transfer training, endurance training, cognitive reorientation, patient/family training, equipment evaluation/education, compensatory technique education, activity engagement and activity tolerance in order to maximize patient's level of function  The patient's raw score on the AM-PAC Daily Activity inpatient short form is 17, standardized score is 37 26, less than 39 4  Patients at this level are likely to benefit from DC to post-acute rehabilitation services  From OT standpoint recommend Post acute rehab vs 24/7 (S) and (A) upon D/C  Will continue to follow pt 2-3x/week to address the goals listed below evaluation        OT Discharge Recommendation: Other (Comment)(rehab vs 24/7 (S) and (A))

## 2021-02-20 NOTE — ASSESSMENT & PLAN NOTE
· No saddle anesthesia, bladder/bowel incontinence or sensory disturbance in lower extremities  · Prescribed prednisone on her last visit to her  PCP  Confirmed by daughter  History of osteoporosis  · X-ray lumbar spine: Chronic compression deformities L1 and L2, unchanged from previous imaging studies   Multilevel degenerative spondylosis  also noted  · Able to ambulate without pain    Plan:  · Continue pain management  · PT/OT:  Post acute rehab recommended  · Case management consulted for possible placement a [place]  · Fall precautions in place

## 2021-02-20 NOTE — PLAN OF CARE
Problem: Potential for Falls  Goal: Patient will remain free of falls  Description: INTERVENTIONS:  - Assess patient frequently for physical needs  -  Identify cognitive and physical deficits and behaviors that affect risk of falls    -  [place] fall precautions as indicated by assessment   - Educate patient/family on patient safety including physical limitations  - Instruct patient to call for assistance with activity based on assessment  - Modify environment to reduce risk of injury  - Consider OT/PT consult to assist with strengthening/mobility  Outcome: Progressing     Problem: Prexisting or High Potential for Compromised Skin Integrity  Goal: Skin integrity is maintained or improved  Description: INTERVENTIONS:  - Identify patients at risk for skin breakdown  - Assess and monitor skin integrity  - Assess and monitor nutrition and hydration status  - Monitor labs   - Assess for incontinence   - Turn and reposition patient  - Assist with mobility/ambulation  - Relieve pressure over bony prominences  - Avoid friction and shearing  - Provide appropriate hygiene as needed including keeping skin clean and dry  - Evaluate need for skin moisturizer/barrier cream  - Collaborate with interdisciplinary team   - Patient/family teaching  - Consider wound care consult   Outcome: Progressing     Problem: PAIN - ADULT  Goal: Verbalizes/displays adequate comfort level or baseline comfort level  Description: Interventions:  - Encourage patient to monitor pain and request assistance  - Assess pain using appropriate pain scale  - Administer analgesics based on type and severity of pain and evaluate response  - Implement non-pharmacological measures as appropriate and evaluate response  - Consider cultural and social influences on pain and pain management  - Notify physician/advanced practitioner if interventions unsuccessful or patient reports new pain  Outcome: Progressing     Problem: INFECTION - ADULT  Goal: Absence or prevention of progression during hospitalization  Description: INTERVENTIONS:  - Assess and monitor for signs and symptoms of infection  - Monitor lab/diagnostic results  - Monitor all insertion sites, i e  indwelling lines, tubes, and drains  - Monitor endotracheal if appropriate and nasal secretions for changes in amount and color  - [place] appropriate cooling/warming therapies per order  - Administer medications as ordered  - Instruct and encourage patient and family to use good hand hygiene technique  - Identify and instruct in appropriate isolation precautions for identified infection/condition  Outcome: Progressing  Goal: Absence of fever/infection during neutropenic period  Description: INTERVENTIONS:  - Monitor WBC    Outcome: Progressing     Problem: SAFETY ADULT  Goal: Patient will remain free of falls  Description: INTERVENTIONS:  - Assess patient frequently for physical needs  -  Identify cognitive and physical deficits and behaviors that affect risk of falls    -  [place] fall precautions as indicated by assessment   - Educate patient/family on patient safety including physical limitations  - Instruct patient to call for assistance with activity based on assessment  - Modify environment to reduce risk of injury  - Consider OT/PT consult to assist with strengthening/mobility  Outcome: Progressing  Goal: Maintain or return to baseline ADL function  Description: INTERVENTIONS:  -  Assess patient's ability to carry out ADLs; assess patient's baseline for ADL function and identify physical deficits which impact ability to perform ADLs (bathing, care of mouth/teeth, toileting, grooming, dressing, etc )  - Assess/evaluate cause of self-care deficits   - Assess range of motion  - Assess patient's mobility; develop plan if impaired  - Assess patient's need for assistive devices and provide as appropriate  - Encourage maximum independence but intervene and supervise when necessary  - Involve family in performance of ADLs  - Assess for home care needs following discharge   - Consider OT consult to assist with ADL evaluation and planning for discharge  - Provide patient education as appropriate  Outcome: Progressing  Goal: Maintain or return mobility status to optimal level  Description: INTERVENTIONS:  - Assess patient's baseline mobility status (ambulation, transfers, stairs, etc )    - Identify cognitive and physical deficits and behaviors that affect mobility  - Identify mobility aids required to assist with transfers and/or ambulation (gait belt, sit-to-stand, lift, walker, cane, etc )  - [place] fall precautions as indicated by assessment  - Record patient progress and toleration of activity level on Mobility SBAR; progress patient to next Phase/Stage  - Instruct patient to call for assistance with activity based on assessment  - Consider rehabilitation consult to assist with strengthening/weightbearing, etc   Outcome: Progressing     Problem: DISCHARGE PLANNING  Goal: Discharge to home or other facility with appropriate resources  Description: INTERVENTIONS:  - Identify barriers to discharge w/patient and caregiver  - Arrange for needed discharge resources and transportation as appropriate  - Identify discharge learning needs (meds, wound care, etc )  - Arrange for interpretive services to assist at discharge as needed  - Refer to Case Management Department for coordinating discharge planning if the patient needs post-hospital services based on physician/advanced practitioner order or complex needs related to functional status, cognitive ability, or social support system  Outcome: Progressing     Problem: Knowledge Deficit  Goal: Patient/family/caregiver demonstrates understanding of disease process, treatment plan, medications, and discharge instructions  Description: Complete learning assessment and assess knowledge base    Interventions:  - Provide teaching at level of understanding  - Provide teaching via preferred learning methods  Outcome: Progressing

## 2021-02-21 LAB
ANION GAP SERPL CALCULATED.3IONS-SCNC: 7 MMOL/L (ref 4–13)
BUN SERPL-MCNC: 26 MG/DL (ref 5–25)
CALCIUM SERPL-MCNC: 8.2 MG/DL (ref 8.3–10.1)
CHLORIDE SERPL-SCNC: 102 MMOL/L (ref 100–108)
CO2 SERPL-SCNC: 27 MMOL/L (ref 21–32)
CREAT SERPL-MCNC: 1.03 MG/DL (ref 0.6–1.3)
ERYTHROCYTE [DISTWIDTH] IN BLOOD BY AUTOMATED COUNT: 16.8 % (ref 11.6–15.1)
GFR SERPL CREATININE-BSD FRML MDRD: 51 ML/MIN/1.73SQ M
GLUCOSE SERPL-MCNC: 100 MG/DL (ref 65–140)
HCT VFR BLD AUTO: 31.3 % (ref 34.8–46.1)
HGB BLD-MCNC: 9.8 G/DL (ref 11.5–15.4)
MCH RBC QN AUTO: 27.8 PG (ref 26.8–34.3)
MCHC RBC AUTO-ENTMCNC: 31.3 G/DL (ref 31.4–37.4)
MCV RBC AUTO: 89 FL (ref 82–98)
PLATELET # BLD AUTO: 282 THOUSANDS/UL (ref 149–390)
PMV BLD AUTO: 8.6 FL (ref 8.9–12.7)
POTASSIUM SERPL-SCNC: 4.1 MMOL/L (ref 3.5–5.3)
RBC # BLD AUTO: 3.53 MILLION/UL (ref 3.81–5.12)
SODIUM SERPL-SCNC: 136 MMOL/L (ref 136–145)
WBC # BLD AUTO: 6 THOUSAND/UL (ref 4.31–10.16)

## 2021-02-21 PROCEDURE — 99232 SBSQ HOSP IP/OBS MODERATE 35: CPT | Performed by: INTERNAL MEDICINE

## 2021-02-21 PROCEDURE — 85027 COMPLETE CBC AUTOMATED: CPT | Performed by: INTERNAL MEDICINE

## 2021-02-21 PROCEDURE — 80048 BASIC METABOLIC PNL TOTAL CA: CPT | Performed by: INTERNAL MEDICINE

## 2021-02-21 RX ORDER — LOPERAMIDE HYDROCHLORIDE 2 MG/1
2 CAPSULE ORAL 3 TIMES DAILY PRN
Status: DISCONTINUED | OUTPATIENT
Start: 2021-02-21 | End: 2021-02-23 | Stop reason: HOSPADM

## 2021-02-21 RX ADMIN — DICYCLOMINE HYDROCHLORIDE 20 MG: 20 TABLET ORAL at 15:11

## 2021-02-21 RX ADMIN — ACETAMINOPHEN 975 MG: 325 TABLET, FILM COATED ORAL at 06:17

## 2021-02-21 RX ADMIN — DICLOFENAC SODIUM 2 G: 10 GEL TOPICAL at 11:53

## 2021-02-21 RX ADMIN — DICYCLOMINE HYDROCHLORIDE 20 MG: 20 TABLET ORAL at 06:17

## 2021-02-21 RX ADMIN — DICYCLOMINE HYDROCHLORIDE 20 MG: 20 TABLET ORAL at 21:20

## 2021-02-21 RX ADMIN — DOCUSATE SODIUM 100 MG: 100 CAPSULE, LIQUID FILLED ORAL at 09:23

## 2021-02-21 RX ADMIN — LOPERAMIDE HYDROCHLORIDE 2 MG: 2 CAPSULE ORAL at 18:16

## 2021-02-21 RX ADMIN — Medication 1 TABLET: at 09:24

## 2021-02-21 RX ADMIN — Medication 6 MG: at 21:20

## 2021-02-21 RX ADMIN — DICLOFENAC SODIUM 2 G: 10 GEL TOPICAL at 18:17

## 2021-02-21 RX ADMIN — DICYCLOMINE HYDROCHLORIDE 20 MG: 20 TABLET ORAL at 11:53

## 2021-02-21 RX ADMIN — ENOXAPARIN SODIUM 40 MG: 40 INJECTION SUBCUTANEOUS at 09:24

## 2021-02-21 RX ADMIN — DONEPEZIL HYDROCHLORIDE 10 MG: 5 TABLET, FILM COATED ORAL at 21:20

## 2021-02-21 RX ADMIN — PANTOPRAZOLE SODIUM 20 MG: 20 TABLET, DELAYED RELEASE ORAL at 06:17

## 2021-02-21 RX ADMIN — Medication 2000 UNITS: at 09:24

## 2021-02-21 RX ADMIN — THIAMINE HCL TAB 100 MG 100 MG: 100 TAB at 09:24

## 2021-02-21 RX ADMIN — MIRTAZAPINE 45 MG: 15 TABLET, FILM COATED ORAL at 21:20

## 2021-02-21 RX ADMIN — FOLIC ACID 1 MG: 1 TABLET ORAL at 09:24

## 2021-02-21 RX ADMIN — ACETAMINOPHEN 325 MG: 325 TABLET, FILM COATED ORAL at 15:11

## 2021-02-21 NOTE — PROGRESS NOTES
Progress Note - Kitty Guajardo Stump 1940, 78 y o  female MRN: [id number]    Unit/Bed#: W -01 Encounter: [id number]    Primary Care Provider: Shahrzad Fernández DO   Date and time admitted to hospital: 2/19/2021 10:20 AM      * Acute on Chronic low back pain  Assessment & Plan  · No saddle anesthesia, bladder/bowel incontinence or sensory disturbance in lower extremities  · Prescribed prednisone on her last visit to her  PCP  Confirmed by daughter  History of osteoporosis  · X-ray lumbar spine: Chronic compression deformities L1 and L2, unchanged from previous imaging studies  Multilevel degenerative spondylosis  also noted  · Able to ambulate without pain    Plan:  · Continue pain management  · PT/OT:  Post acute rehab recommended  · Case management consulted for possible placement a [facility]  · Fall precautions in place    Hypoglycemia  Assessment & Plan  Patient had a hypoglycemic event in a m  [facility] increased glucose after food intake  Irritable bowel syndrome with diarrhea  Assessment & Plan   Continue home medications: Bentyl    Anemia of chronic disease  Assessment & Plan  · At baseline  · Hemodynamically stable    History of alcohol abuse  Assessment & Plan  Last alcohol consumption more than a month ago  No need for CIWA protocol    Depression with suicidal ideation  Assessment & Plan  · Recently discharged on February 1st   · No suicidal ideation  at this moment  · Continue home medications  Multiple falls  Assessment & Plan  · Noted in history  · Last admission in December      Plan:   · Fall precautions    COVID-19 virus infection  Assessment & Plan  · Admitted in December 2020  · No current symptoms  Plan:  · Continue vitamins  · Continue Eliquis 2 5        VTE Pharmacologic Prophylaxis:   Pharmacologic: Enoxaparin (Lovenox)  Mechanical VTE Prophylaxis in Place: Yes    Discussions with Specialists or Other Care Team Provider: none    Education and Discussions with Family / Patient: family    Current Length of Stay: 1 day(s)    Current Patient Status: Inpatient     Discharge Plan / Estimated Discharge Date: monday    Code Status: Level 1 - Full Code      Subjective:   Feels well    Objective:     Vitals:   Temp (24hrs), Av 4 °F (36 9 °C), Min:97 2 °F (36 2 °C), Max:99 3 °F (37 4 °C)    Temp:  [97 2 °F (36 2 °C)-99 3 °F (37 4 °C)] 99 3 °F (37 4 °C)  HR:  [68-78] 78  Resp:  [16] 16  BP: (121-130)/(68-69) 121/68  SpO2:  [96 %-99 %] 96 %  Body mass index is 19 69 kg/m²  Input and Output Summary (last 24 hours): Intake/Output Summary (Last 24 hours) at 2021 1117  Last data filed at 2021 1712  Gross per 24 hour   Intake 480 ml   Output --   Net 480 ml       Physical Exam:     Physical Exam  Constitutional:       General: She is awake  She is not in acute distress  Appearance: She is underweight  HENT:      Head: Normocephalic and atraumatic  Nose: Nose normal       Mouth/Throat:      Mouth: Mucous membranes are moist    Eyes:      Extraocular Movements: Extraocular movements intact  Cardiovascular:      Rate and Rhythm: Normal rate and regular rhythm  Pulses: Normal pulses  Heart sounds: Normal heart sounds  No murmur  No friction rub  Pulmonary:      Effort: Pulmonary effort is normal  No respiratory distress  Breath sounds: Normal breath sounds  No stridor  No wheezing, rhonchi or rales  Chest:      Chest wall: No tenderness  Abdominal:      General: Bowel sounds are normal  There is no distension  Palpations: Abdomen is soft  Tenderness: There is no abdominal tenderness  There is no right CVA tenderness, left CVA tenderness, guarding or rebound  Musculoskeletal: Normal range of motion  General: No swelling, tenderness or deformity  Right lower leg: No edema  Left lower leg: No edema  Skin:     General: Skin is warm  Coloration: Skin is not jaundiced  Findings: No erythema, lesion or rash  Neurological:      General: No focal deficit present  Mental Status: She is alert and oriented to person, place, and time  Motor: Weakness: Bilaterally lower extremities  Psychiatric:         Mood and Affect: Mood normal          Behavior: Behavior normal  Behavior is cooperative  Cognition and Memory: Cognition is impaired  Memory is impaired  Additional Data:     Labs:    Results from last 7 days   Lab Units 02/21/21  0520  02/19/21  1135   WBC Thousand/uL 6 00   < > 9 77   HEMOGLOBIN g/dL 9 8*   < > 10 3*   HEMATOCRIT % 31 3*   < > 33 4*   PLATELETS Thousands/uL 282   < > 321   NEUTROS PCT %  --   --  66   LYMPHS PCT %  --   --  16   MONOS PCT %  --   --  14*   EOS PCT %  --   --  3    < > = values in this interval not displayed  Results from last 7 days   Lab Units 02/21/21  0520 02/20/21  0551   POTASSIUM mmol/L 4 1 4 6   CHLORIDE mmol/L 102 104   CO2 mmol/L 27 22   BUN mg/dL 26* 33*   CREATININE mg/dL 1 03 1 00   CALCIUM mg/dL 8 2* 8 3   ALK PHOS U/L  --  88   ALT U/L  --  22   AST U/L  --  21           * I Have Reviewed All Lab Data Listed Above  * Additional Pertinent Lab Tests Reviewed:  All Labs Within Last 24 Hours Reviewed  Recent Cultures (last 7 days):           Last 24 Hours Medication List:   Current Facility-Administered Medications   Medication Dose Route Frequency Provider Last Rate    acetaminophen  975 mg Oral [facility] Bonnie Bird MD      cholecalciferol  2,000 Units Oral Daily Bonnie Bird MD      Diclofenac Sodium  2 g Topical 4x Daily Bonnie Bird MD      dicyclomine  20 mg Oral 4x Daily [facility] Bonnie Bird MD      docusate sodium  100 mg Oral BID Bonnie Bird MD      donepezil  10 mg Oral HS Bonnie Bird MD      enoxaparin  40 mg Subcutaneous Q24H [address], MD      folic acid  1 mg Oral Daily Bonnie Bird MD      HYDROmorphone  0 2 mg Intravenous Q4H PRN Gilford Ping Marion Chen MD      melatonin  6 mg Oral HS Neto Sanders PA-C      mirtazapine  45 mg Oral HS Bonni Dakin, MD      multivitamin-minerals  1 tablet Oral Daily Bonni Dakin, MD      naloxone  0 04 mg Intravenous Q1MIN PRN Bonni Dakin, MD      ondansetron  4 mg Intravenous Q4H PRN Bonni Dakin, MD      oxyCODONE  2 5 mg Oral Q4H PRN Bonni Dakin, MD      oxyCODONE  5 mg Oral Q4H PRN Bonni Dakin, MD      pantoprazole  20 mg Oral Early Morning Bonni Dakin, MD      thiamine  100 mg Oral Daily Bonni Dakin, MD          Today, Patient Was Seen By: Chelsea Auguste MD    ** Please Note: This note has been constructed using a voice recognition system   **

## 2021-02-21 NOTE — ASSESSMENT & PLAN NOTE
Patient had a hypoglycemic event in a [facility] increased glucose after food intake

## 2021-02-21 NOTE — PLAN OF CARE
Problem: Potential for Falls  Goal: Patient will remain free of falls  Description: INTERVENTIONS:  - Assess patient frequently for physical needs  -  Identify cognitive and physical deficits and behaviors that affect risk of falls    -  [place] fall precautions as indicated by assessment   - Educate patient/family on patient safety including physical limitations  - Instruct patient to call for assistance with activity based on assessment  - Modify environment to reduce risk of injury  - Consider OT/PT consult to assist with strengthening/mobility  Outcome: Progressing     Problem: Prexisting or High Potential for Compromised Skin Integrity  Goal: Skin integrity is maintained or improved  Description: INTERVENTIONS:  - Identify patients at risk for skin breakdown  - Assess and monitor skin integrity  - Assess and monitor nutrition and hydration status  - Monitor labs   - Assess for incontinence   - Turn and reposition patient  - Assist with mobility/ambulation  - Relieve pressure over bony prominences  - Avoid friction and shearing  - Provide appropriate hygiene as needed including keeping skin clean and dry  - Evaluate need for skin moisturizer/barrier cream  - Collaborate with interdisciplinary team   - Patient/family teaching  - Consider wound care consult   Outcome: Progressing     Problem: PAIN - ADULT  Goal: Verbalizes/displays adequate comfort level or baseline comfort level  Description: Interventions:  - Encourage patient to monitor pain and request assistance  - Assess pain using appropriate pain scale  - Administer analgesics based on type and severity of pain and evaluate response  - Implement non-pharmacological measures as appropriate and evaluate response  - Consider cultural and social influences on pain and pain management  - Notify physician/advanced practitioner if interventions unsuccessful or patient reports new pain  Outcome: Progressing     Problem: INFECTION - ADULT  Goal: Absence or prevention of progression during hospitalization  Description: INTERVENTIONS:  - Assess and monitor for signs and symptoms of infection  - Monitor lab/diagnostic results  - Monitor all insertion sites, i e  indwelling lines, tubes, and drains  - Monitor endotracheal if appropriate and nasal secretions for changes in amount and color  - [place] appropriate cooling/warming therapies per order  - Administer medications as ordered  - Instruct and encourage patient and family to use good hand hygiene technique  - Identify and instruct in appropriate isolation precautions for identified infection/condition  Outcome: Progressing  Goal: Absence of fever/infection during neutropenic period  Description: INTERVENTIONS:  - Monitor WBC    Outcome: Progressing     Problem: SAFETY ADULT  Goal: Patient will remain free of falls  Description: INTERVENTIONS:  - Assess patient frequently for physical needs  -  Identify cognitive and physical deficits and behaviors that affect risk of falls    -  [place] fall precautions as indicated by assessment   - Educate patient/family on patient safety including physical limitations  - Instruct patient to call for assistance with activity based on assessment  - Modify environment to reduce risk of injury  - Consider OT/PT consult to assist with strengthening/mobility  Outcome: Progressing  Goal: Maintain or return to baseline ADL function  Description: INTERVENTIONS:  -  Assess patient's ability to carry out ADLs; assess patient's baseline for ADL function and identify physical deficits which impact ability to perform ADLs (bathing, care of mouth/teeth, toileting, grooming, dressing, etc )  - Assess/evaluate cause of self-care deficits   - Assess range of motion  - Assess patient's mobility; develop plan if impaired  - Assess patient's need for assistive devices and provide as appropriate  - Encourage maximum independence but intervene and supervise when necessary  - Involve family in performance of ADLs  - Assess for home care needs following discharge   - Consider OT consult to assist with ADL evaluation and planning for discharge  - Provide patient education as appropriate  Outcome: Progressing  Goal: Maintain or return mobility status to optimal level  Description: INTERVENTIONS:  - Assess patient's baseline mobility status (ambulation, transfers, stairs, etc )    - Identify cognitive and physical deficits and behaviors that affect mobility  - Identify mobility aids required to assist with transfers and/or ambulation (gait belt, sit-to-stand, lift, walker, cane, etc )  - [place] fall precautions as indicated by assessment  - Record patient progress and toleration of activity level on Mobility SBAR; progress patient to next Phase/Stage  - Instruct patient to call for assistance with activity based on assessment  - Consider rehabilitation consult to assist with strengthening/weightbearing, etc   Outcome: Progressing     Problem: DISCHARGE PLANNING  Goal: Discharge to home or other facility with appropriate resources  Description: INTERVENTIONS:  - Identify barriers to discharge w/patient and caregiver  - Arrange for needed discharge resources and transportation as appropriate  - Identify discharge learning needs (meds, wound care, etc )  - Arrange for interpretive services to assist at discharge as needed  - Refer to Case Management Department for coordinating discharge planning if the patient needs post-hospital services based on physician/advanced practitioner order or complex needs related to functional status, cognitive ability, or social support system  Outcome: Progressing     Problem: Knowledge Deficit  Goal: Patient/family/caregiver demonstrates understanding of disease process, treatment plan, medications, and discharge instructions  Description: Complete learning assessment and assess knowledge base    Interventions:  - Provide teaching at level of understanding  - Provide teaching via preferred learning methods  Outcome: Progressing

## 2021-02-22 PROBLEM — E16.2 HYPOGLYCEMIA: Status: RESOLVED | Noted: 2021-02-20 | Resolved: 2021-02-22

## 2021-02-22 LAB
ANION GAP SERPL CALCULATED.3IONS-SCNC: 5 MMOL/L (ref 4–13)
BUN SERPL-MCNC: 22 MG/DL (ref 5–25)
CALCIUM SERPL-MCNC: 8.2 MG/DL (ref 8.3–10.1)
CHLORIDE SERPL-SCNC: 105 MMOL/L (ref 100–108)
CO2 SERPL-SCNC: 29 MMOL/L (ref 21–32)
CREAT SERPL-MCNC: 1.02 MG/DL (ref 0.6–1.3)
ERYTHROCYTE [DISTWIDTH] IN BLOOD BY AUTOMATED COUNT: 17 % (ref 11.6–15.1)
GFR SERPL CREATININE-BSD FRML MDRD: 51 ML/MIN/1.73SQ M
GLUCOSE SERPL-MCNC: 90 MG/DL (ref 65–140)
HCT VFR BLD AUTO: 34.3 % (ref 34.8–46.1)
HGB BLD-MCNC: 10.8 G/DL (ref 11.5–15.4)
MCH RBC QN AUTO: 28.1 PG (ref 26.8–34.3)
MCHC RBC AUTO-ENTMCNC: 31.5 G/DL (ref 31.4–37.4)
MCV RBC AUTO: 89 FL (ref 82–98)
PLATELET # BLD AUTO: 269 THOUSANDS/UL (ref 149–390)
PMV BLD AUTO: 9.2 FL (ref 8.9–12.7)
POTASSIUM SERPL-SCNC: 4.3 MMOL/L (ref 3.5–5.3)
RBC # BLD AUTO: 3.85 MILLION/UL (ref 3.81–5.12)
SODIUM SERPL-SCNC: 139 MMOL/L (ref 136–145)
WBC # BLD AUTO: 4.81 THOUSAND/UL (ref 4.31–10.16)

## 2021-02-22 PROCEDURE — 85027 COMPLETE CBC AUTOMATED: CPT | Performed by: STUDENT IN AN ORGANIZED HEALTH CARE EDUCATION/TRAINING PROGRAM

## 2021-02-22 PROCEDURE — 99232 SBSQ HOSP IP/OBS MODERATE 35: CPT | Performed by: INTERNAL MEDICINE

## 2021-02-22 PROCEDURE — 97110 THERAPEUTIC EXERCISES: CPT

## 2021-02-22 PROCEDURE — 97116 GAIT TRAINING THERAPY: CPT

## 2021-02-22 PROCEDURE — 80048 BASIC METABOLIC PNL TOTAL CA: CPT | Performed by: STUDENT IN AN ORGANIZED HEALTH CARE EDUCATION/TRAINING PROGRAM

## 2021-02-22 RX ADMIN — Medication 6 MG: at 21:17

## 2021-02-22 RX ADMIN — MIRTAZAPINE 45 MG: 15 TABLET, FILM COATED ORAL at 21:17

## 2021-02-22 RX ADMIN — ACETAMINOPHEN 975 MG: 325 TABLET, FILM COATED ORAL at 21:17

## 2021-02-22 RX ADMIN — DICLOFENAC SODIUM 2 G: 10 GEL TOPICAL at 11:24

## 2021-02-22 RX ADMIN — ACETAMINOPHEN 975 MG: 325 TABLET, FILM COATED ORAL at 14:16

## 2021-02-22 RX ADMIN — DICYCLOMINE HYDROCHLORIDE 20 MG: 20 TABLET ORAL at 21:17

## 2021-02-22 RX ADMIN — FOLIC ACID 1 MG: 1 TABLET ORAL at 08:45

## 2021-02-22 RX ADMIN — DONEPEZIL HYDROCHLORIDE 10 MG: 5 TABLET, FILM COATED ORAL at 21:17

## 2021-02-22 RX ADMIN — Medication 2000 UNITS: at 08:45

## 2021-02-22 RX ADMIN — DICYCLOMINE HYDROCHLORIDE 20 MG: 20 TABLET ORAL at 06:00

## 2021-02-22 RX ADMIN — PANTOPRAZOLE SODIUM 20 MG: 20 TABLET, DELAYED RELEASE ORAL at 06:00

## 2021-02-22 RX ADMIN — ENOXAPARIN SODIUM 40 MG: 40 INJECTION SUBCUTANEOUS at 08:45

## 2021-02-22 RX ADMIN — DICYCLOMINE HYDROCHLORIDE 20 MG: 20 TABLET ORAL at 11:23

## 2021-02-22 RX ADMIN — THIAMINE HCL TAB 100 MG 100 MG: 100 TAB at 08:45

## 2021-02-22 RX ADMIN — DICLOFENAC SODIUM 2 G: 10 GEL TOPICAL at 18:41

## 2021-02-22 RX ADMIN — DICLOFENAC SODIUM 2 G: 10 GEL TOPICAL at 21:18

## 2021-02-22 RX ADMIN — Medication 1 TABLET: at 08:45

## 2021-02-22 RX ADMIN — DICYCLOMINE HYDROCHLORIDE 20 MG: 20 TABLET ORAL at 18:40

## 2021-02-22 RX ADMIN — DICLOFENAC SODIUM 2 G: 10 GEL TOPICAL at 08:46

## 2021-02-22 RX ADMIN — ACETAMINOPHEN 975 MG: 325 TABLET, FILM COATED ORAL at 06:00

## 2021-02-22 NOTE — PHYSICAL THERAPY NOTE
PHYSICAL THERAPY NOTE  Patient Name: Tomasz Mari  OSUCB'E Date: 21 1430   PT Last Visit   PT Visit Date 21   Note Type   Note Type Treatment   Pain Assessment   Pain Assessment Tool Pain Assessment not indicated - pt denies pain   Pain Score No Pain   Restrictions/Precautions   Weight Bearing Precautions Per Order No   Other Precautions Cognitive; Chair Alarm; Bed Alarm; Fall Risk   General   Chart Reviewed Yes   Family/Caregiver Present No   Cognition   Orientation Level Oriented to person  (identified by name and )   Following Commands Follows one step commands with increased time or repetition   Subjective   Subjective Pt in bathroom upon entry, agreeable to therapy session  Identified by name and    Bed Mobility   Additional Comments Pt in bathroom upon entry, Upright in bed to end session   Transfers   Sit to Stand 5  Supervision   Additional items Assist x 1; Increased time required   Stand to Sit 5  Supervision   Additional items Assist x 1; Increased time required   Toilet transfer 5  Supervision   Additional items Increased time required   Ambulation/Elevation   Gait pattern Improper Weight shift;Decreased foot clearance; Short stride; Excessively slow   Gait Assistance 5  Supervision   Additional items Assist x 1   Assistive Device Rolling walker   Distance 75'x2   Balance   Static Sitting Good   Dynamic Sitting Fair +   Static Standing Fair -   Dynamic Standing Fair -   Ambulatory Fair   Endurance Deficit   Endurance Deficit Yes   Endurance Deficit Description limited ambulation distance, fatigue   Activity Tolerance   Activity Tolerance Patient tolerated treatment well   Nurse Made Aware RN [facility]   Exercises   Knee AROM Long Arc Quad Sitting;10 reps;AROM; Bilateral   Ankle Pumps Sitting;10 reps;AROM; Bilateral   Marching Sitting;10 reps;AROM; Bilateral;Standing   Assessment   Prognosis Fair Problem List Decreased strength;Decreased endurance; Impaired balance;Decreased mobility; Decreased cognition; Impaired judgement;Decreased safety awareness   Assessment Pt continuing to show progression with amb assistance levels and bed mobility  Pt able to perform all hygine care with supervision as well as all bed mobility  Pt able to amb supervision with rolling walker 75'x2, further distance limited to pt fatigue  Pt performed all exercises with supervisoin  Pt able to seat self back in bed with supervisoin as well  Pt has all belongings in reach and all needs met  Pt will benefit from continued inpatient therapy to increase amb distance and strength  Goals   Patient Goals to go home   STG Expiration Date 03/01/21   Short Term Goal #1 Pt will be able to: (1) perform bed mobility with mod I to decrease caregiver burden (2) perform sit to stand with mod I to increase level of independence and promote safe toiletting and transfers (3) ambulate at least 200` with mod I and least restrictive AD to increase activity tolerance and allow for safe community mobilization (4) increase standing balance by 1 grade to decrease risk of falls (5) negotiate at least a full flight of stairs with supervision and use of hand rail to allow safe access into home   PT Treatment Day 1   Plan   Treatment/Interventions Functional transfer training;LE strengthening/ROM; Elevations; Therapeutic exercise; Endurance training;Patient/family training;Equipment eval/education;Gait training;Bed mobility;Cognitive reorientation   PT Frequency Other (Comment)  (3-5x/wk)   Recommendation   PT Discharge Recommendation Post-Acute Rehabilitation Services; Other (Comment)  (Vs  HHPT and 25/7 support)   Equipment Recommended Eve Lyman PTA

## 2021-02-22 NOTE — PROGRESS NOTES
Progress Note - Danita Duarte Stump 1940, 78 y o  female MRN: [id number]    Unit/Bed#: W -01 Encounter: [id number]    Primary Care Provider: Chaya Frias DO   Date and time admitted to hospital: 2/19/2021 10:20 AM        * Acute on Chronic low back pain  Assessment & Plan  · No saddle anesthesia, bladder/bowel incontinence or sensory disturbance in lower extremities  · Prescribed prednisone on her last visit to her  PCP  Confirmed by daughter  History of osteoporosis  · X-ray lumbar spine: Chronic compression deformities L1 and L2, unchanged from previous imaging studies  Multilevel degenerative spondylosis  also noted  · Able to ambulate without pain    Plan:  · Continue pain management  · PT/OT:  Post acute rehab recommended  · Case management consulted for possible placement a [facility]  Appointment w daughter tomorrow  · Fall precautions in place    Irritable bowel syndrome with diarrhea  Assessment & Plan   Continue home medications: Bentyl    Anemia of chronic disease  Assessment & Plan  · At baseline  · Hemodynamically stable    History of alcohol abuse  Assessment & Plan  Last alcohol consumption more than a month ago  No need for CIWA protocol    Depression without suicidal ideation  Assessment & Plan  · Recently discharged on February 1st   · No suicidal ideation  at this moment  · Continue home medications  Multiple falls  Assessment & Plan  · Noted in history  · Last admission in December  Plan:   · Fall precautions    COVID-19 virus infection  Assessment & Plan  · Admitted in December 2020  · No current symptoms  Plan:  · Continue vitamins  · Continue Eliquis 2 5      Hypoglycemia-resolved as of 2/22/2021  Assessment & Plan  Patient had a hypoglycemic event which resolved after food intake           VTE Pharmacologic Prophylaxis:   Pharmacologic: Enoxaparin (Lovenox)  Mechanical VTE Prophylaxis in Place: Yes    Discussions with Specialists or Other Care Team Provider: IM attending, Case management    Education and Discussions with Family / Patient: Discussed with patient at bedside  Family member updated  Current Length of Stay: 2 day(s)    Current Patient Status: Inpatient     Discharge Plan / Estimated Discharge Date: Tomorrow  Awaiting placement  Code Status: Level 1 - Full Code      Subjective:   Patient was alert and awake, lying in bed  Endorses a mild lower back discomfort  No further complaints  Objective:     Vitals:   Temp (24hrs), Av 8 °F (36 6 °C), Min:97 3 °F (36 3 °C), Max:98 4 °F (36 9 °C)    Temp:  [97 3 °F (36 3 °C)-98 4 °F (36 9 °C)] 97 8 °F (36 6 °C)  HR:  [74-84] 74  Resp:  [13-16] 13  BP: (117-118)/(68-70) 118/70  SpO2:  [96 %-98 %] 98 %  Body mass index is 19 69 kg/m²  Input and Output Summary (last 24 hours): Intake/Output Summary (Last 24 hours) at 2021 0916  Last data filed at 2021 1816  Gross per 24 hour   Intake 240 ml   Output --   Net 240 ml       Physical Exam:     Physical Exam  Constitutional:       General: She is awake  She is not in acute distress  HENT:      Head: Normocephalic and atraumatic  Nose: Nose normal       Mouth/Throat:      Mouth: Mucous membranes are moist    Eyes:      Extraocular Movements: Extraocular movements intact  Cardiovascular:      Rate and Rhythm: Normal rate and regular rhythm  Pulses: Normal pulses  Heart sounds: Normal heart sounds  No murmur  No friction rub  Pulmonary:      Effort: Pulmonary effort is normal  No respiratory distress  Breath sounds: Normal breath sounds  No stridor  No wheezing, rhonchi or rales  Chest:      Chest wall: No tenderness  Abdominal:      General: Bowel sounds are normal  There is no distension  Palpations: Abdomen is soft  Tenderness: There is no abdominal tenderness  There is no right CVA tenderness, left CVA tenderness, guarding or rebound  Musculoskeletal: Normal range of motion           General: No swelling, tenderness or deformity  Right lower leg: No edema  Left lower leg: No edema  Skin:     General: Skin is warm  Coloration: Skin is not jaundiced  Findings: No erythema, lesion or rash  Neurological:      General: No focal deficit present  Mental Status: She is alert and oriented to person, place, and time  Motor: Weakness (Bilaterally in lower extremities) present  Psychiatric:         Mood and Affect: Mood normal          Behavior: Behavior normal  Behavior is cooperative  Cognition and Memory: Cognition is impaired  Memory is impaired  Additional Data:     Labs:    Results from last 7 days   Lab Units 02/22/21  0627  02/19/21  1135   WBC Thousand/uL 4 81   < > 9 77   HEMOGLOBIN g/dL 10 8*   < > 10 3*   HEMATOCRIT % 34 3*   < > 33 4*   PLATELETS Thousands/uL 269   < > 321   NEUTROS PCT %  --   --  66   LYMPHS PCT %  --   --  16   MONOS PCT %  --   --  14*   EOS PCT %  --   --  3    < > = values in this interval not displayed  Results from last 7 days   Lab Units 02/22/21  0627  02/20/21  0551   POTASSIUM mmol/L 4 3   < > 4 6   CHLORIDE mmol/L 105   < > 104   CO2 mmol/L 29   < > 22   BUN mg/dL 22   < > 33*   CREATININE mg/dL 1 02   < > 1 00   CALCIUM mg/dL 8 2*   < > 8 3   ALK PHOS U/L  --   --  88   ALT U/L  --   --  22   AST U/L  --   --  21    < > = values in this interval not displayed  * I Have Reviewed All Lab Data Listed Above  * Additional Pertinent Lab Tests Reviewed:  [facility] 64 Admission Reviewed    Imaging:    Imaging Reports Reviewed Today Include: None  Imaging Personally Reviewed by Myself Includes:  None    Recent Cultures (last 7 days):           Last 24 Hours Medication List:   Current Facility-Administered Medications   Medication Dose Route Frequency Provider Last Rate    acetaminophen  975 mg Oral [facility] Isis Live MD      cholecalciferol  2,000 Units Oral Daily Isis Live MD      Diclofenac Sodium  2 g Topical 4x Daily Isis Live MD      dicyclomine  20 mg Oral 4x Daily [facility] Isis Live MD      docusate sodium  100 mg Oral BID Isis Live MD      donepezil  10 mg Oral HS Isis Live MD      enoxaparin  40 mg Subcutaneous Q24H Karissa Peacock MD      folic acid  1 mg Oral Daily Isis Live MD      HYDROmorphone  0 2 mg Intravenous Q4H PRN Isis Live MD      loperamide  2 mg Oral TID PRN Merline Pancake, MD      melatonin  6 mg Oral HS Neto Sanders PA-C      mirtazapine  45 mg Oral HS Isis Live MD      multivitamin-minerals  1 tablet Oral Daily Isis Live MD      naloxone  0 04 mg Intravenous Q1MIN PRN Isis Live MD      ondansetron  4 mg Intravenous Q4H PRN Isis Live MD      oxyCODONE  2 5 mg Oral Q4H PRN Isis Live MD      oxyCODONE  5 mg Oral Q4H PRN Isis Live MD      pantoprazole  20 mg Oral Early Morning Isis Live MD      thiamine  100 mg Oral Daily Isis Live MD          Today, Patient Was Seen By: Isis Live MD    ** Please Note: This note has been constructed using a voice recognition system   **

## 2021-02-22 NOTE — CASE MANAGEMENT
CM sent updated referral to [address] [place] [address] plan of care  Per [facility], they are familiar with patient  [facility] has an appointment scheduled for 2/23/2021 with patient's daughter [facility]ias [facility] at 0800  [facility] after [facility]'s appointment  CM updated SLIM  CM spoke with patient's daughter Matias Pruett on the phone, [phone]  CM introduced self and role  Patient's daughter aware CM sent referral to [facility] confirmed appointment with daughter scheduled for tomorrow at facility at 0800  Daughter confirmed on the phone  CM offered to f/u with [facility] and daughter re:discharge plan of care  Patient's daughter provided contact number for CM for any f/u questions/concerns  CM spoke with patient at the bedside  CM introduced self and role  Patient updated CM spoke with her daughter Matias Pruett on the phone this morning  [facility]  CM offered to update patient tomorrow morning re:discharge plan of care and answer any questions/concerns  No questions noted at this time  Patient comfortable in bed watching game shows

## 2021-02-22 NOTE — ASSESSMENT & PLAN NOTE
· No saddle anesthesia, bladder/bowel incontinence or sensory disturbance in lower extremities  · Prescribed prednisone on her last visit to her  PCP  Confirmed by daughter  History of osteoporosis  · X-ray lumbar spine: Chronic compression deformities L1 and L2, unchanged from previous imaging studies  Multilevel degenerative spondylosis  also noted  · Able to ambulate without pain    Plan:  · Continue pain management  · PT/OT:  Post acute rehab recommended  · Case management consulted for possible placement a [facility]  Appointment w daughter tomorrow    · Fall precautions in place

## 2021-02-23 VITALS
SYSTOLIC BLOOD PRESSURE: 122 MMHG | DIASTOLIC BLOOD PRESSURE: 72 MMHG | TEMPERATURE: 98.2 F | BODY MASS INDEX: 19.61 KG/M2 | RESPIRATION RATE: 14 BRPM | OXYGEN SATURATION: 95 % | HEART RATE: 74 BPM | HEIGHT: 66 IN | WEIGHT: 122 LBS

## 2021-02-23 PROCEDURE — 0241U HB NFCT DS VIR RESP RNA 4 TRGT: CPT | Performed by: INTERNAL MEDICINE

## 2021-02-23 PROCEDURE — 97110 THERAPEUTIC EXERCISES: CPT

## 2021-02-23 PROCEDURE — 99239 HOSP IP/OBS DSCHRG MGMT >30: CPT | Performed by: INTERNAL MEDICINE

## 2021-02-23 PROCEDURE — 97116 GAIT TRAINING THERAPY: CPT

## 2021-02-23 RX ORDER — ACETAMINOPHEN 325 MG/1
975 TABLET ORAL EVERY 8 HOURS SCHEDULED
Refills: 0
Start: 2021-02-23

## 2021-02-23 RX ORDER — LANOLIN ALCOHOL/MO/W.PET/CERES
6 CREAM (GRAM) TOPICAL
Refills: 0
Start: 2021-02-23

## 2021-02-23 RX ADMIN — THIAMINE HCL TAB 100 MG 100 MG: 100 TAB at 08:19

## 2021-02-23 RX ADMIN — FOLIC ACID 1 MG: 1 TABLET ORAL at 08:19

## 2021-02-23 RX ADMIN — DICLOFENAC SODIUM 2 G: 10 GEL TOPICAL at 08:20

## 2021-02-23 RX ADMIN — ENOXAPARIN SODIUM 40 MG: 40 INJECTION SUBCUTANEOUS at 08:19

## 2021-02-23 RX ADMIN — DICLOFENAC SODIUM 2 G: 10 GEL TOPICAL at 11:36

## 2021-02-23 RX ADMIN — Medication 1 TABLET: at 08:19

## 2021-02-23 RX ADMIN — PANTOPRAZOLE SODIUM 20 MG: 20 TABLET, DELAYED RELEASE ORAL at 06:33

## 2021-02-23 RX ADMIN — DICYCLOMINE HYDROCHLORIDE 20 MG: 20 TABLET ORAL at 06:33

## 2021-02-23 RX ADMIN — ACETAMINOPHEN 975 MG: 325 TABLET, FILM COATED ORAL at 06:33

## 2021-02-23 RX ADMIN — DICYCLOMINE HYDROCHLORIDE 20 MG: 20 TABLET ORAL at 11:36

## 2021-02-23 RX ADMIN — Medication 2000 UNITS: at 08:19

## 2021-02-23 RX ADMIN — DOCUSATE SODIUM 100 MG: 100 CAPSULE, LIQUID FILLED ORAL at 08:20

## 2021-02-23 NOTE — PHYSICAL THERAPY NOTE
PHYSICAL THERAPY NOTE  Patient Name: Liz Max  GLBEN'O Date: 21 0845   PT Last Visit   PT Visit Date 21   Note Type   Note Type Treatment   Pain Assessment   Pain Assessment Tool Pain Assessment not indicated - pt denies pain   Pain Score No Pain   Restrictions/Precautions   Weight Bearing Precautions Per Order No   Other Precautions Cognitive; Chair Alarm; Bed Alarm; Fall Risk   General   Chart Reviewed Yes   Family/Caregiver Present No   Cognition   Orientation Level Oriented to person  (Identified by name and )   Following Commands Follows one step commands with increased time or repetition   Subjective   Subjective Pt OOB in chair to start and end sesion  Identified by name and    Bed Mobility   Supine to Sit Unable to assess   Additional Comments Pt seated OOB in chair to start and end session   Transfers   Sit to Stand 5  Supervision   Additional items Assist x 1; Armrests; Increased time required   Stand to Sit 5  Supervision   Additional items Assist x 1; Increased time required;Armrests   Toilet transfer 5  Supervision   Additional items Assist x 1; Increased time required;Armrests   Ambulation/Elevation   Gait pattern Improper Weight shift;Decreased foot clearance; Short stride; Excessively slow   Gait Assistance 5  Supervision   Additional items Assist x 1   Assistive Device Rolling walker   Distance 200'x1 30'x2   Balance   Static Sitting Good   Dynamic Sitting Fair +   Static Standing Fair -   Dynamic Standing Fair -   Ambulatory Fair   Activity Tolerance   Activity Tolerance Patient tolerated treatment well   Nurse Made Aware Jerome Husain aware  Exercises   Knee AROM Long Arc Quad Sitting;10 reps;AROM; Bilateral   Ankle Pumps Sitting;10 reps;AROM; Bilateral   Marching Standing;10 reps;AROM; Bilateral   Assessment   Prognosis Fair   Problem List Decreased strength;Decreased endurance; Impaired balance;Decreased mobility; Decreased cognition; Impaired judgement;Decreased safety awareness   Assessment Pt showing progression from previous session  Pt able to amb further distance, 200'x1, 30'x2 with supervision and rolling walker  Pt able to perform all exercises with supervision  Pt will benefit from continued in patient therapy to improve strength and amb  Goals   Patient Goals to get my breakfast this moring   STG Expiration Date 03/01/21   Short Term Goal #1 Pt will be able to: (1) perform bed mobility with mod I to decrease caregiver burden (2) perform sit to stand with mod I to increase level of independence and promote safe toiletting and transfers (3) ambulate at least 200` with mod I and least restrictive AD to increase activity tolerance and allow for safe community mobilization (4) increase standing balance by 1 grade to decrease risk of falls (5) negotiate at least a full flight of stairs with supervision and use of hand rail to allow safe access into home   PT Treatment Day 2   Plan   Treatment/Interventions Functional transfer training;LE strengthening/ROM; Elevations; Therapeutic exercise; Endurance training;Patient/family training;Equipment eval/education;Gait training;Bed mobility;Cognitive reorientation   PT Frequency Other (Comment)  (3-5x/wk)   Recommendation   PT Discharge Recommendation Post-Acute Rehabilitation Services  (vs  HHPT and 24/7 support)   Equipment Recommended Iesha Reece PTA

## 2021-02-23 NOTE — ASSESSMENT & PLAN NOTE
· No saddle anesthesia, bladder/bowel incontinence or sensory disturbance in lower extremities  · Prescribed prednisone on her last visit to her  PCP  Confirmed by daughter  History of osteoporosis  · X-ray lumbar spine: Chronic compression deformities L1 and L2, unchanged from previous imaging studies  Multilevel degenerative spondylosis  also noted  · Able to ambulate without pain    Plan:  · Continue pain management  · PT/OT:  Post acute rehab recommended  · Case management updated that [facility] is able to accept the patient for long term placement

## 2021-02-23 NOTE — DISCHARGE INSTR - AVS FIRST PAGE
Dear Cain Hall,     It was our pleasure to care for you here at [facility], [address]  It is our hope that we were always able to exceed the expected standards for your care during your stay  You were hospitalized due to acute on chronic lower back pain  You were cared for on the [facility] floor by Jordon Corona MD under the service of Pratima Ogden MD with the [address] [place] [address] [facility] who covers for your primary care physician (PCP), Bin Solorzano DO, while you were hospitalized  If you have any questions or concerns related to this hospitalization, you may contact us at [phone]  For follow up as well as any medication refills, we recommend that you follow up with your primary care physician  A registered nurse will reach out to you by phone within a few days after your discharge to answer any additional questions that you may have after going home  However, at this time we provide for you here, the most important instructions / recommendations at discharge:     · Notable Medication Adjustments -   · Acetaminophen (Tylenol) 325 mg tablet  Take 3 tablets by mouth every 8 hours  · Diclofenac sodium (Voltaren) 1%  Apply 2 g topically 4 times a day  · Melatonin 3 mg  Take 2 tablets by mouth daily at bedtime  · Testing Required after Discharge -   · None  · Important follow up information -   · Please follow-up with your primary care provider  · Other Instructions -   · Please call your provider if you experience any of the following symptoms:  Severe uncontrolled pain, persistent shortness of breath, dizziness/lightheadedness  · Please review this entire after visit summary as additional general instructions including medication list, appointments, activity, diet, any pertinent wound care, and other additional recommendations from your care team that may be provided for you        Sincerely,     Jordon Corona MD

## 2021-02-23 NOTE — PLAN OF CARE
Problem: Potential for Falls  Goal: Patient will remain free of falls  Description: INTERVENTIONS:  - Assess patient frequently for physical needs  -  Identify cognitive and physical deficits and behaviors that affect risk of falls    -  [place] fall precautions as indicated by assessment   - Educate patient/family on patient safety including physical limitations  - Instruct patient to call for assistance with activity based on assessment  - Modify environment to reduce risk of injury  - Consider OT/PT consult to assist with strengthening/mobility  Outcome: Adequate for Discharge     Problem: Prexisting or High Potential for Compromised Skin Integrity  Goal: Skin integrity is maintained or improved  Description: INTERVENTIONS:  - Identify patients at risk for skin breakdown  - Assess and monitor skin integrity  - Assess and monitor nutrition and hydration status  - Monitor labs   - Assess for incontinence   - Turn and reposition patient  - Assist with mobility/ambulation  - Relieve pressure over bony prominences  - Avoid friction and shearing  - Provide appropriate hygiene as needed including keeping skin clean and dry  - Evaluate need for skin moisturizer/barrier cream  - Collaborate with interdisciplinary team   - Patient/family teaching  - Consider wound care consult   Outcome: Adequate for Discharge     Problem: PAIN - ADULT  Goal: Verbalizes/displays adequate comfort level or baseline comfort level  Description: Interventions:  - Encourage patient to monitor pain and request assistance  - Assess pain using appropriate pain scale  - Administer analgesics based on type and severity of pain and evaluate response  - Implement non-pharmacological measures as appropriate and evaluate response  - Consider cultural and social influences on pain and pain management  - Notify physician/advanced practitioner if interventions unsuccessful or patient reports new pain  Outcome: Adequate for Discharge     Problem: INFECTION - ADULT  Goal: Absence or prevention of progression during hospitalization  Description: INTERVENTIONS:  - Assess and monitor for signs and symptoms of infection  - Monitor lab/diagnostic results  - Monitor all insertion sites, i e  indwelling lines, tubes, and drains  - Monitor endotracheal if appropriate and nasal secretions for changes in amount and color  - [place] appropriate cooling/warming therapies per order  - Administer medications as ordered  - Instruct and encourage patient and family to use good hand hygiene technique  - Identify and instruct in appropriate isolation precautions for identified infection/condition  Outcome: Adequate for Discharge  Goal: Absence of fever/infection during neutropenic period  Description: INTERVENTIONS:  - Monitor WBC    Outcome: Adequate for Discharge     Problem: SAFETY ADULT  Goal: Patient will remain free of falls  Description: INTERVENTIONS:  - Assess patient frequently for physical needs  -  Identify cognitive and physical deficits and behaviors that affect risk of falls    -  [place] fall precautions as indicated by assessment   - Educate patient/family on patient safety including physical limitations  - Instruct patient to call for assistance with activity based on assessment  - Modify environment to reduce risk of injury  - Consider OT/PT consult to assist with strengthening/mobility  Outcome: Adequate for Discharge  Goal: Maintain or return to baseline ADL function  Description: INTERVENTIONS:  -  Assess patient's ability to carry out ADLs; assess patient's baseline for ADL function and identify physical deficits which impact ability to perform ADLs (bathing, care of mouth/teeth, toileting, grooming, dressing, etc )  - Assess/evaluate cause of self-care deficits   - Assess range of motion  - Assess patient's mobility; develop plan if impaired  - Assess patient's need for assistive devices and provide as appropriate  - Encourage maximum independence but intervene and supervise when necessary  - Involve family in performance of ADLs  - Assess for home care needs following discharge   - Consider OT consult to assist with ADL evaluation and planning for discharge  - Provide patient education as appropriate  Outcome: Adequate for Discharge  Goal: Maintain or return mobility status to optimal level  Description: INTERVENTIONS:  - Assess patient's baseline mobility status (ambulation, transfers, stairs, etc )    - Identify cognitive and physical deficits and behaviors that affect mobility  - Identify mobility aids required to assist with transfers and/or ambulation (gait belt, sit-to-stand, lift, walker, cane, etc )  - [place] fall precautions as indicated by assessment  - Record patient progress and toleration of activity level on Mobility SBAR; progress patient to next Phase/Stage  - Instruct patient to call for assistance with activity based on assessment  - Consider rehabilitation consult to assist with strengthening/weightbearing, etc   Outcome: Adequate for Discharge     Problem: DISCHARGE PLANNING  Goal: Discharge to home or other facility with appropriate resources  Description: INTERVENTIONS:  - Identify barriers to discharge w/patient and caregiver  - Arrange for needed discharge resources and transportation as appropriate  - Identify discharge learning needs (meds, wound care, etc )  - Arrange for interpretive services to assist at discharge as needed  - Refer to Case Management Department for coordinating discharge planning if the patient needs post-hospital services based on physician/advanced practitioner order or complex needs related to functional status, cognitive ability, or social support system  Outcome: Adequate for Discharge     Problem: Knowledge Deficit  Goal: Patient/family/caregiver demonstrates understanding of disease process, treatment plan, medications, and discharge instructions  Description: Complete learning assessment and assess knowledge base    Interventions:  - Provide teaching at level of understanding  - Provide teaching via preferred learning methods  Outcome: Adequate for Discharge

## 2021-02-23 NOTE — CASE MANAGEMENT
CM updated by ALLAN  Patient's transport time is 1300 with [facility]  CM updated [facility], nursing and receiving facility  CM received call from patient's daughter Rodriguez Zeng  Debby aware [facility] is able to accept today for placement  Debby updated transport set up for 1300 by suma Medina provided nursing contact number at [facility]  Patient's daughter updated Covid swab pending  Daughter aware Covid swab from 1/31/2021 was negative  CM answered all questions/concerns at this time

## 2021-02-23 NOTE — DISCHARGE SUMMARY
Discharge- Spero Setting Stump 1940, 78 y o  female MRN: [id number]    Unit/Bed#: W -01 Encounter: [id number]    Primary Care Provider: Brandee Terrell DO   Date and time admitted to hospital: 2/19/2021 10:20 AM        * Acute on Chronic low back pain  Assessment & Plan  · No saddle anesthesia, bladder/bowel incontinence or sensory disturbance in lower extremities  · Prescribed prednisone on her last visit to her  PCP  Confirmed by daughter  History of osteoporosis  · X-ray lumbar spine: Chronic compression deformities L1 and L2, unchanged from previous imaging studies  Multilevel degenerative spondylosis  also noted  · Able to ambulate without pain    Plan:  · Continue pain management  · PT/OT:  Post acute rehab recommended  · Case management updated that [facility] able to accept the patient for long term placement  Irritable bowel syndrome with diarrhea  Assessment & Plan   Continue home medications: Bentyl    Anemia of chronic disease  Assessment & Plan  · At baseline  · Hemodynamically stable    History of alcohol abuse  Assessment & Plan  Last alcohol consumption more than a month ago  No need for CIWA protocol    Depression without suicidal ideation  Assessment & Plan  · Recently discharged on February 1st   · No suicidal ideation  at this moment  · Continue home medications  Multiple falls  Assessment & Plan  · Noted in history  · Last admission in December  Plan:   · Fall precautions    COVID-19 virus infection  Assessment & Plan  · Admitted in December 2020  · No current symptoms  Plan:  · Continue vitamins  · Continue Eliquis 2 5      Hypoglycemia-resolved as of 2/22/2021  Assessment & Plan  Patient had a hypoglycemic event which resolved after food intake       Discharging Resident Physician: Rebecca Crane MD  Attending: Jim Wright MD  PCP: Brandee Terrell DO  Admission Date: 2/19/2021  Discharge Date: 02/23/21    Disposition:     [address] at [facility]    Reason for Admission:  Acute on chronic low back pain    Consultations During Hospital Stay:  · Case Management    Procedures Performed:     · None    Significant Findings / Test Results:     · X-ray lumbar spine:  Chronic compression deformities L1-L2, unchanged from previous imaging studies  Multilevel degenerative spondylosis also noted  Incidental Findings:   · None     Test Results Pending at Discharge (will require follow up): · None     Outpatient Tests Requested:  · None    Complications:  None    Hospital Course:     Shannon Riggs is a 78 y o  female patient who originally presented to the hospital on 2/19/2021 due to acute on chronic low back pain  X-ray of the lumbar spine showed chronic compression deformities in L1 and L2; these are unchanged from previous imaging studies  Multilevel degenerative spondylosis was also noted  Patient received pain management and PT/OT was consulted: recommended post acute rehab  Case management updated today that [facility] is able to accept the patient for long-term placement  Condition at Discharge: stable     Discharge Day Visit / Exam:     Subjective:  Patient was alert and awake, sitting in her recliner  Patient was having breakfast   Complains of lower back pain which seems to be at her baseline  Vitals: Blood Pressure: 122/72 (02/23/21 0718)  Pulse: 74 (02/23/21 0718)  Temperature: 98 2 °F (36 8 °C) (02/23/21 0718)  Temp Source: Oral (02/22/21 2223)  Respirations: 14 (02/23/21 0718)  Height: 5' 6" (167 6 cm) (02/19/21 1816)  Weight - Scale: 55 3 kg (122 lb) (02/19/21 1816)  SpO2: 95 % (02/23/21 0718)  Exam:   Physical Exam  Constitutional:       General: She is awake  She is not in acute distress  HENT:      Head: Normocephalic and atraumatic  Nose: Nose normal       Mouth/Throat:      Mouth: Mucous membranes are moist    Eyes:      Extraocular Movements: Extraocular movements intact     Cardiovascular:      Rate and Rhythm: Normal rate and regular rhythm  Pulses: Normal pulses  Heart sounds: Normal heart sounds  No murmur  No friction rub  Pulmonary:      Effort: Pulmonary effort is normal  No respiratory distress  Breath sounds: Normal breath sounds  No stridor  No wheezing, rhonchi or rales  Chest:      Chest wall: No tenderness  Abdominal:      General: Bowel sounds are normal  There is no distension  Palpations: Abdomen is soft  Tenderness: There is no abdominal tenderness  There is no right CVA tenderness, left CVA tenderness, guarding or rebound  Musculoskeletal: Normal range of motion  General: No swelling, tenderness or deformity  Right lower leg: No edema  Left lower leg: No edema  Skin:     General: Skin is warm  Coloration: Skin is not jaundiced  Findings: No erythema, lesion or rash  Neurological:      General: No focal deficit present  Mental Status: She is alert and oriented to person, place, and time  Motor: Weakness present  Psychiatric:         Mood and Affect: Mood normal          Behavior: Behavior normal  Behavior is cooperative  Cognition and Memory: Cognition is impaired  Memory is impaired  Discussion with Family:  Discussed with patient  Family was updated throughout the patient's hospital stay  Discharge instructions/Information to patient and family:   See after visit summary for information provided to patient and family  Provisions for Follow-Up Care:  See after visit summary for information related to follow-up care and any pertinent home health orders  Planned Readmission:  None     Discharge Medications:  See after visit summary for reconciled discharge medications provided to patient and family        ** Please Note: This note has been constructed using a voice recognition system **

## 2021-02-23 NOTE — NURSING NOTE
D/C'd to [facility]  Report called to receiving nurse  Pt left in stable condition with personal belongings and paperwork

## 2021-02-23 NOTE — CASE MANAGEMENT
CM updated [facility] is able to accept for long term placement  Patient will discharge to [facility]  Report #  Fax # [phone]  CM updated [facility] and nursing with plan of care  NOLAN left voice message with patient's daughter French Para, [phone]  Waiting for callback  CM sent referral to [facility]  Waiting for transport time

## 2021-02-24 DIAGNOSIS — U07.1 COVID-19 VIRUS INFECTION: ICD-10-CM

## 2021-02-24 DIAGNOSIS — F03.90 MAJOR NEUROCOGNITIVE DISORDER (HCC): ICD-10-CM

## 2021-02-24 DIAGNOSIS — F33.2 SEVERE RECURRENT MAJOR DEPRESSION WITHOUT PSYCHOTIC FEATURES (HCC): ICD-10-CM

## 2021-02-24 DIAGNOSIS — K58.0 IRRITABLE BOWEL SYNDROME WITH DIARRHEA: ICD-10-CM

## 2021-02-25 RX ORDER — DICYCLOMINE HCL 20 MG
TABLET ORAL
Qty: 120 TABLET | Refills: 0 | OUTPATIENT
Start: 2021-02-25

## 2021-02-25 RX ORDER — FOLIC ACID 1 MG/1
TABLET ORAL
Qty: 30 TABLET | Refills: 0 | OUTPATIENT
Start: 2021-02-25

## 2021-02-25 RX ORDER — LANOLIN ALCOHOL/MO/W.PET/CERES
CREAM (GRAM) TOPICAL
Qty: 30 TABLET | Refills: 0 | OUTPATIENT
Start: 2021-02-25

## 2021-02-25 RX ORDER — MIRTAZAPINE 45 MG/1
TABLET, FILM COATED ORAL
Qty: 30 TABLET | Refills: 0 | OUTPATIENT
Start: 2021-02-25

## 2021-02-25 RX ORDER — DONEPEZIL HYDROCHLORIDE 10 MG/1
TABLET, FILM COATED ORAL
Qty: 30 TABLET | Refills: 0 | OUTPATIENT
Start: 2021-02-25

## 2021-03-03 ENCOUNTER — NURSING HOME VISIT (OUTPATIENT)
Dept: GERIATRICS | Facility: OTHER | Age: 81
End: 2021-03-03
Payer: MEDICARE

## 2021-03-03 DIAGNOSIS — U07.1 COVID-19 VIRUS INFECTION: ICD-10-CM

## 2021-03-03 DIAGNOSIS — F10.10 ALCOHOL ABUSE: ICD-10-CM

## 2021-03-03 DIAGNOSIS — F03.90 MAJOR NEUROCOGNITIVE DISORDER (HCC): ICD-10-CM

## 2021-03-03 DIAGNOSIS — F10.20 ALCOHOL USE DISORDER, MODERATE, IN CONTROLLED ENVIRONMENT (HCC): ICD-10-CM

## 2021-03-03 DIAGNOSIS — F33.2 SEVERE RECURRENT MAJOR DEPRESSION WITHOUT PSYCHOTIC FEATURES (HCC): Primary | ICD-10-CM

## 2021-03-03 PROCEDURE — 99310 SBSQ NF CARE HIGH MDM 45: CPT | Performed by: NURSE PRACTITIONER

## 2021-03-08 NOTE — PROGRESS NOTES
[address] [place] [address]  POS: 32: NF- Long Term, [place]     PSYCHIATRIC EVALUATION     NAME: Hortencia Jerome  AGE: 78 y o  SEX: female [id number]    DATE OF ENCOUNTER: 3/3/2021    Code status:  Full code    Assessment and Plan      Diagnosis ICD-10-CM Associated Orders   1  Depression without suicidal ideation  F33 2    2  Major neurocognitive disorder ([address] )  F01 50    3  Alcohol abuse  F10 10    4  COVID-19 virus infection  U07 1    5  History of alcohol abuse  F10 20        All medications and routine orders were reviewed and updated as needed  Evaluation of Psychotropic Drugs for possible gradual dose reductions    Psychotropic medications have been reviewed  Patient continues with symptoms of depression as noted below  Any dose reductions at this time would be clinically contraindicated, as it would be likely to cause worsening of symptoms  Plan discussed with: Patient    Treatment Recommendations/Precautions:    Continue medications the same  Medication management every 1 months    Medications Risks/Benefits:      Risks, Benefits And Possible Side Effects Of Medications:    Risks, benefits, and possible side effects of medications explained to Sharmila Momin and she verbalizes understanding and agreement for treatment  Controlled Medication Discussion:     Not applicable - controlled prescriptions are not prescribed by this practice    Chief Complaint     Seen for Psychiatric Consult  at Nursing Facility/Assisted Living    History of Present Illness     Sharmila Momin is an 79 yo female with history of depression, alcohol abuse, dementia  She was admitted 2/23/21 after hospitalization for back pain  Psychiatry is consulted to establish care and follow up for depression  She had a prolonged hospitalization December to February, including a stay on the behavioral health unit    Originally admitted due to COVID symptoms and alcohol withdrawal, had made suicidal ideation to walk into traffic due to her  being ill with COVID  While she was hospitalized in December, her  did pass away  She was treated for her depression on the [facility] at [facility] and diagnosed with cognitive impairment  It was determined she could not live alone and was discharged in early February to stay with her daughter with goal of a long term placement  She is seen today in her room  Presents today flat, but reports she feels her depression is in fairly good control on current medications  She is currently on Remeron 45 mg, Melatonin 3 mg, Aricept 10 mg  She denies any suicidal thoughts  She states she is somewhat uncomfortable on this particular unit at [facility], as her cognitive impairments at this time are mild  She does have limited insight, but oriented x 3  Will continue medications the same for now  Depression  This is a chronic problem  The current episode started more than 1 month ago  The problem has been gradually improving  The treatment provided mild relief         She  reports fluctuating sleep pattern, fluctuating appetite, fluctuating energy levels    The following portions of the patient's history were reviewed and updated as appropriate: allergies, current medications, past family history, past medical history, past social history, past surgical history and problem list     Past Psychiatric History:     Past Inpatient Psychiatric Treatment:   Past inpatient psychiatric admissions at [place] [id number]  Past Outpatient Psychiatric Treatment:    Was in outpatient psychiatric treatment in the past with a psychiatrist  Past Suicide Attempts: recent suicidal ideation  Past Violent Behavior: no  Past Psychiatric Medication Trials: patient does not remember    Traumatic History:     Abuse: no history of physical or sexual abuse  Other Traumatic Events: none    HISTORY:  Past Medical History:   Diagnosis Date    Dementia ([address] )     per family    Hypertension     IBS (irritable bowel syndrome)      No family history on file  Social History     Socioeconomic History    Marital status: /Civil Union     Spouse name: Not on file    Number of children: Not on file    Years of education: Not on file    Highest education level: Not on file   Occupational History    Not on file   Social Needs    Financial resource strain: Not on file    Food insecurity     Worry: Not on file     Inability: Not on file   [employer] needs     Medical: Not on file     Non-medical: Not on file   Tobacco Use    Smoking status: Former Smoker    Smokeless tobacco: Never Used   Substance and Sexual Activity    Alcohol use: Yes     Alcohol/week: 21 0 standard drinks     Types: 21 Cans of beer per week     Frequency: Monthly or less     Drinks per session: 1 or 2     Comment: states 3 beers/night    Drug use: No    Sexual activity: Not on file   Lifestyle    Physical activity     Days per week: Not on file     Minutes per session: Not on file    Stress: Not on file   Relationships    Social connections     Talks on phone: Not on file     Gets together: Not on file     Attends Worship service: Not on file     Active member of club or organization: Not on file     Attends meetings of clubs or organizations: Not on file     Relationship status: Not on file    Intimate partner violence     Fear of current or ex partner: Not on file     Emotionally abused: Not on file     Physically abused: Not on file     Forced sexual activity: Not on file   Other Topics Concern    Not on file   Social History Narrative    Not on file       Allergies: Allergies   Allergen Reactions    Penicillins      Does not know reaction       Review of Systems     Review of Systems   Psychiatric/Behavioral: Positive for behavioral problems, confusion, decreased concentration, depression and dysphoric mood  The patient is nervous/anxious      All other systems reviewed and are negative        Medications and orders       Current Outpatient Medications:     acetaminophen (TYLENOL) 325 mg tablet, Take 3 tablets (975 mg total) by mouth every 8 (eight) hours, Disp: , Rfl: 0    cholecalciferol (VITAMIN D3) 1,000 units tablet, Take 2 tablets (2,000 Units total) by mouth daily, Disp: 60 tablet, Rfl: 0    Diclofenac Sodium (VOLTAREN) 1 %, Apply 2 g topically 4 (four) times a day, Disp: , Rfl: 0    dicyclomine (BENTYL) 20 mg tablet, Take 1 tablet (20 mg total) by mouth 4 (four) times a day (before meals and at bedtime) (Patient not taking: Reported on 2/19/2021), Disp: 120 tablet, Rfl: 0    donepezil (ARICEPT) 10 mg tablet, Take 1 tablet (10 mg total) by mouth daily at bedtime, Disp: 30 tablet, Rfl: 0    folic acid (FOLVITE) 1 mg tablet, Take 1 tablet (1 mg total) by mouth daily, Disp: 30 tablet, Rfl: 0    loperamide (IMODIUM) 2 mg capsule, Take 1 capsule (2 mg total) by mouth 3 (three) times a day as needed for diarrhea (Patient not taking: Reported on 2/19/2021), Disp: 30 capsule, Rfl: 0    melatonin 3 mg, Take 2 tablets (6 mg total) by mouth daily at bedtime, Disp: , Rfl: 0    mirtazapine (REMERON) 45 MG tablet, Take 1 tablet (45 mg total) by mouth daily at bedtime, Disp: 30 tablet, Rfl: 0    Multiple Vitamin (MULTIVITAMIN) tablet, Take 1 tablet by mouth daily, Disp: , Rfl:     omeprazole (PriLOSEC) 10 mg delayed release capsule, Take 10 mg by mouth daily, Disp: , Rfl:     thiamine 100 MG tablet, Take 1 tablet (100 mg total) by mouth daily, Disp: 30 tablet, Rfl: 0      Objective     Mental Status Evaluation:    Appearance age appropriate, casually dressed   Behavior cooperative, calm   Speech increased latency of response   Mood depressed   Affect flat   Thought Processes decreased rate of thoughts   Associations intact associations   Thought Content no overt delusions   Perceptual Disturbances: no auditory hallucinations, no visual hallucinations   Abnormal Thoughts  Risk Potential Suicidal ideation - None  Homicidal ideation - None  Potential for aggression - No   Orientation oriented to person, place and time/date   Memory recent memory mildly impaired   Consciousness alert and awake   Attention Span Concentration Span attention span and concentration are age appropriate   Intellect appears to be of average intelligence   Insight limited   Judgement limited   Muscle Strength and  Gait normal muscle strength and normal muscle tone, normal gait and normal balance   Motor Activity no abnormal movements   Language no difficulty naming common objects, no difficulty repeating a phrase, no difficulty writing a sentence   Fund of Knowledge adequate knowledge of current events  adequate fund of knowledge regarding past history  adequate fund of knowledge regarding vocabulary    Pain none   Pain Scale 0       Physical Exam  Vitals signs and nursing note reviewed  Psychiatric:         Mood and Affect: Mood is anxious and depressed  Affect is flat  Speech: She is noncommunicative  Behavior: Behavior is agitated  Thought Content: Thought content is paranoid  Cognition and Memory: Cognition is impaired  Judgment: Judgment is impulsive and inappropriate  Pertinent Laboratory/Diagnostic Studies:   I have personally reviewed pertinent lab results  Counseling / Coordination of Care  Total floor / unit time spent today 25 minutes  Greater than 50% of total time was spent with the patient and / or family counseling and / or coordination of care       MARC [place] 3/3/2021

## 2022-05-14 NOTE — ED ATTENDING ATTESTATION
2/19/2021  IAnnette DO, saw and evaluated the patient  I have discussed the patient with the resident/non-physician practitioner and agree with the resident's/non-physician practitioner's findings, Plan of Care, and MDM as documented in the resident's/non-physician practitioner's note, except where noted  All available labs and Radiology studies were reviewed  I was present for key portions of any procedure(s) performed by the resident/non-physician practitioner and I was immediately available to provide assistance  At this point I agree with the current assessment done in the Emergency Department  I have conducted an independent evaluation of this patient a history and physical is as follows:      49-year-old female, lower back pain, says she is unable to ambulate at home, has been lying in bed, has tried pain medication which has not helped, unable to get back and forth to the bathroom says is absolutely no way that she discharge  On exam, mild spinous process tenderness, nontoxic in appearance, x-ray performed which showed degenerative changes, patient unable to ambulate after 2 doses of pain medication  , will that for ambulatory dysfunction  Although I do think that there is a large component depression affiliated here as well as patient states she lost her  less than a month ago            ED Course         Critical Care Time  Procedures
14-May-2022 16:41

## 2022-07-09 NOTE — DISCHARGE INSTR - OTHER ORDERS
Patient is being discharged, no SI/HI, no psychosis or A/V  Patient is in agreement with discharge  No questions verbalized  Patient provided with after care appointment, discharge instructions and prescriptions  IMM, quality core measures, transition of care, discharge instructions, and treatment plan complete  Patient will be transported by   Javi Snowden will continue to follow up as needed  Patient's discharge was faxed to outpatient providers  What you need to know aboutcoronavirus disease 2019 (COVID-19)     What is coronavirus disease 2019 (COVID-19)? Coronavirus disease 2019 (COVID-19) is a respiratory illness that can spread from person to person  The virus that causes COVID-19 is a novel coronavirus that was first identified during an investigation into an outbreak in [place]  [place] in [place]  get [place]? Yes  COVID-19 is spreading from person to person in parts of the [place]  Risk of infection with COVID-19 is higher for people who are close contacts of someone known to have COVID-19, for example healthcare workers, or household members  Other people at higher risk for infection are those who live in or have recently been in an area with ongoing spread of COVID-19  Learn more about places with ongoing spread at   [facility]  html#geographic  Have there been cases of COVID[place] in [place] ?   Yes  The first case of COVID-19 in the [place] was reported on January 21, 2020  The current count of cases of COVID-19 in the [place] is available on [employer] at [facility]  How does COVID-19 spread? The virus that causes COVID-19 probably emerged from an animal source, but is now spreading from person to person   The virus is thought to spread mainly between people who are in close contact with one another (within about 6 feet) through respiratory droplets produced when an infected person coughs or sneezes  It also may be possible that a person can get COVID-19 by touching a surface or object that has the virus on it and then touching their own mouth, nose, or possibly their eyes, but this is not thought to be the main way the virus spreads  Learn what is known about the spread of newly emerged coronaviruses at [facility]  What are the symptoms of COVID-19? Patients with COVID-19 have had mild to severe respiratory illness with symptoms of   fever   cough   shortness of breath  What are severe complications from this virus? Some patients have pneumonia in both lungs, multi-organ failure and in some cases death  How can I help protect myself? People can help protect themselves from respiratory illness with everyday preventive actions  Avoid close contact with people who are sick  Avoid touching your eyes, nose, and mouth withunwashed hands  Wash your hands often with soap and water for at least 20 seconds  Use an alcohol-based hand  that contains at least 60% alcohol if soap and water are not available  If you are sick, to keep from spreading respiratory illness to others, you should   Stay home when you are sick  Cover your cough or sneeze with a tissue, then throw the tissue in the trash  Clean and disinfect frequently touched objectsand surfaces  What should I do if I recently traveled from an area with ongoing spread of COVID-19? If you have traveled from an affected area, there may be restrictions on your movements for up to 2 weeks  If you develop symptoms during that period (fever, cough, trouble breathing), seek medical advice  Call the office of your health care provider before you go, and tell them about your travel and your symptoms  They will give you instructions on how to get care without exposing other people to your illness   While sick, avoid contact with people, don't go out and delay any travel to reduce the possibility of spreading illness to others  Is there a vaccine? There is currently no vaccine to protect against COVID-19  The best way to prevent infection is to take everyday preventive actions, like avoiding close contact with people who are sick and washing your hands often  Is there a treatment? There is no specific antiviral treatment for COVID-19  People with COVID-19 can seek medical care to helprelieve symptoms  For more information: www cdc gov/ATLHV25PP 840775-Y 03/03/2020       What to do if you are sick withcoronavirus disease 2019 (COVID-19)     If you are sick with COVID-19 or suspect you are infected with the virus that causes COVID-19, follow the steps below to help prevent the disease from spreading to people in your home and community  Stay home except to get medical care   You should restrict activities outside your home, except for getting medical care  Do not go to work, school, or public areas  Avoid using public transportation, ride-sharing, or taxis  Separate yourself from other people and animals inyour home  People: As much as possible, you should stay in a specific room and away from other people in your home  Also, you should use a separate bathroom, if available  Animals: Do not handle pets or other animals while sick  See COVID-19 and Animals for more information  Call ahead before visiting your doctor   If you have a medical appointment, call the healthcare provider and tell them that you have or may have COVID-19  This will help the healthcare provider's office take steps to keep other people from getting infected or exposed  Wear a facemask  You should wear a facemask when you are around other people (e g , sharing a room or vehicle) or pets and before you enter a healthcare provider's office   If you are not able to wear a facemask (for example, because it causes trouble breathing), then people who live with you should not stay in the same room with you, or they should wear a facemask if they enteryour room  Cover your coughs and sneezes   Cover your mouth and nose with a tissue when you cough or sneeze  Throw used tissues in a lined trash can; immediately wash your hands with soap and water for at least 20 seconds or clean your hands with an alcohol-based hand  that contains at least 60 to 95% alcohol, covering all surfaces of your hands and rubbing them together until they feel dry  Soap and water should be used preferentially if hands are visibly dirty  Avoid sharing personal household items   You should not share dishes, drinking glasses, cups, eating utensils, towels, or bedding with other people or pets in your home  After using these items, they should be washed thoroughly with soap and water  Clean your hands often  Wash your hands often with soap and water for at least 20 seconds  If soap and water are not available, clean your hands with an alcohol-based hand  that contains at least 60% alcohol, covering all surfaces of your hands and rubbing them together until they feel dry  Soap and water should be used preferentially if hands are visibly dirty  Avoid touching your eyes, nose, and mouth with unwashed hands  Clean all "high-touch" surfaces every day  High touch surfaces include counters, tabletops, doorknobs, bathroom fixtures, toilets, phones, keyboards, tablets, and bedside tables  Also, clean any surfaces that may have blood, stool, or body fluids on them  Use a household cleaning spray or wipe, according to the label instructions  Labels contain instructions for safe and effective use of the cleaning product including precautions you should take when applying the product, such as wearing gloves and making sure you have good ventilation during use of the product  Monitor your symptoms  Seek prompt medical attention if your illness is worsening (e g , difficulty breathing)   Before seeking care, call your healthcare provider and tell them that you have, or are being evaluated for, COVID-19  Put on a facemask before you enter the facility  These steps will help the healthcare provider's office to keep other people in the office or waiting room from getting infectedor exposed  Ask your healthcare provider to call the local or state health department  Persons who are placed under active monitoring or facilitated self-monitoring should follow instructions provided by their local health department or occupational health professionals, as appropriate  If you have a medical emergency and need to call 911, notify the dispatch personnel that you have, or are being evaluated for COVID-19  If possible, put on a facemask before emergency medical services arrive  Discontinuing home isolation  Patients with confirmed COVID-19 should remain under home isolation precautions until the risk of secondary transmission to others is thought to be low  The decision to discontinue home isolation precautions should be made on a case-by-case basis, in consultation with healthcare providers and [facility] and [facility] health departments  For more information: www cdc gov/RORWS31VH 191744-O 02/24/2020       Stay home when you are sick,except to get medical care  Wash your hands often with soap and water for at least 20 seconds  Cover your cough or sneeze with a tissue, then throw the tissue in the trash  Clean and disinfect frequently touched objects and surfaces  Avoid touching your eyes, nose, and mouth  STOP THE SPREAD OF GERMS  For more information: www cdc gov/COVID19 Avoid close contact with people who are sick  Help prevent the spread of respiratory diseases like COVID-19       [facility] [place]   COVID-19 CRISIS COUNSELING PROGRAM   GET CONNECTED WITH A FREE CRISIS COUNSELOR   CALL [phone]   Do you feel    Stressed? Overwhelmed? Alone? Afraid? Anxiety? During these uncertain times, you are not alone  We are here to listen     Please call our [facility] and Referral Line   [phone] TTY: [phone]   There are trained professionals available 24/7 ready to help you navigate these unprecedented challenges  These services are FREE & CONFIDENTIAL  This publication was made possible by Savanna Fuentes Number [id number], in collaboration with the [address] [place] [address], [address] [place] [address]   [facility] [address] [facility]  [phone]  Financial Help Provided: SNAP, [address], Welfare Office, [facility]  Please visit this office to complete your application for Medical Assistance      Sun Hamilton RN, our [facility] Nurse Navigator, will be calling you after your discharge, on the phone number that you provided  She will be available as an additional support, if needed  If you wish to speak with her, you may contact Josi Grewal at [phone]  No

## 2024-04-15 NOTE — NURSING NOTE
Patient is visible on the unit and able to make her needs known to staff  Denies all symptoms  Medication complaint and cooperative with care  Appetite is good  Yes that is fine

## 2024-06-18 NOTE — NURSING NOTE
Patient visible on the unit   Pleasant upon approach  Forgetful  Denies depression and some anxiety related to her discharge   States '' my kids dont want me '' med and meal compliant  Will continue to monitor  Spoke with Marian at [facility] and informed her of when patient will come out of isolation which is 6/26. They will call next week to see how he is doing and then come on 6/27 to assess him with a planned discharge date of 6/28/24. Will continue to assist with the discharge planning.